# Patient Record
Sex: FEMALE | Race: WHITE | Employment: UNEMPLOYED | URBAN - NONMETROPOLITAN AREA
[De-identification: names, ages, dates, MRNs, and addresses within clinical notes are randomized per-mention and may not be internally consistent; named-entity substitution may affect disease eponyms.]

---

## 2017-10-02 ENCOUNTER — HOSPITAL ENCOUNTER (EMERGENCY)
Age: 32
Discharge: LEFT BEFORE DISCHARGE | DRG: 951 | End: 2017-10-02
Payer: MEDICAID

## 2017-10-02 ENCOUNTER — HOSPITAL ENCOUNTER (OUTPATIENT)
Age: 32
Discharge: STILL ADMITTED OR ADMINISTRATIVE | DRG: 951 | End: 2017-10-03
Attending: OBSTETRICS & GYNECOLOGY | Admitting: OBSTETRICS & GYNECOLOGY
Payer: MEDICAID

## 2017-10-02 VITALS
RESPIRATION RATE: 18 BRPM | HEIGHT: 66 IN | BODY MASS INDEX: 25.71 KG/M2 | HEART RATE: 82 BPM | OXYGEN SATURATION: 100 % | SYSTOLIC BLOOD PRESSURE: 120 MMHG | WEIGHT: 160 LBS | TEMPERATURE: 98 F | DIASTOLIC BLOOD PRESSURE: 74 MMHG

## 2017-10-02 PROCEDURE — 87186 SC STD MICRODIL/AGAR DIL: CPT

## 2017-10-02 PROCEDURE — 87184 SC STD DISK METHOD PER PLATE: CPT

## 2017-10-02 PROCEDURE — 87077 CULTURE AEROBIC IDENTIFY: CPT

## 2017-10-02 PROCEDURE — 80307 DRUG TEST PRSMV CHEM ANLYZR: CPT

## 2017-10-02 PROCEDURE — 81001 URINALYSIS AUTO W/SCOPE: CPT

## 2017-10-02 PROCEDURE — 87086 URINE CULTURE/COLONY COUNT: CPT

## 2017-10-02 ASSESSMENT — PAIN DESCRIPTION - FREQUENCY: FREQUENCY: CONTINUOUS

## 2017-10-02 ASSESSMENT — PAIN DESCRIPTION - PAIN TYPE: TYPE: ACUTE PAIN

## 2017-10-02 ASSESSMENT — PAIN SCALES - GENERAL: PAINLEVEL_OUTOF10: 10

## 2017-10-03 ENCOUNTER — APPOINTMENT (OUTPATIENT)
Dept: ULTRASOUND IMAGING | Age: 32
DRG: 951 | End: 2017-10-03
Payer: MEDICAID

## 2017-10-03 ENCOUNTER — HOSPITAL ENCOUNTER (INPATIENT)
Age: 32
LOS: 2 days | Discharge: LEFT BEFORE DISCHARGE | DRG: 951 | End: 2017-10-05
Attending: INTERNAL MEDICINE | Admitting: INTERNAL MEDICINE
Payer: MEDICAID

## 2017-10-03 ENCOUNTER — HOSPITAL ENCOUNTER (EMERGENCY)
Age: 32
Discharge: LEFT BEFORE DISCHARGE | DRG: 951 | End: 2017-10-03
Payer: MEDICAID

## 2017-10-03 VITALS
RESPIRATION RATE: 16 BRPM | TEMPERATURE: 98.8 F | HEART RATE: 95 BPM | SYSTOLIC BLOOD PRESSURE: 115 MMHG | DIASTOLIC BLOOD PRESSURE: 73 MMHG | OXYGEN SATURATION: 99 %

## 2017-10-03 VITALS
RESPIRATION RATE: 22 BRPM | DIASTOLIC BLOOD PRESSURE: 90 MMHG | OXYGEN SATURATION: 95 % | HEART RATE: 119 BPM | SYSTOLIC BLOOD PRESSURE: 116 MMHG | TEMPERATURE: 98.1 F

## 2017-10-03 DIAGNOSIS — L02.11 CUTANEOUS ABSCESS OF NECK: Primary | ICD-10-CM

## 2017-10-03 DIAGNOSIS — F11.93 NARCOTIC WITHDRAWAL (HCC): ICD-10-CM

## 2017-10-03 DIAGNOSIS — Z34.92 PREGNANT AND NOT YET DELIVERED IN SECOND TRIMESTER: Chronic | ICD-10-CM

## 2017-10-03 PROBLEM — M54.2 NECK PAIN ON LEFT SIDE: Status: ACTIVE | Noted: 2017-10-03

## 2017-10-03 LAB
ALBUMIN SERPL-MCNC: 3.6 G/DL (ref 3.5–5.1)
ALLEN TEST: POSITIVE
ALP BLD-CCNC: 107 U/L (ref 38–126)
ALT SERPL-CCNC: 13 U/L (ref 11–66)
AMPHETAMINE+METHAMPHETAMINE URINE SCREEN: NEGATIVE
ANION GAP SERPL CALCULATED.3IONS-SCNC: 9 MEQ/L (ref 8–16)
ANISOCYTOSIS: ABNORMAL
AST SERPL-CCNC: 15 U/L (ref 5–40)
BACTERIA: ABNORMAL /HPF
BARBITURATE QUANTITATIVE URINE: NEGATIVE
BASE EXCESS (CALCULATED): 1 MMOL/L (ref -2.5–2.5)
BASOPHILS # BLD: 0.3 %
BASOPHILS # BLD: 0.3 %
BASOPHILS ABSOLUTE: 0 THOU/MM3 (ref 0–0.1)
BASOPHILS ABSOLUTE: 0 THOU/MM3 (ref 0–0.1)
BENZODIAZEPINE QUANTITATIVE URINE: NEGATIVE
BILIRUB SERPL-MCNC: 0.5 MG/DL (ref 0.3–1.2)
BILIRUBIN DIRECT: < 0.2 MG/DL (ref 0–0.3)
BILIRUBIN URINE: ABNORMAL
BLOOD, URINE: NEGATIVE
BUN BLDV-MCNC: 6 MG/DL (ref 7–22)
CALCIUM SERPL-MCNC: 8.8 MG/DL (ref 8.5–10.5)
CANNABINOID QUANTITATIVE URINE: POSITIVE
CASTS UA: ABNORMAL /LPF
CHARACTER, URINE: ABNORMAL
CHLORIDE BLD-SCNC: 102 MEQ/L (ref 98–111)
CO2: 26 MEQ/L (ref 23–33)
COCAINE METABOLITE QUANTITATIVE URINE: POSITIVE
COLLECTED BY:: ABNORMAL
COLOR: ABNORMAL
CREAT SERPL-MCNC: 0.3 MG/DL (ref 0.4–1.2)
CRYSTALS, UA: ABNORMAL
DEVICE: ABNORMAL
EOSINOPHIL # BLD: 2.7 %
EOSINOPHIL # BLD: 3.4 %
EOSINOPHILS ABSOLUTE: 0.3 THOU/MM3 (ref 0–0.4)
EOSINOPHILS ABSOLUTE: 0.4 THOU/MM3 (ref 0–0.4)
EPITHELIAL CELLS, UA: ABNORMAL /HPF
ETHYL ALCOHOL, SERUM: < 0.01 %
GFR SERPL CREATININE-BSD FRML MDRD: > 90 ML/MIN/1.73M2
GLUCOSE BLD-MCNC: 97 MG/DL (ref 70–108)
GLUCOSE URINE: NEGATIVE MG/DL
HCO3: 24 MMOL/L (ref 23–28)
HCT VFR BLD CALC: 32.5 % (ref 37–47)
HCT VFR BLD CALC: 33.7 % (ref 37–47)
HEMOGLOBIN: 11.4 GM/DL (ref 12–16)
HEMOGLOBIN: 12.1 GM/DL (ref 12–16)
ICTOTEST: NEGATIVE
KETONES, URINE: NEGATIVE
LACTIC ACID: 1.2 MMOL/L (ref 0.5–2.2)
LEUKOCYTE ESTERASE, URINE: ABNORMAL
LYMPHOCYTES # BLD: 15.7 %
LYMPHOCYTES # BLD: 19.1 %
LYMPHOCYTES ABSOLUTE: 1.8 THOU/MM3 (ref 1–4.8)
LYMPHOCYTES ABSOLUTE: 2.4 THOU/MM3 (ref 1–4.8)
MCH RBC QN AUTO: 30.8 PG (ref 27–31)
MCH RBC QN AUTO: 31.5 PG (ref 27–31)
MCHC RBC AUTO-ENTMCNC: 34.9 GM/DL (ref 33–37)
MCHC RBC AUTO-ENTMCNC: 36 GM/DL (ref 33–37)
MCV RBC AUTO: 87.4 FL (ref 81–99)
MCV RBC AUTO: 88.2 FL (ref 81–99)
MISCELLANEOUS LAB TEST RESULT: ABNORMAL
MONOCYTES # BLD: 3.7 %
MONOCYTES # BLD: 4 %
MONOCYTES ABSOLUTE: 0.4 THOU/MM3 (ref 0.4–1.3)
MONOCYTES ABSOLUTE: 0.5 THOU/MM3 (ref 0.4–1.3)
NITRITE, URINE: NEGATIVE
NUCLEATED RED BLOOD CELLS: 0 /100 WBC
NUCLEATED RED BLOOD CELLS: 0 /100 WBC
O2 SATURATION: 97 %
OPIATES, URINE: POSITIVE
OSMOLALITY CALCULATION: 271.4 MOSMOL/KG (ref 275–300)
OXYCODONE: NEGATIVE
PCO2: 31 MMHG (ref 35–45)
PDW BLD-RTO: 14.3 % (ref 11.5–14.5)
PDW BLD-RTO: 14.6 % (ref 11.5–14.5)
PH BLOOD GAS: 7.49 (ref 7.35–7.45)
PH UA: 6
PHENCYCLIDINE QUANTITATIVE URINE: NEGATIVE
PLATELET # BLD: 181 THOU/MM3 (ref 130–400)
PLATELET # BLD: 194 THOU/MM3 (ref 130–400)
PMV BLD AUTO: 8 MCM (ref 7.4–10.4)
PMV BLD AUTO: 8.2 MCM (ref 7.4–10.4)
PO2: 85 MMHG (ref 71–104)
POTASSIUM SERPL-SCNC: 3.6 MEQ/L (ref 3.5–5.2)
PROTEIN UA: ABNORMAL
RBC # BLD: 3.68 MILL/MM3 (ref 4.2–5.4)
RBC # BLD: 3.86 MILL/MM3 (ref 4.2–5.4)
RBC # BLD: NORMAL 10*6/UL
RBC # BLD: NORMAL 10*6/UL
RBC URINE: ABNORMAL /HPF
RENAL EPITHELIAL, UA: ABNORMAL
SEG NEUTROPHILS: 73.2 %
SEG NEUTROPHILS: 77.6 %
SEGMENTED NEUTROPHILS ABSOLUTE COUNT: 8.7 THOU/MM3 (ref 1.8–7.7)
SEGMENTED NEUTROPHILS ABSOLUTE COUNT: 9.1 THOU/MM3 (ref 1.8–7.7)
SODIUM BLD-SCNC: 137 MEQ/L (ref 135–145)
SOURCE, BLOOD GAS: ABNORMAL
SPECIFIC GRAVITY, URINE: 1.03 (ref 1–1.03)
TOTAL PROTEIN: 6.2 G/DL (ref 6.1–8)
UROBILINOGEN, URINE: 2 EU/DL
WBC # BLD: 11.2 THOU/MM3 (ref 4.8–10.8)
WBC # BLD: 12.4 THOU/MM3 (ref 4.8–10.8)
WBC UA: > 100 /HPF
YEAST: ABNORMAL

## 2017-10-03 PROCEDURE — 87075 CULTR BACTERIA EXCEPT BLOOD: CPT

## 2017-10-03 PROCEDURE — 80053 COMPREHEN METABOLIC PANEL: CPT

## 2017-10-03 PROCEDURE — 85025 COMPLETE CBC W/AUTO DIFF WBC: CPT

## 2017-10-03 PROCEDURE — 99999 PR OFFICE/OUTPT VISIT,PROCEDURE ONLY: CPT | Performed by: INTERNAL MEDICINE

## 2017-10-03 PROCEDURE — 82803 BLOOD GASES ANY COMBINATION: CPT

## 2017-10-03 PROCEDURE — 76942 ECHO GUIDE FOR BIOPSY: CPT

## 2017-10-03 PROCEDURE — 87205 SMEAR GRAM STAIN: CPT

## 2017-10-03 PROCEDURE — 0H94XZZ DRAINAGE OF NECK SKIN, EXTERNAL APPROACH: ICD-10-PCS | Performed by: RADIOLOGY

## 2017-10-03 PROCEDURE — 2500000003 HC RX 250 WO HCPCS: Performed by: INTERNAL MEDICINE

## 2017-10-03 PROCEDURE — G0480 DRUG TEST DEF 1-7 CLASSES: HCPCS

## 2017-10-03 PROCEDURE — 87040 BLOOD CULTURE FOR BACTERIA: CPT

## 2017-10-03 PROCEDURE — 96375 TX/PRO/DX INJ NEW DRUG ADDON: CPT

## 2017-10-03 PROCEDURE — 83605 ASSAY OF LACTIC ACID: CPT

## 2017-10-03 PROCEDURE — 76815 OB US LIMITED FETUS(S): CPT

## 2017-10-03 PROCEDURE — 76536 US EXAM OF HEAD AND NECK: CPT

## 2017-10-03 PROCEDURE — 87184 SC STD DISK METHOD PER PLATE: CPT

## 2017-10-03 PROCEDURE — 2580000003 HC RX 258: Performed by: INTERNAL MEDICINE

## 2017-10-03 PROCEDURE — 2580000003 HC RX 258: Performed by: NURSE PRACTITIONER

## 2017-10-03 PROCEDURE — 87186 SC STD MICRODIL/AGAR DIL: CPT

## 2017-10-03 PROCEDURE — 2500000003 HC RX 250 WO HCPCS: Performed by: NURSE PRACTITIONER

## 2017-10-03 PROCEDURE — 36600 WITHDRAWAL OF ARTERIAL BLOOD: CPT

## 2017-10-03 PROCEDURE — 87070 CULTURE OTHR SPECIMN AEROBIC: CPT

## 2017-10-03 PROCEDURE — 6360000002 HC RX W HCPCS: Performed by: NURSE PRACTITIONER

## 2017-10-03 PROCEDURE — 36415 COLL VENOUS BLD VENIPUNCTURE: CPT

## 2017-10-03 PROCEDURE — 96365 THER/PROPH/DIAG IV INF INIT: CPT

## 2017-10-03 PROCEDURE — 6360000002 HC RX W HCPCS: Performed by: INTERNAL MEDICINE

## 2017-10-03 PROCEDURE — 99285 EMERGENCY DEPT VISIT HI MDM: CPT

## 2017-10-03 PROCEDURE — 87077 CULTURE AEROBIC IDENTIFY: CPT

## 2017-10-03 PROCEDURE — 1200000003 HC TELEMETRY R&B

## 2017-10-03 PROCEDURE — 96361 HYDRATE IV INFUSION ADD-ON: CPT

## 2017-10-03 PROCEDURE — 82248 BILIRUBIN DIRECT: CPT

## 2017-10-03 RX ORDER — ACETAMINOPHEN 500 MG
1000 TABLET ORAL ONCE
Status: DISCONTINUED | OUTPATIENT
Start: 2017-10-03 | End: 2017-10-03 | Stop reason: HOSPADM

## 2017-10-03 RX ORDER — ACETAMINOPHEN 325 MG/1
650 TABLET ORAL EVERY 4 HOURS PRN
Status: DISCONTINUED | OUTPATIENT
Start: 2017-10-03 | End: 2017-10-03 | Stop reason: SDUPTHER

## 2017-10-03 RX ORDER — CLINDAMYCIN PHOSPHATE 600 MG/50ML
600 INJECTION INTRAVENOUS EVERY 8 HOURS
Status: DISCONTINUED | OUTPATIENT
Start: 2017-10-03 | End: 2017-10-05 | Stop reason: HOSPADM

## 2017-10-03 RX ORDER — CLINDAMYCIN PHOSPHATE 600 MG/50ML
600 INJECTION INTRAVENOUS ONCE
Status: COMPLETED | OUTPATIENT
Start: 2017-10-03 | End: 2017-10-03

## 2017-10-03 RX ORDER — ACETAMINOPHEN 325 MG/1
650 TABLET ORAL EVERY 4 HOURS PRN
Status: DISCONTINUED | OUTPATIENT
Start: 2017-10-03 | End: 2017-10-05 | Stop reason: HOSPADM

## 2017-10-03 RX ORDER — METOCLOPRAMIDE HYDROCHLORIDE 5 MG/ML
10 INJECTION INTRAMUSCULAR; INTRAVENOUS ONCE
Status: COMPLETED | OUTPATIENT
Start: 2017-10-03 | End: 2017-10-03

## 2017-10-03 RX ORDER — 0.9 % SODIUM CHLORIDE 0.9 %
1000 INTRAVENOUS SOLUTION INTRAVENOUS ONCE
Status: COMPLETED | OUTPATIENT
Start: 2017-10-03 | End: 2017-10-03

## 2017-10-03 RX ORDER — SODIUM CHLORIDE 9 MG/ML
INJECTION, SOLUTION INTRAVENOUS CONTINUOUS
Status: DISCONTINUED | OUTPATIENT
Start: 2017-10-03 | End: 2017-10-05 | Stop reason: HOSPADM

## 2017-10-03 RX ORDER — SODIUM CHLORIDE 0.9 % (FLUSH) 0.9 %
10 SYRINGE (ML) INJECTION PRN
Status: DISCONTINUED | OUTPATIENT
Start: 2017-10-03 | End: 2017-10-05 | Stop reason: HOSPADM

## 2017-10-03 RX ORDER — MAGNESIUM HYDROXIDE/ALUMINUM HYDROXICE/SIMETHICONE 120; 1200; 1200 MG/30ML; MG/30ML; MG/30ML
30 SUSPENSION ORAL 2 TIMES DAILY PRN
Status: DISCONTINUED | OUTPATIENT
Start: 2017-10-03 | End: 2017-10-05 | Stop reason: HOSPADM

## 2017-10-03 RX ORDER — M-VIT,TX,IRON,MINS/CALC/FOLIC 27MG-0.4MG
1 TABLET ORAL DAILY
Status: DISCONTINUED | OUTPATIENT
Start: 2017-10-03 | End: 2017-10-05 | Stop reason: HOSPADM

## 2017-10-03 RX ORDER — ONDANSETRON 2 MG/ML
4 INJECTION INTRAMUSCULAR; INTRAVENOUS EVERY 6 HOURS PRN
Status: DISCONTINUED | OUTPATIENT
Start: 2017-10-03 | End: 2017-10-05 | Stop reason: HOSPADM

## 2017-10-03 RX ORDER — SODIUM CHLORIDE 0.9 % (FLUSH) 0.9 %
10 SYRINGE (ML) INJECTION EVERY 12 HOURS SCHEDULED
Status: DISCONTINUED | OUTPATIENT
Start: 2017-10-03 | End: 2017-10-05 | Stop reason: HOSPADM

## 2017-10-03 RX ADMIN — CLINDAMYCIN PHOSPHATE 600 MG: 12 INJECTION, SOLUTION INTRAMUSCULAR; INTRAVENOUS at 21:44

## 2017-10-03 RX ADMIN — SODIUM CHLORIDE 1000 ML: 9 INJECTION, SOLUTION INTRAVENOUS at 10:23

## 2017-10-03 RX ADMIN — CEFTRIAXONE 2 G: 2 INJECTION, POWDER, FOR SOLUTION INTRAMUSCULAR; INTRAVENOUS at 21:46

## 2017-10-03 RX ADMIN — Medication 10 ML: at 16:22

## 2017-10-03 RX ADMIN — METOCLOPRAMIDE 10 MG: 5 INJECTION, SOLUTION INTRAMUSCULAR; INTRAVENOUS at 10:48

## 2017-10-03 RX ADMIN — SODIUM CHLORIDE: 9 INJECTION, SOLUTION INTRAVENOUS at 16:22

## 2017-10-03 RX ADMIN — CLINDAMYCIN PHOSPHATE 600 MG: 12 INJECTION, SOLUTION INTRAMUSCULAR; INTRAVENOUS at 12:24

## 2017-10-03 ASSESSMENT — PAIN DESCRIPTION - PROGRESSION: CLINICAL_PROGRESSION: GRADUALLY WORSENING

## 2017-10-03 ASSESSMENT — PAIN DESCRIPTION - DESCRIPTORS
DESCRIPTORS: SORE
DESCRIPTORS: BURNING;TIGHTNESS;CONSTANT

## 2017-10-03 ASSESSMENT — ENCOUNTER SYMPTOMS
ABDOMINAL PAIN: 0
ABDOMINAL DISTENTION: 0
SORE THROAT: 0
VOMITING: 0
CHEST TIGHTNESS: 0
SHORTNESS OF BREATH: 0
COUGH: 0
WHEEZING: 0
NAUSEA: 1
EYE REDNESS: 0
PHOTOPHOBIA: 0
SINUS PRESSURE: 0
DIARRHEA: 0
VOICE CHANGE: 0
BACK PAIN: 0
CONSTIPATION: 0
RHINORRHEA: 0
BLOOD IN STOOL: 0
COLOR CHANGE: 0

## 2017-10-03 ASSESSMENT — PAIN DESCRIPTION - ORIENTATION
ORIENTATION: LEFT
ORIENTATION: LEFT;ANTERIOR

## 2017-10-03 ASSESSMENT — PAIN DESCRIPTION - PAIN TYPE
TYPE: ACUTE PAIN
TYPE: ACUTE PAIN

## 2017-10-03 ASSESSMENT — PAIN DESCRIPTION - LOCATION
LOCATION: NECK
LOCATION: NECK

## 2017-10-03 ASSESSMENT — PAIN DESCRIPTION - FREQUENCY: FREQUENCY: CONTINUOUS

## 2017-10-03 ASSESSMENT — PAIN DESCRIPTION - ONSET: ONSET: ON-GOING

## 2017-10-03 ASSESSMENT — PAIN SCALES - GENERAL
PAINLEVEL_OUTOF10: 10
PAINLEVEL_OUTOF10: 3

## 2017-10-03 NOTE — FLOWSHEET NOTE
Due to pt's increasing pain and difficulty breathing and swallowing, pt was quickly rushed to ED rm 23 via Dr Meyer's orders. Unable to complete pt's admission/triage here in L&D and discharge order placed. MD would still like a complete OB ultrasound completed down there and results called to her STAT so we can proceed with her pregnancy care and course of treatment from here. Pt ok with the plan and states she just wants her neck taken care of now.

## 2017-10-03 NOTE — FLOWSHEET NOTE
Dr Fartun Golden updated on pt's status since arrival and pt's story reviewed. Doppler fht's noted in the 90's. Pt continues to cry and move around in bed because of her neck pain. Pt holding cold washcloth at site. Abdomen soft. Vitals stable, afebrile. Pt has no prenatal care. States she had scant care with her pregnancies but last saw Dr Yash Flores and was planning on continuing with her this pregnancy. Orders received.

## 2017-10-03 NOTE — ED TRIAGE NOTES
Arrived to  ER for the evaluation of abscess to left side neck from \"shooting up\", Fever, wants detox, and pregnant. Was seen yesterday at [facility] and did not appreciate the way was treated by her nurse so eloped. Then went to [facility] and was still treated like \"a junkie\". So left [facility] (where she was told that surgery needed) and now has returned to [facility] ER. Ultrasound of neck was done at [facility]. Knows that this hospital offers detox. Last used IV fentanyl on 10/2 at 2200. VSS. Patient is unsure how far along she is in pregnancy. Can feel baby move and had fetal heart tones yesterday. Patient has track marks to breasts. Afebrile at this time and denies taking any fever reducer PTA. Mom at bedside. Cristel Schwartz, NP at bedside. New orders pending.

## 2017-10-03 NOTE — ED TRIAGE NOTES
Pt to ED room 23 from L&D. Katherine Arreola reported pt was seen on L&D and was cleared. Per Dr. Ursula Carbajal, OB would like pt to have lab work, UA and U/S completed. Pt was complaining of left neck pain from an abscess. Pt stated uses heroin, cocaine, and whatever else she can get to and the neck is where she injects. Vital signs were completed. 5C called and instructed to discharge pt from unit in order for pt's chart to be accessed. Sunshine Rousseau RN verbalized understanding. Pt updated on POC.

## 2017-10-03 NOTE — FLOWSHEET NOTE
Pt arrives via wheelchair from ED with c/o abdominal pain, fever, chills, nausea from withdrawling, and neck pain from her jaw to her shoulder. Large abscess noted on the left side of her neck from heroin use. Pt states she had a miscarriage and D&C in December of last year and is guessing her last LMP was June or July. Pt states she is at the end of her rope, begging for help, and if we don't help her the next step is death. Pt's mother at bedside, supportive. Pt easily admits she's been a user for 10 years now and uses anything she can get a hold of including crack cocaine. Pt states her drug screens have been negative for heroin and she's been using straight fentanyl for the last year. Pt also concerned about having trich. Pt states her sister currently has trich and they are prostitutes with the same men. Denies any vag bleeding or leaking of fluid. Reports +fm. Doppler fht's noted in the 90's with pulse ox in place to differentiate (at 2331).

## 2017-10-03 NOTE — ED PROVIDER NOTES
[facility] Emergency Department    CHIEF COMPLAINT       Chief Complaint   Patient presents with    Abscess     Left Neck     Fever    Addiction Problem       Nurses Notes reviewed and I agree except as noted in the HPI. HISTORY OF PRESENT ILLNESS    Prieto Richardson is a 27 y.o. female who presents to the ED for evaluation of an abscess on her neck. Patient states that she has been an IV heroin user for 10 years, and that she has been trying to gain access through her neck recently. She states that she noticed the abscess forming roughly 1 week ago, but that the abscess has since grown larger, more painful, and gotten more erythematous. Patient states that she came to the ED for treatment on 10/2/17, but that she was not receiving the care that she wanted, so she eloped. She states that she then went to [facility] ED for treatment this morning where they did an ultrasound of the abscess and informed her that she would need surgery. Patient states that she was treated like \"a junkie\" at [facility], so she left and then proceeded to come here. She states that she last used IV drugs yesterday, and that she did not use in her neck. She rates her pain at a 10/10 in severity, and states that the pain feels burning and tight in quality. She states that the pain is continuous in duration, and she denies any modifying factors. Patient reports that she is currently going through withdraw and is experiencing a fever, nausea, tremors, dizziness, and visual disturbances. She also states that she is pregnant, but that she does not know how far along she is. She states that she has irregular menstrual cycles, and that she believes that her last one was in June, but states that she can feel her baby moving. She denies ever receiving an ultrasound for her pregnancy. Patient denies further complaints at initial encounter.       Pain description:  Onset: 1 week ago  Location: Left-sided neck  Duration: Continuous  Character: Burning and tight  Aggravating factors: None  Radiation: None  Severity: 10/10    Experienced previously: No    HPI was provided by the patient. REVIEW OF SYSTEMS     Review of Systems   Constitutional: Positive for fever. Negative for appetite change, chills, diaphoresis, fatigue and unexpected weight change. HENT: Negative for congestion, hearing loss, postnasal drip, rhinorrhea, sinus pressure, sore throat and voice change. Eyes: Positive for visual disturbance. Negative for photophobia and redness. Respiratory: Negative for cough, chest tightness, shortness of breath and wheezing. Cardiovascular: Negative for chest pain and palpitations. Gastrointestinal: Positive for nausea. Negative for abdominal distention, abdominal pain, blood in stool, constipation, diarrhea and vomiting. Endocrine: Negative for cold intolerance, heat intolerance, polydipsia, polyphagia and polyuria. Genitourinary: Negative for difficulty urinating, dysuria, flank pain, frequency and vaginal pain. Musculoskeletal: Positive for neck pain (from abscess). Negative for arthralgias, back pain, gait problem, joint swelling and neck stiffness. Skin: Positive for wound (left neck). Negative for color change and rash. Allergic/Immunologic: Negative for immunocompromised state. Neurological: Positive for dizziness and tremors. Negative for weakness, light-headedness, numbness and headaches. Hematological: Does not bruise/bleed easily. Psychiatric/Behavioral: Negative for behavioral problems, confusion, decreased concentration, hallucinations, self-injury and suicidal ideas. The patient is not nervous/anxious. PAST MEDICAL HISTORY    has a past medical history of Anemia; Anxiety and depression; Bipolar 1 disorder ([address] Constipation;  Disc herniation; Gestational diabetes; Hepatitis C; Opioid abuse; Pap smear, as part of routine gynecological examination; Postpartum depression; Sciatic pain; Scoliosis; TMJ (temporomandibular joint disorder); and Trauma. SURGICAL HISTORY      has a past surgical history that includes Mouth surgery; Dilation and curettage of uterus (12/2016); and fracture surgery. CURRENT MEDICATIONS       There are no discharge medications for this patient. ALLERGIES     is allergic to ketorolac tromethamine; naproxen; nsaids; nubain [nalbuphine hcl]; and pcn [penicillins]. FAMILY HISTORY     indicated that her mother is alive. She indicated that her father is alive. She indicated that the status of her maternal grandmother is unknown.  family history includes Anxiety Disorder in her mother; Cancer in her maternal grandmother; Depression in her mother; Diabetes in her maternal grandmother and mother; High Blood Pressure in her mother; High Cholesterol in her mother; Other in her father; Substance Abuse in her father; Thyroid Disease in her mother. SOCIAL HISTORY      reports that she has been smoking Cigarettes. She has a 60.00 pack-year smoking history. She has never used smokeless tobacco. She reports that she uses illicit drugs, including Marijuana, IV, Cocaine, and Opiates . She reports that she does not drink alcohol. PHYSICAL EXAM     INITIAL VITALS:  height is 5' 7\" (1.702 m) and weight is 160 lb (72.6 kg). Her oral temperature is 99.3 °F (37.4 °C). Her blood pressure is 117/80 and her pulse is 86. Her respiration is 18 and oxygen saturation is 100%. Physical Exam   Constitutional: She is oriented to person, place, and time. Vital signs are normal. She appears well-developed and well-nourished. She is active and cooperative. HENT:   Head: Normocephalic. Right Ear: External ear normal.   Left Ear: External ear normal.   Mouth/Throat: Uvula is midline and oropharynx is clear and moist.   Eyes: Conjunctivae and EOM are normal. Pupils are equal, round, and reactive to light. Neck: Normal range of motion. Neck supple. Normal range of motion present. Cardiovascular: Normal rate, S1 normal, S2 normal, normal heart sounds, intact distal pulses and normal pulses. Pulmonary/Chest: Effort normal and breath sounds normal. No respiratory distress. She has no decreased breath sounds. She has no wheezes. She has no rhonchi. She has no rales. She exhibits no tenderness. Abdominal: Soft. Normal appearance and bowel sounds are normal. She exhibits no distension. There is no tenderness. Musculoskeletal: Normal range of motion. Left shoulder: She exhibits tenderness (Supraclavicular). Lymphadenopathy:     She has no cervical adenopathy. Neurological: She is alert and oriented to person, place, and time. No sensory deficit. GCS eye subscore is 4. GCS verbal subscore is 5. GCS motor subscore is 6. Skin: Skin is warm, dry and intact. There is erythema. Left-sided supraclavicular 6cm area of induration and fluctuance with surrounding erythema. Multiple stick marks noted over the patient's body. Psychiatric: She has a normal mood and affect. Her speech is normal and behavior is normal. Thought content normal.   Nursing note and vitals reviewed. DIFFERENTIAL DIAGNOSIS:   Sepsis, abscess, endocarditis    DIAGNOSTIC RESULTS     EKG: All EKG's are interpreted by the Emergency Department Physician who either signs or Co-signs this chart in the absence of a cardiologist.    None    RADIOLOGY: non-plain film images(s) such as CT, Ultrasound and MRI are read by the radiologist.  Plain radiographic images are visualized and preliminarily interpreted by the emergency physician unless otherwise stated below. US DRAIN SOFT TISSUE ABSCESS   Final Result   1. Uncomplicated ultrasound guided aspiration of a 2.1 x 1.7 x 1.6 cm abscess within the subcutaneous tissues of the left neck. 2. A total of 3 mL of purulent aspirate was obtained with complete collapse of the abscess cavity. There is however residual nondrainable phlegmon.                **This report has been created using voice recognition software. It may contain minor errors which are inherent in voice recognition technology. **      Final report electronically signed by Dr. Jacinta Boss on 10/3/2017 3:04 PM      US OB 1 OR MORE FETUS LIMITED   Final Result   Single live intrauterine fetus with average ultrasound age based on measurements of 15 weeks 2 days and estimated date of delivery of 3/25/2018. **This report has been created using voice recognition software. It may contain minor errors which are inherent in voice recognition technology. **      Final report electronically signed by Dr. Yvan Ruiz on 10/3/2017 12:37 PM      US HEAD NECK SOFT TISSUE THYROID   Final Result   Probable abscess in the soft tissues of the left neck as described. **This report has been created using voice recognition software. It may contain minor errors which are inherent in voice recognition technology. **      Final report electronically signed by Dr. Yvan Ruiz on 10/3/2017 12:21 PM            LABS:   Labs Reviewed   CBC WITH AUTO DIFFERENTIAL - Abnormal; Notable for the following:        Result Value    WBC 11.2 (*)     RBC 3.68 (*)     Hemoglobin 11.4 (*)     Hematocrit 32.5 (*)     RDW 14.6 (*)     Segs Absolute 8.7 (*)     All other components within normal limits   BASIC METABOLIC PANEL - Abnormal; Notable for the following:     BUN 6 (*)     CREATININE 0.3 (*)     All other components within normal limits   OSMOLALITY - Abnormal; Notable for the following:     Osmolality Calc 271.4 (*)     All other components within normal limits   CULTURE BLOOD #1   CULTURE BLOOD #2   ANAEROBIC AND AEROBIC CULTURE   HEPATIC FUNCTION PANEL   LACTIC ACID, PLASMA   ETHANOL   ANION GAP   GLOMERULAR FILTRATION RATE, ESTIMATED   BLOOD GAS, ARTERIAL       EMERGENCY DEPARTMENT COURSE:   Vitals:    Vitals:    10/03/17 1216 10/03/17 1235 10/03/17 1332 10/03/17 1431   BP: (!) 100/55  108/85 117/80   Pulse: 78  93 ondansetron (ZOFRAN) injection 4 mg (not administered)   clindamycin (CLEOCIN) 600 mg in dextrose 5 % 50 mL IVPB (not administered)   0.9 % sodium chloride bolus (0 mLs Intravenous Stopped 10/3/17 1326)   metoclopramide (REGLAN) injection 10 mg (10 mg Intravenous Given 10/3/17 1048)   clindamycin (CLEOCIN) 600 mg in dextrose 5 % 50 mL IVPB (0 mg Intravenous Stopped 10/3/17 1254)       Patient was seen independently by myself. The patient's final impression and disposition and plan was determined by myself. CRITICAL CARE:   None    CONSULTS:  Dr. Isamar Cherry (Surgery)    PROCEDURES:  None    FINAL IMPRESSION      1. Cutaneous abscess of neck    2. Pregnant and not yet delivered in second trimester    3. Narcotic withdrawal [facility])          DISPOSITION/PLAN   Patient admitted to the hospitalist service    PATIENT REFERRED TO:  [address] [place] [address]. [address] [place] [id number] [phone]    As needed      DISCHARGE MEDICATIONS:  There are no discharge medications for this patient. (Please note that portions of this note were completed with a voice recognition program.  Efforts were made to edit the dictations but occasionally words are mis-transcribed.)    Scribe:  Patrice Jacobs 10/3/17 9:32 AM Scribing for and in the presence of Ceasar Combs CNP. Signed by: Chris Dominguez, 10/03/17 3:30 PM    Provider:  I personally performed the services described in the documentation, reviewed and edited the documentation which was dictated to the scribe in my presence, and it accurately records my words and actions.     Ceasar Combs CNP 10/03/17 3:30 PM    LAUREN Baker NP  10/03/17 2996

## 2017-10-03 NOTE — FLOWSHEET NOTE
Dr Pily Ye informed that pt left the ED very upset without getting any treatment down there including her ultrasound.

## 2017-10-03 NOTE — IP AVS SNAPSHOT
Patient Information     Patient Name POOL Gill 1985      Care Provided at:     Name Address Phone       [address] [phone]            Your Visit    Here you will find information about your visit, including the reason for your visit. Please take this sheet with you when you visit your doctor or other health care provider in the future. It will help determine the best possible medical care for you at that time. If you have any questions once you leave the hospital, please call the department phone number listed below. Why you were here     Your primary diagnosis was:  Neck Pain On Left Side    Your diagnoses also included:  Delirium, Drug-Induced (Hcc), Exposure To Hiv Virus, Narcotic Withdrawal (Hcc), Borderline Personality Disorder, Hepatitis C, Alcoholism (Hcc), Pregnant And Not Yet Delivered In Second Trimester, Cutaneous Abscess Of Neck      Visit Information     Date & Time Provider Department Dept. Phone    10/3/2017 [address]en [address] [facility], [facility] ICU STEPDOWN TELEMETRY 4K [phone]       Follow-up Appointments    Below is a list of your follow-up and future appointments. This may not be a complete list as you may have made appointments directly with providers that we are not aware of or your providers may have made some for you. Please call your providers to confirm appointments. It is important to keep your appointments. Please bring your current insurance card, photo ID, co-pay, and all medication bottles to your appointment. If self-pay, payment is expected at the time of service. Follow-up Information     Follow up with [address] [place] [address]. Why:  As needed    Contact information:    [address] [facility] [address] [phone]        Follow up with Galdino Maldonado NP. Go on 10/16/2017.     Specialty:  Nurse Practitioner ? Review your current list of  medications, immunization, and allergies. ? Review your future test results online . ? Review your discharge instructions provided by your caregivers at discharge    Certain functionality such as prescription refills, scheduling appointments or sending messages to your provider are not activated if your provider does not use [facility] in his/her office    For questions regarding your MyChart account call [phone]. If you have a clinical question, please call your doctor's office. The information on all pages of the After Visit Summary has been reviewed with me, the patient and/or responsible adult, by my health care provider(s). I had the opportunity to ask questions regarding this information. I understand I should dispose of my armband safely at home to protect my health information. A complete copy of the After Visit Summary has been given to me, the patient and/or responsible adult. Patient Signature/Responsible Adult:____________________    Clinician Signature:_____________________    Date:_____________________    Time:_____________________        More Information           Alcohol and Drug Problems: Care Instructions  Your Care Instructions  You can improve your life and health by stopping your use of alcohol or drugs. Ending dependency on alcohol or drugs may help you feel and sleep better. You may get along better with your family, friends, and coworkers. There are medicines and programs that can help. Follow-up care is a key part of your treatment and safety. Be sure to make and go to all appointments, and call your doctor if you are having problems. It's also a good idea to know your test results and keep a list of the medicines you take. How can you care for yourself at home? · If you have been given medicine to help keep you sober or reduce your cravings, be sure to take it as prescribed. Call your doctor now or seek immediate medical care if:  · You have serious withdrawal symptoms such as confusion, hallucinations, or severe trembling. Watch closely for changes in your health, and be sure to contact your doctor if:  · You have a relapse. · You need more help or support to stop. Where can you learn more? Go to https://chpepiceweb.Kybernesis. org and sign in to your Helixbind account. Enter [id number] in the L[employer]ize box to learn more about \"Alcohol and Drug Problems: Care Instructions. \"     If you do not have an account, please click on the \"Sign Up Now\" link. Current as of: November 3, 2016  Content Version: 11.3  © 9290-9868 [address], [employer]. Care instructions adapted under license by [facility] ([facility]). If you have questions about a medical condition or this instruction, always ask your healthcare professional. [place] [address] any warranty or liability for your use of this information. Learning About Taking Opioids During Pregnancy  What are opioids? Opioids are strong pain medicines. Examples include hydrocodone, oxycodone, fentanyl, and morphine. Heroin is an example of an illegal opioid. Your body gets used to this type of drug if you take it all the time. This is called being dependent on the drug. When you stop taking it, you may go through withdrawal.  Withdrawal symptoms may include:  · Nausea. · Sweating and chills. · Diarrhea. · Feeling anxious and shaky. How does using an opioid during pregnancy affect the baby? When you take an opioid, the drug passes through the placenta and enters the baby's bloodstream. It affects the baby in much the same way as it affects you. The baby's body gets used to the drug. After birth, when the drug starts to leave the body, the baby goes through withdrawal. This may happen within hours after birth or later, depending on the drug. This problem is called  abstinence syndrome, or AZALEA. Babies with AZALEA may be cranky and jittery. They may cry a lot and have problems feeding and sleeping. This can be stressful for you and your baby. But most babies recover after the body has rid itself of the drug. The length of time it takes for the body to get rid of the drug depends on the drug and how much is in the body. Opioid misuse during pregnancy may also cause:  · Premature birth. · Stillbirth. · Birth defects. · Low birth weight. How is use of opioids during pregnancy treated? Don't stop taking an opioid on your own. If you stop suddenly, it could cause premature birth or stillbirth. Talk to your doctor first about treatment. Treatment is called medication-assisted treatment, or MAT. During MAT, you take a substitute drugusually methadone or buprenorphinein place of the opioid you were using. You can start MAT while you are pregnant. Besides helping to manage withdrawal symptoms in you and your baby, this treatment also helps to reduce cravings. Treatment may also include group therapy, one or more types of counseling, and drug education. Follow-up care is a key part of your treatment and safety. Be sure to make and go to all appointments, and call your doctor if you are having problems. It's also a good idea to know your test results and keep a list of the medicines you take. Where can you learn more? Go to https://Kids Movie.JFrog. org and sign in to your Depositphotos account. Enter D862 in the [facility] box to learn more about \"Learning About Taking Opioids During Pregnancy. \"     If you do not have an account, please click on the \"Sign Up Now\" link. Current as of: March 16, 2017  Content Version: 11.3  © 7168-2685 [employer]all, [employer]. Care instructions adapted under license by [address]  If you have questions about a medical condition or this instruction, always ask your healthcare professional. [employer] disclaims any warranty or liability for your use of this information. Substance Use and Pregnancy: Care Instructions  Your Care Instructions  When you are pregnant, each thing that you eat, drink, or take into your body may affect your unborn baby. So try to eat healthy foods and drink lots of water while you are pregnant. Do not drink alcohol or use drugs, such as marijuana, meth, cocaine, or heroin. Even a little alcohol or drug use can hurt your baby. Smoking can also slow your baby's growth. Alcohol and drug use during pregnancy can cause problems in your child that can last for his or her whole life. If you are thinking about getting pregnant, stop drinking alcohol and do not use drugs or smoke. You may not know when you get pregnant. Follow-up care is a key part of your treatment and safety. Be sure to make and go to all appointments, and call your doctor if you are having problems. It's also a good idea to know your test results and keep a list of the medicines you take. How can you care for yourself at home? · Stop drinking alcohol. Tell your doctor if you need help to quit. Counseling, support groups, and sometimes medicines can help you stay sober. If you have a history of problems with quitting, tell your doctor. A person who drinks most of the day, starting in the morning, for example, may need medical help to safely quit drinking. · Do not smoke or allow others to smoke around you. If you need help quitting, talk to your doctor about stop-smoking programs and medicines. These can increase your chances of quitting for good. · Eat a variety of foods to get all the nutrients you need. · Drink lots of water every day. This can help reduce premature contractions. · Take a daily vitamin that has folic acid. A vitamin meant for pregnant women can help prevent birth defects. · Increase the calcium in your diet.  Get 4 or more servings of milk and milk products each day. Good choices include nonfat or low-fat milk, yogurt, and cheese. If you cannot eat milk products, you can get calcium from calcium-fortified products such as orange juice, soy milk, and tofu. Other sources of calcium include leafy green vegetables such as broccoli, kale, mustard greens, turnip greens, bok alysha, and brussels sprouts. · Limit the amount of caffeine you get. Coffee, tea, cola, and chocolate all have caffeine. · Get regular exercise during pregnancy. Try to get 30 minutes on most days of the week. Walking and swimming are good exercises during pregnancy. · Use acetaminophen (Tylenol) to relieve minor problems, such as a mild headache or backache or a mild fever with cold symptoms. Do not use nonsteroidal anti-inflammatory drugs (NSAIDs), such as any type of aspirin product, ibuprofen (Advil, Motrin), or naproxen (Aleve), unless your doctor says it is okay. Do not take any other medicine unless your doctor says it is okay. · Go to all scheduled doctor visits while you are pregnant. When should you call for help? Watch closely for changes in your health, and be sure to contact your doctor if:  · You need help with drug or alcohol problems. Where can you learn more? Go to https://Nativis.healthgauzz. org and sign in to your blogTV account. Enter V415 in the [facility] box to learn more about \"Substance Use and Pregnancy: Care Instructions. \"     If you do not have an account, please click on the \"Sign Up Now\" link. Current as of: March 16, 2017  Content Version: 11.3  © 9117-2372 [employer]. Care instructions adapted under license by [facility] ([facility]). If you have questions about a medical condition or this instruction, always ask your healthcare professional. [address] any warranty or liability for your use of this information.

## 2017-10-04 LAB
ANION GAP SERPL CALCULATED.3IONS-SCNC: 15 MEQ/L (ref 8–16)
ANISOCYTOSIS: ABNORMAL
BASOPHILS # BLD: 0.2 %
BASOPHILS ABSOLUTE: 0 THOU/MM3 (ref 0–0.1)
BUN BLDV-MCNC: 6 MG/DL (ref 7–22)
CALCIUM SERPL-MCNC: 8.7 MG/DL (ref 8.5–10.5)
CHLORIDE BLD-SCNC: 104 MEQ/L (ref 98–111)
CO2: 18 MEQ/L (ref 23–33)
CREAT SERPL-MCNC: 0.3 MG/DL (ref 0.4–1.2)
EOSINOPHIL # BLD: 2.2 %
EOSINOPHILS ABSOLUTE: 0.3 THOU/MM3 (ref 0–0.4)
GFR SERPL CREATININE-BSD FRML MDRD: > 90 ML/MIN/1.73M2
GLUCOSE BLD-MCNC: 95 MG/DL (ref 70–108)
HCG,BETA SUBUNIT,QUAL,SERUM: ABNORMAL MIU/ML (ref 0–5)
HCT VFR BLD CALC: 35 % (ref 37–47)
HEMOGLOBIN: 12.2 GM/DL (ref 12–16)
HEPATITIS B SURFACE ANTIGEN: NEGATIVE
LYMPHOCYTES # BLD: 14.2 %
LYMPHOCYTES ABSOLUTE: 1.6 THOU/MM3 (ref 1–4.8)
MCH RBC QN AUTO: 31.1 PG (ref 27–31)
MCHC RBC AUTO-ENTMCNC: 34.9 GM/DL (ref 33–37)
MCV RBC AUTO: 88.9 FL (ref 81–99)
MONOCYTES # BLD: 4.2 %
MONOCYTES ABSOLUTE: 0.5 THOU/MM3 (ref 0.4–1.3)
NUCLEATED RED BLOOD CELLS: 0 /100 WBC
PDW BLD-RTO: 14.7 % (ref 11.5–14.5)
PLATELET # BLD: 175 THOU/MM3 (ref 130–400)
PMV BLD AUTO: 8.5 MCM (ref 7.4–10.4)
POTASSIUM SERPL-SCNC: 3.8 MEQ/L (ref 3.5–5.2)
RBC # BLD: 3.93 MILL/MM3 (ref 4.2–5.4)
RBC # BLD: NORMAL 10*6/UL
SEG NEUTROPHILS: 79.2 %
SEGMENTED NEUTROPHILS ABSOLUTE COUNT: 9.1 THOU/MM3 (ref 1.8–7.7)
SODIUM BLD-SCNC: 137 MEQ/L (ref 135–145)
TRICHOMONAS PREP: NORMAL
WBC # BLD: 11.5 THOU/MM3 (ref 4.8–10.8)

## 2017-10-04 PROCEDURE — 6360000002 HC RX W HCPCS

## 2017-10-04 PROCEDURE — 6360000002 HC RX W HCPCS: Performed by: PSYCHIATRY & NEUROLOGY

## 2017-10-04 PROCEDURE — 90791 PSYCH DIAGNOSTIC EVALUATION: CPT | Performed by: NURSE PRACTITIONER

## 2017-10-04 PROCEDURE — 36415 COLL VENOUS BLD VENIPUNCTURE: CPT

## 2017-10-04 PROCEDURE — 6370000000 HC RX 637 (ALT 250 FOR IP): Performed by: OBSTETRICS & GYNECOLOGY

## 2017-10-04 PROCEDURE — 84702 CHORIONIC GONADOTROPIN TEST: CPT

## 2017-10-04 PROCEDURE — 85025 COMPLETE CBC W/AUTO DIFF WBC: CPT

## 2017-10-04 PROCEDURE — 6360000002 HC RX W HCPCS: Performed by: HOSPITALIST

## 2017-10-04 PROCEDURE — 80048 BASIC METABOLIC PNL TOTAL CA: CPT

## 2017-10-04 PROCEDURE — 6370000000 HC RX 637 (ALT 250 FOR IP): Performed by: HOSPITALIST

## 2017-10-04 PROCEDURE — 2580000003 HC RX 258: Performed by: INTERNAL MEDICINE

## 2017-10-04 PROCEDURE — 6370000000 HC RX 637 (ALT 250 FOR IP): Performed by: FAMILY MEDICINE

## 2017-10-04 PROCEDURE — 1200000003 HC TELEMETRY R&B

## 2017-10-04 PROCEDURE — 6360000002 HC RX W HCPCS: Performed by: INTERNAL MEDICINE

## 2017-10-04 PROCEDURE — 87340 HEPATITIS B SURFACE AG IA: CPT

## 2017-10-04 PROCEDURE — 2500000003 HC RX 250 WO HCPCS: Performed by: INTERNAL MEDICINE

## 2017-10-04 PROCEDURE — 86592 SYPHILIS TEST NON-TREP QUAL: CPT

## 2017-10-04 PROCEDURE — 87210 SMEAR WET MOUNT SALINE/INK: CPT

## 2017-10-04 PROCEDURE — 87522 HEPATITIS C REVRS TRNSCRPJ: CPT

## 2017-10-04 PROCEDURE — 99254 IP/OBS CNSLTJ NEW/EST MOD 60: CPT | Performed by: SURGERY

## 2017-10-04 RX ORDER — NICOTINE 21 MG/24HR
1 PATCH, TRANSDERMAL 24 HOURS TRANSDERMAL DAILY
Status: DISCONTINUED | OUTPATIENT
Start: 2017-10-04 | End: 2017-10-05 | Stop reason: HOSPADM

## 2017-10-04 RX ORDER — BUSPIRONE HYDROCHLORIDE 10 MG/1
10 TABLET ORAL 3 TIMES DAILY
Status: DISCONTINUED | OUTPATIENT
Start: 2017-10-04 | End: 2017-10-05 | Stop reason: HOSPADM

## 2017-10-04 RX ORDER — METRONIDAZOLE 500 MG/1
2000 TABLET ORAL ONCE
Status: COMPLETED | OUTPATIENT
Start: 2017-10-04 | End: 2017-10-04

## 2017-10-04 RX ORDER — GABAPENTIN 600 MG/1
300 TABLET ORAL 3 TIMES DAILY
Status: DISCONTINUED | OUTPATIENT
Start: 2017-10-04 | End: 2017-10-05 | Stop reason: HOSPADM

## 2017-10-04 RX ORDER — METOCLOPRAMIDE HYDROCHLORIDE 5 MG/ML
10 INJECTION INTRAMUSCULAR; INTRAVENOUS EVERY 6 HOURS
Status: DISCONTINUED | OUTPATIENT
Start: 2017-10-04 | End: 2017-10-05 | Stop reason: HOSPADM

## 2017-10-04 RX ORDER — HYDROMORPHONE HYDROCHLORIDE 2 MG/1
5 TABLET ORAL EVERY 4 HOURS PRN
Status: DISCONTINUED | OUTPATIENT
Start: 2017-10-04 | End: 2017-10-05 | Stop reason: HOSPADM

## 2017-10-04 RX ADMIN — METOCLOPRAMIDE 10 MG: 5 INJECTION, SOLUTION INTRAMUSCULAR; INTRAVENOUS at 10:51

## 2017-10-04 RX ADMIN — CLINDAMYCIN PHOSPHATE 600 MG: 12 INJECTION, SOLUTION INTRAMUSCULAR; INTRAVENOUS at 05:07

## 2017-10-04 RX ADMIN — BUSPIRONE HYDROCHLORIDE 10 MG: 10 TABLET ORAL at 21:21

## 2017-10-04 RX ADMIN — HYDROMORPHONE HYDROCHLORIDE 5 MG: 2 TABLET ORAL at 17:49

## 2017-10-04 RX ADMIN — GABAPENTIN 300 MG: 600 TABLET ORAL at 21:20

## 2017-10-04 RX ADMIN — HYDROMORPHONE HYDROCHLORIDE 1 MG: 1 INJECTION, SOLUTION INTRAMUSCULAR; INTRAVENOUS; SUBCUTANEOUS at 09:43

## 2017-10-04 RX ADMIN — HYDROMORPHONE HYDROCHLORIDE 5 MG: 2 TABLET ORAL at 22:08

## 2017-10-04 RX ADMIN — METOCLOPRAMIDE 10 MG: 5 INJECTION, SOLUTION INTRAMUSCULAR; INTRAVENOUS at 21:20

## 2017-10-04 RX ADMIN — METRONIDAZOLE 2000 MG: 500 TABLET, FILM COATED ORAL at 17:49

## 2017-10-04 RX ADMIN — METOCLOPRAMIDE 10 MG: 5 INJECTION, SOLUTION INTRAMUSCULAR; INTRAVENOUS at 17:01

## 2017-10-04 RX ADMIN — CEFTRIAXONE 2 G: 2 INJECTION, POWDER, FOR SOLUTION INTRAMUSCULAR; INTRAVENOUS at 21:21

## 2017-10-04 RX ADMIN — HYDROMORPHONE HYDROCHLORIDE 1 MG: 1 INJECTION, SOLUTION INTRAMUSCULAR; INTRAVENOUS; SUBCUTANEOUS at 13:55

## 2017-10-04 RX ADMIN — CLINDAMYCIN PHOSPHATE 600 MG: 12 INJECTION, SOLUTION INTRAMUSCULAR; INTRAVENOUS at 21:21

## 2017-10-04 RX ADMIN — CLINDAMYCIN PHOSPHATE 600 MG: 12 INJECTION, SOLUTION INTRAMUSCULAR; INTRAVENOUS at 11:34

## 2017-10-04 RX ADMIN — GABAPENTIN 300 MG: 600 TABLET ORAL at 13:00

## 2017-10-04 ASSESSMENT — PAIN DESCRIPTION - DESCRIPTORS
DESCRIPTORS: ACHING;DISCOMFORT
DESCRIPTORS: ACHING
DESCRIPTORS: ACHING;DISCOMFORT

## 2017-10-04 ASSESSMENT — ENCOUNTER SYMPTOMS
SHORTNESS OF BREATH: 1
VOMITING: 0
COUGH: 0
DIARRHEA: 0
NAUSEA: 1

## 2017-10-04 ASSESSMENT — PAIN DESCRIPTION - LOCATION
LOCATION: ABDOMEN;NECK
LOCATION: ABDOMEN;NECK
LOCATION: ABDOMEN

## 2017-10-04 ASSESSMENT — PAIN DESCRIPTION - ORIENTATION
ORIENTATION: LEFT
ORIENTATION: MID
ORIENTATION: MID;LEFT

## 2017-10-04 ASSESSMENT — PAIN SCALES - GENERAL
PAINLEVEL_OUTOF10: 8
PAINLEVEL_OUTOF10: 5
PAINLEVEL_OUTOF10: 5
PAINLEVEL_OUTOF10: 7

## 2017-10-04 ASSESSMENT — PAIN DESCRIPTION - PAIN TYPE
TYPE: ACUTE PAIN

## 2017-10-04 NOTE — PROGRESS NOTES
Elida Faustin is a 27 y.o. female patient.     Current Facility-Administered Medications   Medication Dose Route Frequency Provider Last Rate Last Dose    HYDROmorphone (DILAUDID) injection 1 mg  1 mg Intravenous Q4H PRN Shell Meadows, DO   1 mg at 10/04/17 3261    influenza quadrivalent split vaccine (FLUZONE;FLUARIX) injection 0.5 mL  0.5 mL Intramuscular Once Mu Meadows DO        metoclopramide (REGLAN) injection 10 mg  10 mg Intravenous Q6H Mu Meadows DO   10 mg at 10/04/17 1051    0.9 % sodium chloride infusion   Intravenous Continuous Ramsey Schmidt MD 30 mL/hr at 10/03/17 1622      sodium chloride flush 0.9 % injection 10 mL  10 mL Intravenous 2 times per day Ramsey Schmidt MD        sodium chloride flush 0.9 % injection 10 mL  10 mL Intravenous PRN Ramsey Schmidt MD   10 mL at 10/03/17 1622    magnesium hydroxide (MILK OF MAGNESIA) 400 MG/5ML suspension 30 mL  30 mL Oral Daily PRN Ramsey Schmidt MD        ondansetron [facility]) injection 4 mg  4 mg Intravenous Q6H PRN Ramsey Schmidt MD        clindamycin (CLEOCIN) 600 mg in dextrose 5 % 50 mL IVPB  600 mg Intravenous Q8H Ramsey Schmidt MD   Stopped at 10/04/17 1206    acetaminophen (TYLENOL) tablet 650 mg  650 mg Oral Q4H PRN Luis Daniel Perez MD        aluminum & magnesium hydroxide-simethicone (MAALOX) 200-200-20 MG/5ML suspension 30 mL  30 mL Oral BID PRN Luis Daniel Perez MD        enoxaparin (LOVENOX) injection 40 mg  40 mg Subcutaneous Q24H Luis Daniel Perez MD        therapeutic multivitamin-minerals 1 tablet  1 tablet Oral Daily Luis Daniel Perez MD        cefTRIAXone (ROCEPHIN) 2 g in sterile water 20 mL IV syringe  2 g Intravenous Q24H Jesica Rivera MD   Stopped at 10/03/17 2150     Allergies   Allergen Reactions    Ketorolac Tromethamine Swelling    Naproxen Swelling    Nsaids Swelling    Nubain [Nalbuphine Hcl]     Pcn [Penicillins] Swelling     Principal Problem:    Neck pain on left side  Active Pulses: Distal pulses are intact. Assessment:    Condition: In unstable condition. Worsening. 1. 2.6 x 2.4 x 1.7 cm Left Neck Abscess in the setting of IVDA with Heroin and Fentanyl. 2. Acute Opiate Withdrawal.    3. Pregnant at ~15 weeks of gestation. 4. Hepatitis C.    5. Borderline Personality disorder. Plan:    1. Continue IV Clindamycin with general surgery consult pending for potential I&D with consult appreciated in advance. 2. Started on Dilaudid 1 mg IV q. 4 hours prn to stop opiate withdrawal with prn IV Reglan at patient request to help prevent maternal distress and potential fetal loss. 3. OB/GYN Dr. Rachelle Kendrick was consulted from ER with input appreciated. 4. LFT's normal.    5. Consider inpatient psychiatric care once patient in stable from a medical standpoint.          58 Elina Arzate, DO  10/4/2017
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
## 2017-10-04 NOTE — PLAN OF CARE
Problem: Pain:  Goal: Pain level will decrease  Pain level will decrease   Outcome: Ongoing  Patient reports feeling awful from drug withdrawal. Physician notified. Comfort measures provided. Patient resting in bed eyes closed. Goal: Control of acute pain  Control of acute pain   Outcome: Ongoing  Patient reports feeling awful from drug withdrawal. Physician notified. Comfort measures provided. Patient resting in bed eyes closed. Goal: Control of chronic pain  Control of chronic pain   Outcome: Ongoing  Patient reports feeling awful from drug withdrawal. Physician notified. Comfort measures provided. Patient resting in bed eyes closed. Problem: Discharge Planning:  Goal: Discharged to appropriate level of care  Discharged to appropriate level of care  Outcome: Ongoing  Patient to be discharged to home or rehab facility when appropriate. Problem: Infection, Septic Shock:  Goal: Will show no infection signs and symptoms  Will show no infection signs and symptoms  Outcome: Ongoing  Patient has inflamed abscess on left neck that she claims is from injecting IV drugs. ID on the care team and managing antibiotics. Problem: Injury - Risk of, Adverse Drug Event  Goal: Absence of drug withdrawal signs and symptoms  Outcome: Ongoing  Patient beginning withdrawal symptoms this shift. Problem: Cardiovascular  Goal: Hemodynamic stability  Outcome: Ongoing  Patients vitals stable so far this shift. Will continue to assess. Vitals:     10/03/17 2332   BP: 109/75   Pulse: 86   Resp: 20   Temp: 99 °F (37.2 °C)   SpO2: 99%       Problem: Emotional/Psychosocial  Goal: Understanding of community resources  Outcome: Ongoing  Support and resources provided. Patient has significant drug history and admits to selling herself for sex to buy drugs. Emotional support provided. Comments:   Care plan reviewed with patient. Patient verbalizes understanding of the plan of care and contribute to goal setting.

## 2017-10-04 NOTE — CONSULTS
Pt seen and examined. See dict Consult. 26 yo  at 15 weeks with 12 year history of drug abuse, currently using $500 of fentanyl / day. +FHTs today  Pt planning to give this baby up for adoption. Plan to get prenatal labs. Start neurontin and nicotine patch for withdrawal.  Spoke with Cj Madera from [address] [facility] and she is working on options for detox. Will follow with you.   Joy Madera 10/4/2017 12:51 PM

## 2017-10-04 NOTE — CONSULTS
Psychiatry Consult    Reason for Consult: Detox and drug abuse  Concern abscess in the neck from drug use. The primary source(s) of information include(s):  patient:  and electronic record. HISTORY OF PRESENT ILLNESS:    Myah Car is a 27 y.o. female who presents to the ED for evaluation of an abscess on her neck. Patient states that she has been an IV heroin user for 10 years, and that she has been trying to gain access through her neck recently. She states that she noticed the abscess forming roughly 1 week ago, but that the abscess has since grown larger, more painful, and gotten more erythematous. Patient states that she came to the ED for treatment on 10/2/17, but that she was not receiving the care that she wanted, so she eloped. She states that she then went to [facility] ED for treatment this morning where they did an ultrasound of the abscess and informed her that she would need surgery. Patient states that she was treated like \"a junkie\" at [facility], so she left and then proceeded to come here. She states that she last used IV drugs yesterday, and that she did not use in her neck. She rates her pain at a 10/10 in severity, and states that the pain feels burning and tight in quality. She states that the pain is continuous in duration, and she denies any modifying factors. Patient reports that she is currently going through withdraw and is experiencing a fever, nausea, tremors, dizziness, and visual disturbances. She also states that she is pregnant, but that she does not know how far along she is. She states that she has irregular menstrual cycles, and that she believes that her last one was in June, but states that she can feel her baby moving. She denies ever receiving an ultrasound for her pregnancy. Patient denies further complaints at initial encounter. PSYCH CONSULT NOTE: Psych consulted for detox and drug use.     Patient seen and interviewed and reports hx of depression for evasive and guarded  Oriented to:person, place and time  Patient Displayed: decreased productivity and poor attention to detail   Cooperation: fair   Affect is inappropriate  Mood is constricted, depressed and irritable   Thought process is circumstantial and flight of ideas  Thought content: paranoid     Suicidal ideation:No  Plan: No Intent: No  Homicidal ideation:No  Plan: No Intent: No  Insight and judgement limited     Assessment:    DSM-5 DIAGNOSES  Schizoaffective disorder; Bipolar type  Substance disorders:  Opioid (heroin) dependence and abuse and Fentanyl abuse and dependence  PTSD    Recommendations    No medications from psychiatry  for now--states she does not need any medications/pregnant.    Patient can be discharged when medically stable  Patient states will follow with Agnes upon discharge--Will call Agnes by herself  Addiction consult   Nursing to contact  for    Call psych for further questions  Discussed the above plan with the psychiatrist, who approves    Time Spent: 40 minutes    Thanks for the consultation  Electronically signed by Jermaine Thompson CNP on 10/4/2017 at 11:41 AM

## 2017-10-04 NOTE — PROGRESS NOTES
[facility]  OCCUPATIONAL THERAPY MISSED TREATMENT NOTE  ACUTE CARE    Date: 10/4/2017  Patient Name: Prieto Richardson        CSN: [id number]   : 1985  (27 y.o.)  Gender: female                REASON FOR MISSED TREATMENT:  Patient refused treatment. Rn CDW [address]. Upon OT arrival pt in bed and reports not needing any therapy and has been getting up on own and completing ADLs independently. Pt reports no needs for OT at this time. Will discontinue orders. Please reorder if new needs arise.

## 2017-10-05 ENCOUNTER — ANESTHESIA (OUTPATIENT)
Dept: OPERATING ROOM | Age: 32
DRG: 951 | End: 2017-10-05
Payer: MEDICAID

## 2017-10-05 ENCOUNTER — ANESTHESIA EVENT (OUTPATIENT)
Dept: OPERATING ROOM | Age: 32
DRG: 951 | End: 2017-10-05
Payer: MEDICAID

## 2017-10-05 VITALS
TEMPERATURE: 98 F | SYSTOLIC BLOOD PRESSURE: 115 MMHG | HEIGHT: 67 IN | DIASTOLIC BLOOD PRESSURE: 76 MMHG | BODY MASS INDEX: 29.46 KG/M2 | RESPIRATION RATE: 23 BRPM | WEIGHT: 187.7 LBS | OXYGEN SATURATION: 98 % | HEART RATE: 71 BPM

## 2017-10-05 VITALS
RESPIRATION RATE: 22 BRPM | SYSTOLIC BLOOD PRESSURE: 104 MMHG | DIASTOLIC BLOOD PRESSURE: 78 MMHG | OXYGEN SATURATION: 99 %

## 2017-10-05 LAB
ALBUMIN SERPL-MCNC: 2.9 G/DL (ref 3.5–5.1)
ALP BLD-CCNC: 91 U/L (ref 38–126)
ALT SERPL-CCNC: 11 U/L (ref 11–66)
ANION GAP SERPL CALCULATED.3IONS-SCNC: 14 MEQ/L (ref 8–16)
AST SERPL-CCNC: 14 U/L (ref 5–40)
BILIRUB SERPL-MCNC: 0.4 MG/DL (ref 0.3–1.2)
BUN BLDV-MCNC: 8 MG/DL (ref 7–22)
CALCIUM SERPL-MCNC: 8.6 MG/DL (ref 8.5–10.5)
CHLORIDE BLD-SCNC: 107 MEQ/L (ref 98–111)
CO2: 17 MEQ/L (ref 23–33)
CREAT SERPL-MCNC: 0.3 MG/DL (ref 0.4–1.2)
GFR SERPL CREATININE-BSD FRML MDRD: > 90 ML/MIN/1.73M2
GLUCOSE BLD-MCNC: 103 MG/DL (ref 70–108)
HCT VFR BLD CALC: 30.2 % (ref 37–47)
HEMOGLOBIN: 10.7 GM/DL (ref 12–16)
MCH RBC QN AUTO: 30.9 PG (ref 27–31)
MCHC RBC AUTO-ENTMCNC: 35.5 GM/DL (ref 33–37)
MCV RBC AUTO: 87.1 FL (ref 81–99)
ORGANISM: ABNORMAL
PDW BLD-RTO: 14.9 % (ref 11.5–14.5)
PLATELET # BLD: 185 THOU/MM3 (ref 130–400)
PMV BLD AUTO: 8.2 MCM (ref 7.4–10.4)
POTASSIUM SERPL-SCNC: 3.5 MEQ/L (ref 3.5–5.2)
RBC # BLD: 3.47 MILL/MM3 (ref 4.2–5.4)
RPR: NONREACTIVE
SODIUM BLD-SCNC: 138 MEQ/L (ref 135–145)
TOTAL PROTEIN: 5.7 G/DL (ref 6.1–8)
URINE CULTURE REFLEX: ABNORMAL
WBC # BLD: 7.5 THOU/MM3 (ref 4.8–10.8)

## 2017-10-05 PROCEDURE — 0WJ60ZZ INSPECTION OF NECK, OPEN APPROACH: ICD-10-PCS | Performed by: SURGERY

## 2017-10-05 PROCEDURE — 2500000003 HC RX 250 WO HCPCS: Performed by: INTERNAL MEDICINE

## 2017-10-05 PROCEDURE — 6370000000 HC RX 637 (ALT 250 FOR IP): Performed by: HOSPITALIST

## 2017-10-05 PROCEDURE — 3600000012 HC SURGERY LEVEL 2 ADDTL 15MIN: Performed by: SURGERY

## 2017-10-05 PROCEDURE — 6360000002 HC RX W HCPCS: Performed by: HOSPITALIST

## 2017-10-05 PROCEDURE — 85027 COMPLETE CBC AUTOMATED: CPT

## 2017-10-05 PROCEDURE — 6360000002 HC RX W HCPCS: Performed by: NURSE ANESTHETIST, CERTIFIED REGISTERED

## 2017-10-05 PROCEDURE — 7100000000 HC PACU RECOVERY - FIRST 15 MIN: Performed by: SURGERY

## 2017-10-05 PROCEDURE — 7100000001 HC PACU RECOVERY - ADDTL 15 MIN: Performed by: SURGERY

## 2017-10-05 PROCEDURE — 2580000003 HC RX 258: Performed by: INTERNAL MEDICINE

## 2017-10-05 PROCEDURE — 6360000002 HC RX W HCPCS

## 2017-10-05 PROCEDURE — 2500000003 HC RX 250 WO HCPCS: Performed by: NURSE ANESTHETIST, CERTIFIED REGISTERED

## 2017-10-05 PROCEDURE — 3700000000 HC ANESTHESIA ATTENDED CARE: Performed by: SURGERY

## 2017-10-05 PROCEDURE — 99238 HOSP IP/OBS DSCHRG MGMT 30/<: CPT | Performed by: HOSPITALIST

## 2017-10-05 PROCEDURE — 3600000002 HC SURGERY LEVEL 2 BASE: Performed by: SURGERY

## 2017-10-05 PROCEDURE — 6370000000 HC RX 637 (ALT 250 FOR IP): Performed by: OBSTETRICS & GYNECOLOGY

## 2017-10-05 PROCEDURE — 6360000002 HC RX W HCPCS: Performed by: ANESTHESIOLOGY

## 2017-10-05 PROCEDURE — 36415 COLL VENOUS BLD VENIPUNCTURE: CPT

## 2017-10-05 PROCEDURE — 80053 COMPREHEN METABOLIC PANEL: CPT

## 2017-10-05 PROCEDURE — 2500000003 HC RX 250 WO HCPCS: Performed by: SURGERY

## 2017-10-05 PROCEDURE — 3700000001 HC ADD 15 MINUTES (ANESTHESIA): Performed by: SURGERY

## 2017-10-05 PROCEDURE — 10060 I&D ABSCESS SIMPLE/SINGLE: CPT | Performed by: SURGERY

## 2017-10-05 RX ORDER — LABETALOL HYDROCHLORIDE 5 MG/ML
10 INJECTION, SOLUTION INTRAVENOUS EVERY 10 MIN PRN
Status: DISCONTINUED | OUTPATIENT
Start: 2017-10-05 | End: 2017-10-05 | Stop reason: HOSPADM

## 2017-10-05 RX ORDER — KETAMINE HYDROCHLORIDE 50 MG/ML
INJECTION, SOLUTION, CONCENTRATE INTRAMUSCULAR; INTRAVENOUS PRN
Status: DISCONTINUED | OUTPATIENT
Start: 2017-10-05 | End: 2017-10-05 | Stop reason: SDUPTHER

## 2017-10-05 RX ORDER — FENTANYL CITRATE 50 UG/ML
INJECTION, SOLUTION INTRAMUSCULAR; INTRAVENOUS PRN
Status: DISCONTINUED | OUTPATIENT
Start: 2017-10-05 | End: 2017-10-05 | Stop reason: SDUPTHER

## 2017-10-05 RX ORDER — BUPIVACAINE HYDROCHLORIDE AND EPINEPHRINE 5; 5 MG/ML; UG/ML
INJECTION, SOLUTION EPIDURAL; INTRACAUDAL; PERINEURAL PRN
Status: DISCONTINUED | OUTPATIENT
Start: 2017-10-05 | End: 2017-10-05 | Stop reason: HOSPADM

## 2017-10-05 RX ORDER — PROMETHAZINE HYDROCHLORIDE 25 MG/ML
25 INJECTION, SOLUTION INTRAMUSCULAR; INTRAVENOUS ONCE
Status: COMPLETED | OUTPATIENT
Start: 2017-10-05 | End: 2017-10-05

## 2017-10-05 RX ORDER — LIDOCAINE HYDROCHLORIDE 20 MG/ML
INJECTION, SOLUTION INFILTRATION; PERINEURAL PRN
Status: DISCONTINUED | OUTPATIENT
Start: 2017-10-05 | End: 2017-10-05 | Stop reason: SDUPTHER

## 2017-10-05 RX ORDER — PROMETHAZINE HYDROCHLORIDE 25 MG/ML
INJECTION, SOLUTION INTRAMUSCULAR; INTRAVENOUS
Status: COMPLETED
Start: 2017-10-05 | End: 2017-10-05

## 2017-10-05 RX ADMIN — FENTANYL CITRATE 50 MCG: 50 INJECTION INTRAMUSCULAR; INTRAVENOUS at 07:25

## 2017-10-05 RX ADMIN — HYDROMORPHONE HYDROCHLORIDE 5 MG: 2 TABLET ORAL at 03:54

## 2017-10-05 RX ADMIN — SODIUM CHLORIDE: 9 INJECTION, SOLUTION INTRAVENOUS at 04:03

## 2017-10-05 RX ADMIN — PROMETHAZINE HYDROCHLORIDE 25 MG: 25 INJECTION INTRAMUSCULAR; INTRAVENOUS at 08:10

## 2017-10-05 RX ADMIN — KETAMINE HYDROCHLORIDE 10 MG: 50 INJECTION, SOLUTION INTRAMUSCULAR; INTRAVENOUS at 07:25

## 2017-10-05 RX ADMIN — HYDROMORPHONE HYDROCHLORIDE 0.5 MG: 1 INJECTION, SOLUTION INTRAMUSCULAR; INTRAVENOUS; SUBCUTANEOUS at 08:05

## 2017-10-05 RX ADMIN — KETAMINE HYDROCHLORIDE 10 MG: 50 INJECTION, SOLUTION INTRAMUSCULAR; INTRAVENOUS at 07:30

## 2017-10-05 RX ADMIN — PROPOFOL 50 MG: 10 INJECTION, EMULSION INTRAVENOUS at 07:25

## 2017-10-05 RX ADMIN — FENTANYL CITRATE 50 MCG: 50 INJECTION INTRAMUSCULAR; INTRAVENOUS at 07:20

## 2017-10-05 RX ADMIN — PROMETHAZINE HYDROCHLORIDE 25 MG: 25 INJECTION, SOLUTION INTRAMUSCULAR; INTRAVENOUS at 08:10

## 2017-10-05 RX ADMIN — METOCLOPRAMIDE 10 MG: 5 INJECTION, SOLUTION INTRAMUSCULAR; INTRAVENOUS at 03:54

## 2017-10-05 RX ADMIN — LIDOCAINE HYDROCHLORIDE 25 MG: 20 INJECTION, SOLUTION INFILTRATION; PERINEURAL at 07:20

## 2017-10-05 RX ADMIN — PROPOFOL 50 MG: 10 INJECTION, EMULSION INTRAVENOUS at 07:20

## 2017-10-05 RX ADMIN — CLINDAMYCIN PHOSPHATE 600 MG: 12 INJECTION, SOLUTION INTRAMUSCULAR; INTRAVENOUS at 03:54

## 2017-10-05 RX ADMIN — GABAPENTIN 300 MG: 600 TABLET ORAL at 08:59

## 2017-10-05 RX ADMIN — HYDROMORPHONE HYDROCHLORIDE 0.5 MG: 1 INJECTION, SOLUTION INTRAMUSCULAR; INTRAVENOUS; SUBCUTANEOUS at 08:00

## 2017-10-05 ASSESSMENT — PAIN DESCRIPTION - ORIENTATION: ORIENTATION: LEFT

## 2017-10-05 ASSESSMENT — PULMONARY FUNCTION TESTS
PIF_VALUE: 0

## 2017-10-05 ASSESSMENT — PAIN SCALES - GENERAL
PAINLEVEL_OUTOF10: 5
PAINLEVEL_OUTOF10: 10
PAINLEVEL_OUTOF10: 10
PAINLEVEL_OUTOF10: 8

## 2017-10-05 ASSESSMENT — PAIN DESCRIPTION - PAIN TYPE
TYPE: SURGICAL PAIN
TYPE: SURGICAL PAIN

## 2017-10-05 ASSESSMENT — PAIN DESCRIPTION - LOCATION
LOCATION: NECK
LOCATION: NECK

## 2017-10-05 NOTE — ANESTHESIA PRE PROCEDURE
Department of Anesthesiology  Preprocedure Note       Name:  Jarett Holland   Age:  27 y.o.  :  1985                                          MRN:  [id number]         Date:  10/5/2017      Surgeon: Juan David Ferrer):  Ness Herron MD    Procedure: Procedure(s):  I & D LEFT NECK ABSCESS    Medications prior to admission:   Prior to Admission medications    Not on File       Current medications:    Current Facility-Administered Medications   Medication Dose Route Frequency Provider Last Rate Last Dose    influenza quadrivalent split vaccine (FLUZONE;FLUARIX) injection 0.5 mL  0.5 mL Intramuscular Once Zelpha Bath Dori, DO        metoclopramide (REGLAN) injection 10 mg  10 mg Intravenous Q6H Mu Meadows, DO   10 mg at 10/05/17 0354    gabapentin (NEURONTIN) tablet 300 mg  300 mg Oral TID Lo Callahan MD   300 mg at 10/04/17 2120    nicotine (NICODERM CQ) 21 MG/24HR 1 patch  1 patch Transdermal Daily Lo Callahan MD   1 patch at 10/04/17 1355    HYDROmorphone (DILAUDID) tablet 5 mg  5 mg Oral Q4H PRN Zelpha Bath Dori, DO   5 mg at 10/05/17 0354    busPIRone (BUSPAR) tablet 10 mg  10 mg Oral TID Jesi Udeagbala, DO   10 mg at 10/04/17 2121    0.9 % sodium chloride infusion   Intravenous Continuous Johanny Mosher MD 30 mL/hr at 10/05/17 0403      sodium chloride flush 0.9 % injection 10 mL  10 mL Intravenous 2 times per day Johanny Mosher MD        sodium chloride flush 0.9 % injection 10 mL  10 mL Intravenous PRN Johanny Mosher MD   10 mL at 10/03/17 1622    magnesium hydroxide (MILK OF MAGNESIA) 400 MG/5ML suspension 30 mL  30 mL Oral Daily PRN Johanny Mosher MD        ondansetron [facility]) injection 4 mg  4 mg Intravenous Q6H PRN Johanny Mosher MD        clindamycin (CLEOCIN) 600 mg in dextrose 5 % 50 mL IVPB  600 mg Intravenous Esteban Mauro MD   Stopped at 10/05/17 0424    acetaminophen (TYLENOL) tablet 650 mg  650 mg Oral Q4H PRN Gerson Jackson MD       [facility]

## 2017-10-05 NOTE — CONSULTS
[address] [place] [address]                                 CONSULTATION    PATIENT NAME: Addison Santoro                     :             1985  MED REC NO:   [id number]                            ROOM:            0022  ACCOUNT NO:   [de-identified]                            ADMISSION DATE:  10/03/2017  PROVIDER:    Talha Jimenez. Ganesh Hernandez M.D.    Neyda Prajapati:  10/04/2017    REASON FOR CONSULTATION:  The patient is 15 weeks' pregnant. CHIEF COMPLAINT:  \"Dope sick. \"    HISTORY OF PRESENT ILLNESS:  The patient is a 73-year-old G10, P8 at  13 weeks' gestation who is a 12-year drug abuser and addicted to  opiates, cocaine, marijuana, and tobacco.  She would like to get  cleaned, but knows she cannot do this without inpatient detox and help  from the medical system. She is currently uses 500 dollar worth of  fentanyl a day and indicates to me that would probably cause death in  most heroine users actually, but that her tolerance is that great. From her pregnancy standpoint, she thought she was about 5 months'  pregnant, however, ultrasound revealed that she is 15 weeks' pregnant  with a viable fetus. She does not have vaginal bleeding, but does  have some vaginal discharge and wishes to maintain this pregnancy. She reports to me that she would like help with the detox, but is  clearly ambivalent even during our interview. She threatened to leave  against medical advice. She has had all vaginal deliveries, but she  does not have custody for the children. She has not received adequate  prenatal care from many of her pregnancies. She has been incarcerated  in the past and has been able to be cleaned during that time only. Postpartum this pregnancy she would like to have a tubal ligation. The patient presented to the hospital with a neck abscess from  injecting internal jugular vein. She is here being treated for that.     PAST MEDICAL HISTORY:  Hepatitis C, bipolar disorder, borderline  personality disorder. PAST SURGICAL HISTORY:  D and C, x1 and wisdom teeth. SOCIAL HISTORY:  The patient is a long-term drug abuser, using any  drugs that she can obtain. This has escalated over the course of the  time. She currently reports heroin not sufficient to give her any  relief and that only fentanyl allows her to get a high off the drugs. As previously stated her current usage is about $500 worth of fentanyl  a day. Employment, she prostitutes herself for the drugs. Cigarettes, she is a 60-pack-a-year smoker and she does not have  custody for her children. ALLERGIES:  PENICILLIN, KETOROLAC, NAPROSYN, NUBAIN. CURRENT MEDICATIONS:  Inside the hospital include ceftriaxone,  clindamycin, Lovenox, Dilaudid, milk of magnesia, Reglan, and Zofran. PHYSICAL EXAMINATION:  GENERAL:  Well developed, well nourished white female, quite jittery  and anxious, and talkative. NECK:  Her left neck has a Band-Aid over the drained abscess. Still  with about a  2 cm area of induration and 4 cm area of erythema, which  she reports as markedly improved from the day prior. LUNGS:  Clear. ABDOMEN:  Soft and nontender. PELVIC:  Deferred as the patient is quite agitated. IMAGING:  Ultrasound reveals 15 weeks' fetus. ASSESSMENT:  A 73-year-old with IV drug use and 15 weeks' pregnant  with a neck abscess. PLAN:  I certainly agree with ID's recommendation for clindamycin for  her abscess until sensitivities are back. I have ordered  metronidazole for vaginal trichomonas and I have ordered hepatitis C  viral load as well as typical prenatal labs. I have ordered Neurontin and nicotine patch in order to help her with  her withdrawal.    I spoke with Alfred Le from social work and she is working diligently in  helping the patient to find a way to get into a detox program.    I will follow with you.   Thank you for allowing me to participate

## 2017-10-05 NOTE — DISCHARGE SUMMARY
Physician Discharge Summary     Patient ID:  Prieto Richardson  [id number]  22 y.o.  1985    Admit date: 10/3/2017    Discharge date and time: 10/5/2017  9:28 AM     Admitting Physician: James Rudolph MD     Discharge Physician: Kareem Darden DO    Admission Diagnoses: Abscess [L02.91]  Abscess [L02.91]    Discharge Diagnoses:     1. 2.6 x 2.4 x 1.7 cm Left Neck Abscess in the setting of IVDA with Heroin and Fentanyl. 2. Acute Opiate Withdrawal.    3. Pregnant at ~15 weeks of gestation. 4. Hepatitis C.    5. Borderline Personality disorder. 6. Patient left AMA in spite of numerous staff members trying to help her to stay and get the help she needs. Admission Condition: serious    Discharged Condition: serious    Indication for Admission: Left neck abscess. Hospital Course: The patient is a 58-year-old female, drug  abuser, who has been abusing IV drugs for more than 10 years, has  essentially had been diagnosed with being pregnant with 15 weeks of  pregnancy and also essentially appears to have been shooting her drugs  IV and ended up shooting the drug in the left side of the neck and  presented to the hospital with what appears to be problems with fever,  and the patient is noted to have abscess there. She had been at [facility] and then ended up coming to [address] [place] [address]. The patient  did have a needle aspiration done on the neck abscess and clindamycin  given after the ER called ID physician. The patient is positive for cocaine and  cannabinoids in the urine toxicology. She was started on IV Clindamycin and was   Taken for I&D and treated for acute opiate withdrawal to prevent fetal loss.   She then left AMA  In spite of numerous staff members encouraging her to stay.     PAST MEDICAL HISTORY:  Significant for bipolar disorder, hep C, opiate  use, abnormal Pap smears, scoliosis, and temporomandibular joint  disorder.     PAST SURGICAL HISTORY:  Significant for mole surgery, D and C. Consults: ID and general surgery    Significant Diagnostic Studies:   US HEAD NECK SOFT TISSUE THYROID    Status: Final result         Order Providers      Authorizing Billing     Ceasar Combs, LAUREN Mendoza. Dolores De La Rosa MD     Replaced:  US DRAIN SOFT TISSUE ABSCESS       Signed by      Signed Date/Time    Phone Pager     Wyevangelina AKASH Antoine 10/03/2017 12:21 [phone]        Reading Radiologists      Read Date Phone Pager     AKASH WILSON Oct 3, 2017 [phone]        Routing History      Priority Sent On From To Message Type      10/5/2017 11:13 AM Kenn, Shorty Harley Incoming Lab Results From [employer], NP Results      10/4/2017  1:49 PM Kenn, Shorty Harley Incoming Lab Results From Soft Tomasa Horner MD CC'd Results      10/4/2017  4:45 AM Kenn, Shorty Epp Incoming Lab Results From Soft Tobin Stovall MD CC'd Results      10/4/2017  4:45 AM Kenn, Shorty Epp Incoming Lab Results From Soft Keely Hernadez MD CC'd Results      10/4/2017  4:45 AM Kenn, Shorty Harley Incoming Lab Results From [address] MD [facility] CC'd Results       Narrative   PROCEDURE: US HEAD NECK SOFT TISSUE THYROID       CLINICAL INFORMATION: Raised bump on lateral left neck; Evaluate left side of neck for abscess; patient using area for IV drugs       COMPARISON: No prior study.       TECHNIQUE: Multiple grayscale and color Doppler images of the left neck in longitudinal and transverse planes.       FINDINGS: In the area of interest along the left neck there is a heterogeneous collection measuring 2.6 x 2.4 x 1.7 cm, approximately 7 mm deep to the skin surface with associated hyperemia, suspicious for abscess.  No other sonographic normality is    demonstrated in the visualized left neck.           Impression   Probable abscess in the soft tissues of the left neck as described.                   **This report has been created using voice recognition software.  It may contain minor errors which are inherent in voice

## 2017-10-05 NOTE — PLAN OF CARE
injury   Outcome: Ongoing  Patient has telesitter in room for patient safety. Patient still impulsive and restless. Patient ambulates in room frequently. Patient educated on fall precautions and risk to her safety. Comments:   Care plan reviewed with patient. Patient  verbalize understanding of the plan of care and contribute to goal setting.

## 2017-10-05 NOTE — PROGRESS NOTES
Discharge teaching and instructions for diagnosis/procedure of abscess completed with patient using teachback method. AVS reviewed. No new prescriptions ordered. Patient voiced understanding regarding prescriptions, follow up appointments, and care of self at home.  Discharged ambulatory to  home with support per family

## 2017-10-05 NOTE — CARE COORDINATION
10/4/17, 3:07 PM    DISCHARGE BARRIERS      ANITRA is unable to speak to patient at this time due to medications to assist with her withdraw. ANITRA called [facility] this morning and again this afternoon. ANITRA just received a call back from the nurse, Yulia Lozoya, stating that the patient has not been seen at their agency for one year. She will require an updated diagnostic done by the staff there, Timothy Bullard. Await return call for this (ANITRA did give the nurse's cell phone to Yulia Lozoya if SW gone for the day). ANITRA called and made report to [address] [place] [address], spoke to Daria, but since patient has not delivered and does not have any children in the home, they will not be involved until she delivers. Full assessment not completed at this time due to medical course and out come of that.   Await return call from Agnes to assist once she is released from [facility]
10/5/17, 9:34 AM    DISCHARGE BARRIERS    SW attempted to meet with Patient and Patient left AMA.
Assistance Purchasing Medications:  Yes  Does patient want to participate in local refill/ meds to beds program?  No  Type of Home Care Services:  None  Patient expects to be discharged to:  to [facility] for drug rehab program w/ Dr Karan Coats  Expected Discharge date:  10/06/17  Follow Up Appointment: Best Day/ Time: Monday AM    Discharge Plan: met with client; denied needs; lives with mother Ray Situ, monitor for PICC/AB needs with supervision @ [place]; will ask physician, PT/OT following, current with [facility] Evaluation: yes    Update: client left AMA    10/5/17, 10:56 AM    Discharge plan discussed by  and . Discharge plan reviewed with patient/ family. Patient/ family verbalize understanding of discharge plan and are in agreement with plan. Understanding was demonstrated using the teach back method.

## 2017-10-05 NOTE — ANESTHESIA POSTPROCEDURE EVALUATION
Department of Anesthesiology  Postprocedure Note    Patient: Jarett Holland  MRN: [id number]  YOB: 1985  Date of evaluation: 10/5/2017  Time:  1:59 PM     Procedure Summary     Date Anesthesia Start Anesthesia Stop Room / Location    10/05/17 0724 0746 [facility] OR 03 / [facility] OR       Procedure Diagnosis Surgeon Responsible Provider    I & D LEFT NECK ABSCESS (Left ) (I & D LEFT NECK ABSCESS) MD Erwin Iglesias MD          Anesthesia Type: MAC    Margaret Phase I: Margaret Score: 10    Margaret Phase II:      Last vitals:  BP      Temp      Pulse     Resp      SpO2        Anesthesia Post Evaluation    Patient location during evaluation: PACU  Patient participation: complete - patient participated  Level of consciousness: awake  Pain score: 5  Airway patency: patent  Nausea & Vomiting: no vomiting and no nausea  Complications: no  Cardiovascular status: hemodynamically stable  Respiratory status: acceptable  Hydration status: stable

## 2017-10-06 LAB
AEROBIC CULTURE: ABNORMAL
ANAEROBIC CULTURE: ABNORMAL
GRAM STAIN RESULT: ABNORMAL
ORGANISM: ABNORMAL
ORGANISM: ABNORMAL

## 2017-10-06 NOTE — PROGRESS NOTES
GYN PROGRESS NOTE    HD #2    SUBJECTIVE  Pt s/p drainage. Reports she will stay if she can go smoke. I need to do pelvic exam and we will arrange for her to come tomorrow to the office. OBJECTIVE  /76  Pulse 71  Temp 98 °F (36.7 °C) (Temporal)   Resp 23  Ht 5' 7\" (1.702 m)  Wt 187 lb 11.2 oz (85.1 kg)  LMP 07/09/2017  SpO2 98%  BMI 29.4 kg/m2    ABD: soft nt  INCISION of neck covered      Lab Results   Component Value Date    WBC 7.5 10/05/2017    HGB 10.7 (L) 10/05/2017    HCT 30.2 (L) 10/05/2017    MCV 87.1 10/05/2017     10/05/2017     Lab Results   Component Value Date     10/05/2017    K 3.5 10/05/2017     10/05/2017    CO2 17 (L) 10/05/2017    BUN 8 10/05/2017    CREATININE 0.3 (L) 10/05/2017    GLUCOSE 103 10/05/2017    CALCIUM 8.6 10/05/2017    PROT 5.7 (L) 10/05/2017    LABALBU 2.9 (L) 10/05/2017    BILITOT 0.4 10/05/2017    ALKPHOS 91 10/05/2017    AST 14 10/05/2017    ALT 11 10/05/2017    LABGLOM >90 10/05/2017             Intake/Output Summary (Last 24 hours) at 10/05/17 2059  Last data filed at 10/05/17 0835   Gross per 24 hour   Intake          1120.02 ml   Output             1205 ml   Net           -84.98 ml       ASSESSMENT/PLAN    Improved from ID perspective but unlikely to stay from addiction standpoint.    Jill Madera 10/5/2017 9:02 PM

## 2017-10-07 LAB
EER HCV RNA QNT PCR: ABNORMAL
HCV RNA QNT REAL-TIME PCR INTERP: DETECTED
HCV RNA QUANT BDNA IU: 6.7 LOG IU
HCV RNA QUANT PCR IU/ML: ABNORMAL IU/ML

## 2017-10-08 LAB
BLOOD CULTURE, ROUTINE: NORMAL
BLOOD CULTURE, ROUTINE: NORMAL

## 2017-10-10 ENCOUNTER — HOSPITAL ENCOUNTER (EMERGENCY)
Age: 32
Discharge: HOME | End: 2017-10-10
Attending: EMERGENCY MEDICINE
Payer: MEDICAID

## 2017-10-10 VITALS
BODY MASS INDEX: 26.2 KG/M2 | RESPIRATION RATE: 20 BRPM | HEART RATE: 76 BPM | OXYGEN SATURATION: 96 % | HEIGHT: 66 IN | DIASTOLIC BLOOD PRESSURE: 60 MMHG | TEMPERATURE: 98.3 F | SYSTOLIC BLOOD PRESSURE: 90 MMHG | WEIGHT: 163 LBS

## 2017-10-10 DIAGNOSIS — O26.92 COMPLICATION OF PREGNANCY, ANTEPARTUM, SECOND TRIMESTER: ICD-10-CM

## 2017-10-10 DIAGNOSIS — F14.90 CRACK COCAINE USE: ICD-10-CM

## 2017-10-10 DIAGNOSIS — F11.10 HEROIN ABUSE (HCC): Primary | ICD-10-CM

## 2017-10-10 LAB
ALBUMIN SERPL-MCNC: 3.4 G/DL (ref 3.5–5.1)
ALP BLD-CCNC: 85 U/L (ref 38–126)
ALT SERPL-CCNC: 28 U/L (ref 11–66)
ANION GAP SERPL CALCULATED.3IONS-SCNC: 12 MEQ/L (ref 8–16)
ANISOCYTOSIS: ABNORMAL
AST SERPL-CCNC: 25 U/L (ref 5–40)
BASOPHILS # BLD: 0.5 %
BASOPHILS ABSOLUTE: 0 THOU/MM3 (ref 0–0.1)
BILIRUB SERPL-MCNC: 0.3 MG/DL (ref 0.3–1.2)
BUN BLDV-MCNC: 6 MG/DL (ref 7–22)
CALCIUM SERPL-MCNC: 8.4 MG/DL (ref 8.5–10.5)
CHLORIDE BLD-SCNC: 105 MEQ/L (ref 98–111)
CO2: 22 MEQ/L (ref 23–33)
CREAT SERPL-MCNC: 0.3 MG/DL (ref 0.4–1.2)
EOSINOPHIL # BLD: 2.3 %
EOSINOPHILS ABSOLUTE: 0.2 THOU/MM3 (ref 0–0.4)
GFR SERPL CREATININE-BSD FRML MDRD: > 90 ML/MIN/1.73M2
GLUCOSE BLD-MCNC: 106 MG/DL (ref 70–108)
GLUCOSE BLD-MCNC: 113 MG/DL (ref 70–108)
HCT VFR BLD CALC: 34.4 % (ref 37–47)
HEMOGLOBIN: 11.9 GM/DL (ref 12–16)
LYMPHOCYTES # BLD: 20.1 %
LYMPHOCYTES ABSOLUTE: 1.5 THOU/MM3 (ref 1–4.8)
MCH RBC QN AUTO: 30.9 PG (ref 27–31)
MCHC RBC AUTO-ENTMCNC: 34.6 GM/DL (ref 33–37)
MCV RBC AUTO: 89.1 FL (ref 81–99)
MONOCYTES # BLD: 3.1 %
MONOCYTES ABSOLUTE: 0.2 THOU/MM3 (ref 0.4–1.3)
NUCLEATED RED BLOOD CELLS: 0 /100 WBC
OSMOLALITY CALCULATION: 276 MOSMOL/KG (ref 275–300)
PDW BLD-RTO: 16.3 % (ref 11.5–14.5)
PLATELET # BLD: 205 THOU/MM3 (ref 130–400)
PMV BLD AUTO: 8 MCM (ref 7.4–10.4)
POTASSIUM SERPL-SCNC: 3.8 MEQ/L (ref 3.5–5.2)
RBC # BLD: 3.86 MILL/MM3 (ref 4.2–5.4)
RBC # BLD: NORMAL 10*6/UL
SEG NEUTROPHILS: 74 %
SEGMENTED NEUTROPHILS ABSOLUTE COUNT: 5.6 THOU/MM3 (ref 1.8–7.7)
SODIUM BLD-SCNC: 139 MEQ/L (ref 135–145)
TOTAL PROTEIN: 5.9 G/DL (ref 6.1–8)
WBC # BLD: 7.5 THOU/MM3 (ref 4.8–10.8)

## 2017-10-10 PROCEDURE — 99284 EMERGENCY DEPT VISIT MOD MDM: CPT

## 2017-10-10 PROCEDURE — 80053 COMPREHEN METABOLIC PANEL: CPT

## 2017-10-10 PROCEDURE — 36415 COLL VENOUS BLD VENIPUNCTURE: CPT

## 2017-10-10 PROCEDURE — 82948 REAGENT STRIP/BLOOD GLUCOSE: CPT

## 2017-10-10 PROCEDURE — 85025 COMPLETE CBC W/AUTO DIFF WBC: CPT

## 2017-10-10 NOTE — ED PROVIDER NOTES
High Cholesterol Mother     Other Father     Substance Abuse Father     Cancer Maternal Grandmother     Diabetes Maternal Grandmother        SOCIAL HISTORY    Social History     Social History    Marital status: Single     Spouse name: N/A    Number of children: 0    Years of education: N/A     Social History Main Topics    Smoking status: Current Every Day Smoker     Packs/day: 3.00     Years: 20.00     Types: Cigarettes    Smokeless tobacco: Never Used    Alcohol use No    Drug use:      Types: Marijuana, IV, Cocaine, Opiates       Comment: heroin 9-10 used daily,fentanyl (pure for the last year)     Sexual activity: Yes     Partners: Male      Comment: Exposure to Hepatitis C     Other Topics Concern    None     Social History Narrative    None       REVIEW OF SYSTEMS    Positive for tiredness and drug use. Negative for headache neck pain chest pain abdominal pain or other problems  All systems negative except as marked. PHYSICAL EXAM    VITAL SIGNS: BP 90/60   Pulse 76   Temp 98.3 °F (36.8 °C) (Oral)   Resp 20   Ht 5' 6\" (1.676 m)   Wt 163 lb (73.9 kg)   LMP 07/09/2017   SpO2 96%   BMI 26.31 kg/m²    Constitutional:  Somnolent but easily arouses  HENT:  Normocephalic, Atraumatic, Bilateral external ears normal, Oropharynx moist, No oral exudates, Nose normal.  Cervical Spine: Normal range of motion,  No stridor. No tenderness, Supple, old small abscess to the left anterior neck without discharge drainage or redness. Eyes:  No discharge or  Swelling,Conjunctiva normal, PERRL, EOMI,  Respiratory: No respiratory distress, Normal breath sounds,  No wheezing, No chest tenderness. Cardiovascular:  Normal heart rate, Normal rhythm, No murmurs, No rubs, No gallops. GI:  No reproducible pain, Bowel sounds normal, Soft, No masses, No pulsatile masses. No tenderness  Musculoskeletal:  Intact distal pulses, No edema, No tenderness, No cyanosis, No clubbing.  Good range of motion in all major joints. No tenderness to palpation or major deformities noted. Back:No tenderness. Integument:  Warm, Dry, No erythema, multiple injection sites and bruising arms breasts( exposed with nursing in the room, Isabel and milli)  Lymphatic:  No lymphadenopathy noted. Neurologic:    somnolent but easily arouses moves all fours. RADIOLOGY    No orders to display       PROCEDURES    none      CONSULTS:  Dr. Zeinab Patiño Using perfect serve    CRITICAL CARE:  None      ED COURSE & MEDICAL DECISION MAKING    Pertinent Labs & Imaging studies reviewed. (See chart for details)  Patient admits to heroin use this morning and last night. She also admits to crack cocaine. She felt groggy and tired and was concerned she had carfentanyl. She has not required airway interventions at this time. She easily responds to painful stimuli and gives a coherent history. She's been eating and drinking here without complication. She is currently 16 weeks pregnant. Her fetal heart tones are borderline low 140s although not severe. She is scheduled to see rehabilitation services tomorrow. She is also working with adoptive services. REASSESSMENT  Patient rechecked and updated on lab/xray status, progress and results. .  Patient was reassessed and condition was improved after tx. No further needs at this time. Patient would like to leave against medical advice at this time. I do believe we've monitored her for an adequate time. She's not had any further airway compromise or other issues. Obviously she is at risk with her pregnancy at 16 weeks using crack cocaine and heroin. Recommend further evaluation as an outpatient and she is scheduled to see drug rehabilitation tomorrow. FINAL IMPRESSION    1. Heroin abuse    2. Complication of pregnancy, antepartum, second trimester    3.  Crack cocaine use         PATIENT REFERRED TO:  rehab as scheduled    Call   further testing    Johnathon Briceño MD  560

## 2017-10-10 NOTE — ED TRIAGE NOTES
Patient was driven up to the ambulance bay doors by her stepfather for a reported \"overdose\" on IV narcotics. Patient is an admitted heroin abuser and uses multiple times daily. Today she was unresponsive. She responded to her family with verbal and painful stimuli.  She is awake but drifts to sleep easily on arrival.

## 2017-10-10 NOTE — ED NOTES
Patient woke up from her nap and is more alert. Patient request another sandwich and drink. Gave patient another sandwich and drink. Patient has had 3 turkey sandwiches, 3 apple sauces, 3 Alisa Mist, and 2 OJ.       Li Bales RN  10/10/17 1323

## 2018-02-15 ENCOUNTER — ANESTHESIA (OUTPATIENT)
Dept: LABOR AND DELIVERY | Age: 33
DRG: 540 | End: 2018-02-15
Payer: MEDICAID

## 2018-02-15 ENCOUNTER — HOSPITAL ENCOUNTER (INPATIENT)
Age: 33
LOS: 1 days | Discharge: LEFT BEFORE DISCHARGE | DRG: 540 | End: 2018-02-15
Attending: OBSTETRICS & GYNECOLOGY | Admitting: OBSTETRICS & GYNECOLOGY
Payer: MEDICAID

## 2018-02-15 ENCOUNTER — APPOINTMENT (OUTPATIENT)
Dept: ULTRASOUND IMAGING | Age: 33
DRG: 540 | End: 2018-02-15
Payer: MEDICAID

## 2018-02-15 ENCOUNTER — ANESTHESIA EVENT (OUTPATIENT)
Dept: LABOR AND DELIVERY | Age: 33
DRG: 540 | End: 2018-02-15
Payer: MEDICAID

## 2018-02-15 VITALS
DIASTOLIC BLOOD PRESSURE: 90 MMHG | HEART RATE: 74 BPM | RESPIRATION RATE: 16 BRPM | HEIGHT: 66 IN | WEIGHT: 190 LBS | TEMPERATURE: 97.3 F | OXYGEN SATURATION: 95 % | SYSTOLIC BLOOD PRESSURE: 118 MMHG | BODY MASS INDEX: 30.53 KG/M2

## 2018-02-15 VITALS
DIASTOLIC BLOOD PRESSURE: 81 MMHG | SYSTOLIC BLOOD PRESSURE: 115 MMHG | RESPIRATION RATE: 20 BRPM | TEMPERATURE: 97.5 F | OXYGEN SATURATION: 100 %

## 2018-02-15 PROBLEM — R58 BLEEDING: Status: ACTIVE | Noted: 2018-02-15

## 2018-02-15 LAB
ABO: NORMAL
ALBUMIN SERPL-MCNC: 3 G/DL (ref 3.5–5.1)
ALP BLD-CCNC: 232 U/L (ref 38–126)
ALT SERPL-CCNC: 11 U/L (ref 11–66)
AMPHETAMINE+METHAMPHETAMINE URINE SCREEN: NEGATIVE
ANION GAP SERPL CALCULATED.3IONS-SCNC: 10 MEQ/L (ref 8–16)
ANTIBODY SCREEN: NORMAL
APTT: 26.4 SECONDS (ref 22–38)
AST SERPL-CCNC: 16 U/L (ref 5–40)
BARBITURATE QUANTITATIVE URINE: NEGATIVE
BENZODIAZEPINE QUANTITATIVE URINE: NEGATIVE
BILIRUB SERPL-MCNC: 0.3 MG/DL (ref 0.3–1.2)
BUN BLDV-MCNC: 6 MG/DL (ref 7–22)
CALCIUM SERPL-MCNC: 8.4 MG/DL (ref 8.5–10.5)
CANNABINOID QUANTITATIVE URINE: NEGATIVE
CHLORIDE BLD-SCNC: 104 MEQ/L (ref 98–111)
CO2: 22 MEQ/L (ref 23–33)
COCAINE METABOLITE QUANTITATIVE URINE: POSITIVE
CREAT SERPL-MCNC: 0.3 MG/DL (ref 0.4–1.2)
FIBRINOGEN: 317 MG/100ML (ref 155–475)
GFR SERPL CREATININE-BSD FRML MDRD: > 90 ML/MIN/1.73M2
GLUCOSE BLD-MCNC: 84 MG/DL (ref 70–108)
HCT VFR BLD CALC: 27.9 % (ref 37–47)
HEMOGLOBIN: 9.6 GM/DL (ref 12–16)
INR BLD: 0.9 (ref 0.85–1.13)
MCH RBC QN AUTO: 29.1 PG (ref 27–31)
MCHC RBC AUTO-ENTMCNC: 34.5 GM/DL (ref 33–37)
MCV RBC AUTO: 84.4 FL (ref 81–99)
OPIATES, URINE: POSITIVE
OXYCODONE: NEGATIVE
PDW BLD-RTO: 16.3 % (ref 11.5–14.5)
PHENCYCLIDINE QUANTITATIVE URINE: NEGATIVE
PLATELET # BLD: 204 THOU/MM3 (ref 130–400)
PMV BLD AUTO: 8.2 FL (ref 7.4–10.4)
POTASSIUM SERPL-SCNC: 3.9 MEQ/L (ref 3.5–5.2)
RBC # BLD: 3.31 MILL/MM3 (ref 4.2–5.4)
RH FACTOR: NORMAL
SODIUM BLD-SCNC: 136 MEQ/L (ref 135–145)
TOTAL PROTEIN: 5.8 G/DL (ref 6.1–8)
WBC # BLD: 7.8 THOU/MM3 (ref 4.8–10.8)

## 2018-02-15 PROCEDURE — 80307 DRUG TEST PRSMV CHEM ANLYZR: CPT

## 2018-02-15 PROCEDURE — 1220000001 HC SEMI PRIVATE L&D R&B

## 2018-02-15 PROCEDURE — 2500000003 HC RX 250 WO HCPCS: Performed by: NURSE ANESTHETIST, CERTIFIED REGISTERED

## 2018-02-15 PROCEDURE — 86592 SYPHILIS TEST NON-TREP QUAL: CPT

## 2018-02-15 PROCEDURE — 2500000003 HC RX 250 WO HCPCS

## 2018-02-15 PROCEDURE — 3700000001 HC ADD 15 MINUTES (ANESTHESIA): Performed by: OBSTETRICS & GYNECOLOGY

## 2018-02-15 PROCEDURE — 85385 FIBRINOGEN ANTIGEN: CPT

## 2018-02-15 PROCEDURE — 6360000002 HC RX W HCPCS: Performed by: NURSE ANESTHETIST, CERTIFIED REGISTERED

## 2018-02-15 PROCEDURE — 6360000002 HC RX W HCPCS

## 2018-02-15 PROCEDURE — 4A1HXFZ MONITORING OF PRODUCTS OF CONCEPTION, CARDIAC RHYTHM, EXTERNAL APPROACH: ICD-10-PCS | Performed by: OBSTETRICS & GYNECOLOGY

## 2018-02-15 PROCEDURE — 85610 PROTHROMBIN TIME: CPT

## 2018-02-15 PROCEDURE — 2580000003 HC RX 258: Performed by: OBSTETRICS & GYNECOLOGY

## 2018-02-15 PROCEDURE — 2580000003 HC RX 258: Performed by: NURSE ANESTHETIST, CERTIFIED REGISTERED

## 2018-02-15 PROCEDURE — 86901 BLOOD TYPING SEROLOGIC RH(D): CPT

## 2018-02-15 PROCEDURE — 88307 TISSUE EXAM BY PATHOLOGIST: CPT

## 2018-02-15 PROCEDURE — 7100000000 HC PACU RECOVERY - FIRST 15 MIN: Performed by: OBSTETRICS & GYNECOLOGY

## 2018-02-15 PROCEDURE — 6360000002 HC RX W HCPCS: Performed by: ANESTHESIOLOGY

## 2018-02-15 PROCEDURE — 2500000003 HC RX 250 WO HCPCS: Performed by: ANESTHESIOLOGY

## 2018-02-15 PROCEDURE — 76815 OB US LIMITED FETUS(S): CPT

## 2018-02-15 PROCEDURE — S0028 INJECTION, FAMOTIDINE, 20 MG: HCPCS

## 2018-02-15 PROCEDURE — 80053 COMPREHEN METABOLIC PANEL: CPT

## 2018-02-15 PROCEDURE — 36415 COLL VENOUS BLD VENIPUNCTURE: CPT

## 2018-02-15 PROCEDURE — 3700000000 HC ANESTHESIA ATTENDED CARE: Performed by: OBSTETRICS & GYNECOLOGY

## 2018-02-15 PROCEDURE — 87081 CULTURE SCREEN ONLY: CPT

## 2018-02-15 PROCEDURE — 7100000001 HC PACU RECOVERY - ADDTL 15 MIN: Performed by: OBSTETRICS & GYNECOLOGY

## 2018-02-15 PROCEDURE — 6370000000 HC RX 637 (ALT 250 FOR IP)

## 2018-02-15 PROCEDURE — 86850 RBC ANTIBODY SCREEN: CPT

## 2018-02-15 PROCEDURE — 85730 THROMBOPLASTIN TIME PARTIAL: CPT

## 2018-02-15 PROCEDURE — 86900 BLOOD TYPING SEROLOGIC ABO: CPT

## 2018-02-15 PROCEDURE — 3609079900 HC CESAREAN SECTION: Performed by: OBSTETRICS & GYNECOLOGY

## 2018-02-15 PROCEDURE — 85027 COMPLETE CBC AUTOMATED: CPT

## 2018-02-15 RX ORDER — BETAMETHASONE SODIUM PHOSPHATE AND BETAMETHASONE ACETATE 3; 3 MG/ML; MG/ML
INJECTION, SUSPENSION INTRA-ARTICULAR; INTRALESIONAL; INTRAMUSCULAR; SOFT TISSUE
Status: COMPLETED
Start: 2018-02-15 | End: 2018-02-15

## 2018-02-15 RX ORDER — SIMETHICONE 80 MG
80 TABLET,CHEWABLE ORAL EVERY 6 HOURS PRN
Status: CANCELLED | OUTPATIENT
Start: 2018-02-15

## 2018-02-15 RX ORDER — HYDROCODONE BITARTRATE AND ACETAMINOPHEN 5; 325 MG/1; MG/1
2 TABLET ORAL EVERY 4 HOURS PRN
Status: DISCONTINUED | OUTPATIENT
Start: 2018-02-15 | End: 2018-02-16 | Stop reason: HOSPADM

## 2018-02-15 RX ORDER — SODIUM CHLORIDE 0.9 % (FLUSH) 0.9 %
10 SYRINGE (ML) INJECTION PRN
Status: CANCELLED | OUTPATIENT
Start: 2018-02-15

## 2018-02-15 RX ORDER — MORPHINE SULFATE 2 MG/ML
2 INJECTION, SOLUTION INTRAMUSCULAR; INTRAVENOUS
Status: CANCELLED | OUTPATIENT
Start: 2018-02-15

## 2018-02-15 RX ORDER — MIDAZOLAM HYDROCHLORIDE 1 MG/ML
INJECTION INTRAMUSCULAR; INTRAVENOUS PRN
Status: DISCONTINUED | OUTPATIENT
Start: 2018-02-15 | End: 2018-02-15 | Stop reason: SDUPTHER

## 2018-02-15 RX ORDER — METHYLERGONOVINE MALEATE 0.2 MG/ML
200 INJECTION INTRAVENOUS PRN
Status: CANCELLED | OUTPATIENT
Start: 2018-02-15

## 2018-02-15 RX ORDER — SODIUM CHLORIDE, SODIUM LACTATE, POTASSIUM CHLORIDE, CALCIUM CHLORIDE 600; 310; 30; 20 MG/100ML; MG/100ML; MG/100ML; MG/100ML
INJECTION, SOLUTION INTRAVENOUS CONTINUOUS
Status: CANCELLED | OUTPATIENT
Start: 2018-02-15

## 2018-02-15 RX ORDER — ONDANSETRON 2 MG/ML
4 INJECTION INTRAMUSCULAR; INTRAVENOUS EVERY 6 HOURS PRN
Status: CANCELLED | OUTPATIENT
Start: 2018-02-15

## 2018-02-15 RX ORDER — METOCLOPRAMIDE HYDROCHLORIDE 5 MG/ML
10 INJECTION INTRAMUSCULAR; INTRAVENOUS ONCE
Status: DISCONTINUED | OUTPATIENT
Start: 2018-02-15 | End: 2018-02-16 | Stop reason: HOSPADM

## 2018-02-15 RX ORDER — PRENATAL WITH FERROUS FUM AND FOLIC ACID 3080; 920; 120; 400; 22; 1.84; 3; 20; 10; 1; 12; 200; 27; 25; 2 [IU]/1; [IU]/1; MG/1; [IU]/1; MG/1; MG/1; MG/1; MG/1; MG/1; MG/1; UG/1; MG/1; MG/1; MG/1; MG/1
1 TABLET ORAL DAILY
Status: CANCELLED | OUTPATIENT
Start: 2018-02-15

## 2018-02-15 RX ORDER — ONDANSETRON 2 MG/ML
INJECTION INTRAMUSCULAR; INTRAVENOUS PRN
Status: DISCONTINUED | OUTPATIENT
Start: 2018-02-15 | End: 2018-02-15 | Stop reason: SDUPTHER

## 2018-02-15 RX ORDER — DIPHENHYDRAMINE HYDROCHLORIDE 50 MG/ML
25 INJECTION INTRAMUSCULAR; INTRAVENOUS EVERY 6 HOURS PRN
Status: DISCONTINUED | OUTPATIENT
Start: 2018-02-15 | End: 2018-02-16 | Stop reason: HOSPADM

## 2018-02-15 RX ORDER — OXYCODONE HYDROCHLORIDE AND ACETAMINOPHEN 5; 325 MG/1; MG/1
2 TABLET ORAL EVERY 4 HOURS PRN
Status: CANCELLED | OUTPATIENT
Start: 2018-02-15

## 2018-02-15 RX ORDER — LANOLIN 100 %
OINTMENT (GRAM) TOPICAL
Status: CANCELLED | OUTPATIENT
Start: 2018-02-15

## 2018-02-15 RX ORDER — MORPHINE SULFATE 4 MG/ML
4 INJECTION, SOLUTION INTRAMUSCULAR; INTRAVENOUS
Status: CANCELLED | OUTPATIENT
Start: 2018-02-15

## 2018-02-15 RX ORDER — ONDANSETRON 4 MG/1
8 TABLET, FILM COATED ORAL EVERY 8 HOURS PRN
Status: CANCELLED | OUTPATIENT
Start: 2018-02-15

## 2018-02-15 RX ORDER — SODIUM CHLORIDE 9 MG/ML
INJECTION, SOLUTION INTRAVENOUS CONTINUOUS PRN
Status: DISCONTINUED | OUTPATIENT
Start: 2018-02-15 | End: 2018-02-15 | Stop reason: SDUPTHER

## 2018-02-15 RX ORDER — DIPHENHYDRAMINE HYDROCHLORIDE 50 MG/ML
25 INJECTION INTRAMUSCULAR; INTRAVENOUS EVERY 6 HOURS PRN
Status: CANCELLED | OUTPATIENT
Start: 2018-02-15

## 2018-02-15 RX ORDER — SODIUM CHLORIDE, SODIUM LACTATE, POTASSIUM CHLORIDE, AND CALCIUM CHLORIDE .6; .31; .03; .02 G/100ML; G/100ML; G/100ML; G/100ML
1000 INJECTION, SOLUTION INTRAVENOUS ONCE
Status: DISCONTINUED | OUTPATIENT
Start: 2018-02-15 | End: 2018-02-16 | Stop reason: HOSPADM

## 2018-02-15 RX ORDER — GABAPENTIN 600 MG/1
600 TABLET ORAL 3 TIMES DAILY
Status: CANCELLED | OUTPATIENT
Start: 2018-02-15

## 2018-02-15 RX ORDER — BETAMETHASONE SODIUM PHOSPHATE AND BETAMETHASONE ACETATE 3; 3 MG/ML; MG/ML
12 INJECTION, SUSPENSION INTRA-ARTICULAR; INTRALESIONAL; INTRAMUSCULAR; SOFT TISSUE ONCE
Status: COMPLETED | OUTPATIENT
Start: 2018-02-15 | End: 2018-02-15

## 2018-02-15 RX ORDER — OXYCODONE HYDROCHLORIDE AND ACETAMINOPHEN 5; 325 MG/1; MG/1
1 TABLET ORAL EVERY 4 HOURS PRN
Status: CANCELLED | OUTPATIENT
Start: 2018-02-15

## 2018-02-15 RX ORDER — CARBOPROST TROMETHAMINE 250 UG/ML
250 INJECTION, SOLUTION INTRAMUSCULAR PRN
Status: CANCELLED | OUTPATIENT
Start: 2018-02-15

## 2018-02-15 RX ORDER — DOCUSATE SODIUM 100 MG/1
100 CAPSULE, LIQUID FILLED ORAL 2 TIMES DAILY
Status: CANCELLED | OUTPATIENT
Start: 2018-02-15

## 2018-02-15 RX ORDER — MISOPROSTOL 200 UG/1
800 TABLET ORAL PRN
Status: CANCELLED | OUTPATIENT
Start: 2018-02-15

## 2018-02-15 RX ORDER — ONDANSETRON 2 MG/ML
4 INJECTION INTRAMUSCULAR; INTRAVENOUS EVERY 6 HOURS PRN
Status: DISCONTINUED | OUTPATIENT
Start: 2018-02-15 | End: 2018-02-16 | Stop reason: HOSPADM

## 2018-02-15 RX ORDER — FERROUS SULFATE 325(65) MG
325 TABLET ORAL
Status: CANCELLED | OUTPATIENT
Start: 2018-02-16

## 2018-02-15 RX ORDER — SODIUM CHLORIDE, SODIUM LACTATE, POTASSIUM CHLORIDE, CALCIUM CHLORIDE 600; 310; 30; 20 MG/100ML; MG/100ML; MG/100ML; MG/100ML
INJECTION, SOLUTION INTRAVENOUS CONTINUOUS
Status: DISCONTINUED | OUTPATIENT
Start: 2018-02-15 | End: 2018-02-16 | Stop reason: HOSPADM

## 2018-02-15 RX ORDER — OXYTOCIN 10 [USP'U]/ML
INJECTION, SOLUTION INTRAMUSCULAR; INTRAVENOUS PRN
Status: DISCONTINUED | OUTPATIENT
Start: 2018-02-15 | End: 2018-02-15 | Stop reason: SDUPTHER

## 2018-02-15 RX ORDER — METOCLOPRAMIDE HYDROCHLORIDE 5 MG/ML
INJECTION INTRAMUSCULAR; INTRAVENOUS
Status: COMPLETED
Start: 2018-02-15 | End: 2018-02-15

## 2018-02-15 RX ORDER — MORPHINE SULFATE 0.5 MG/ML
INJECTION, SOLUTION EPIDURAL; INTRATHECAL; INTRAVENOUS PRN
Status: DISCONTINUED | OUTPATIENT
Start: 2018-02-15 | End: 2018-02-15 | Stop reason: SDUPTHER

## 2018-02-15 RX ORDER — TRISODIUM CITRATE DIHYDRATE AND CITRIC ACID MONOHYDRATE 500; 334 MG/5ML; MG/5ML
15 SOLUTION ORAL ONCE
Status: DISCONTINUED | OUTPATIENT
Start: 2018-02-15 | End: 2018-02-16 | Stop reason: HOSPADM

## 2018-02-15 RX ORDER — BISACODYL 10 MG
10 SUPPOSITORY, RECTAL RECTAL DAILY PRN
Status: CANCELLED | OUTPATIENT
Start: 2018-02-15

## 2018-02-15 RX ORDER — BUPIVACAINE HYDROCHLORIDE 7.5 MG/ML
INJECTION, SOLUTION INTRASPINAL PRN
Status: DISCONTINUED | OUTPATIENT
Start: 2018-02-15 | End: 2018-02-15 | Stop reason: SDUPTHER

## 2018-02-15 RX ORDER — TRISODIUM CITRATE DIHYDRATE AND CITRIC ACID MONOHYDRATE 500; 334 MG/5ML; MG/5ML
SOLUTION ORAL
Status: COMPLETED
Start: 2018-02-15 | End: 2018-02-15

## 2018-02-15 RX ORDER — HYDROCODONE BITARTRATE AND ACETAMINOPHEN 5; 325 MG/1; MG/1
1 TABLET ORAL EVERY 4 HOURS PRN
Status: DISCONTINUED | OUTPATIENT
Start: 2018-02-15 | End: 2018-02-16 | Stop reason: HOSPADM

## 2018-02-15 RX ORDER — NALOXONE HYDROCHLORIDE 0.4 MG/ML
0.4 INJECTION, SOLUTION INTRAMUSCULAR; INTRAVENOUS; SUBCUTANEOUS PRN
Status: DISCONTINUED | OUTPATIENT
Start: 2018-02-15 | End: 2018-02-16 | Stop reason: HOSPADM

## 2018-02-15 RX ORDER — AZITHROMYCIN 500 MG/1
INJECTION, POWDER, LYOPHILIZED, FOR SOLUTION INTRAVENOUS
Status: COMPLETED
Start: 2018-02-15 | End: 2018-02-15

## 2018-02-15 RX ORDER — ACETAMINOPHEN 325 MG/1
325 TABLET ORAL EVERY 4 HOURS PRN
Status: DISCONTINUED | OUTPATIENT
Start: 2018-02-15 | End: 2018-02-16 | Stop reason: HOSPADM

## 2018-02-15 RX ORDER — KETAMINE HYDROCHLORIDE 50 MG/ML
INJECTION, SOLUTION, CONCENTRATE INTRAMUSCULAR; INTRAVENOUS PRN
Status: DISCONTINUED | OUTPATIENT
Start: 2018-02-15 | End: 2018-02-15 | Stop reason: SDUPTHER

## 2018-02-15 RX ORDER — FENTANYL CITRATE 50 UG/ML
INJECTION, SOLUTION INTRAMUSCULAR; INTRAVENOUS PRN
Status: DISCONTINUED | OUTPATIENT
Start: 2018-02-15 | End: 2018-02-15 | Stop reason: SDUPTHER

## 2018-02-15 RX ORDER — DEXTROSE MONOHYDRATE 50 MG/ML
INJECTION, SOLUTION INTRAVENOUS
Status: DISCONTINUED
Start: 2018-02-15 | End: 2018-02-16 | Stop reason: HOSPADM

## 2018-02-15 RX ORDER — SODIUM CHLORIDE 0.9 % (FLUSH) 0.9 %
10 SYRINGE (ML) INJECTION EVERY 12 HOURS SCHEDULED
Status: CANCELLED | OUTPATIENT
Start: 2018-02-15

## 2018-02-15 RX ORDER — NICOTINE 21 MG/24HR
1 PATCH, TRANSDERMAL 24 HOURS TRANSDERMAL DAILY
Status: CANCELLED | OUTPATIENT
Start: 2018-02-15

## 2018-02-15 RX ORDER — ACETAMINOPHEN 325 MG/1
650 TABLET ORAL EVERY 4 HOURS PRN
Status: CANCELLED | OUTPATIENT
Start: 2018-02-15

## 2018-02-15 RX ADMIN — OXYTOCIN 20 UNITS: 10 INJECTION INTRAVENOUS at 18:28

## 2018-02-15 RX ADMIN — KETAMINE HYDROCHLORIDE 25 MG: 50 INJECTION, SOLUTION INTRAMUSCULAR; INTRAVENOUS at 18:25

## 2018-02-15 RX ADMIN — FENTANYL CITRATE 25 MCG: 50 INJECTION INTRAMUSCULAR; INTRAVENOUS at 18:30

## 2018-02-15 RX ADMIN — HYDROMORPHONE HYDROCHLORIDE 0.5 MG: 1 INJECTION, SOLUTION INTRAMUSCULAR; INTRAVENOUS; SUBCUTANEOUS at 20:10

## 2018-02-15 RX ADMIN — CEFAZOLIN SODIUM 2 G: 2 SOLUTION INTRAVENOUS at 17:59

## 2018-02-15 RX ADMIN — FENTANYL CITRATE 25 MCG: 50 INJECTION INTRAMUSCULAR; INTRAVENOUS at 18:41

## 2018-02-15 RX ADMIN — BETAMETHASONE SODIUM PHOSPHATE AND BETAMETHASONE ACETATE 12 MG: 3; 3 INJECTION, SUSPENSION INTRA-ARTICULAR; INTRALESIONAL; INTRAMUSCULAR; SOFT TISSUE at 14:50

## 2018-02-15 RX ADMIN — SODIUM CHLORIDE, POTASSIUM CHLORIDE, SODIUM LACTATE AND CALCIUM CHLORIDE: 600; 310; 30; 20 INJECTION, SOLUTION INTRAVENOUS at 15:43

## 2018-02-15 RX ADMIN — FENTANYL CITRATE 25 MCG: 50 INJECTION INTRAMUSCULAR; INTRAVENOUS at 18:26

## 2018-02-15 RX ADMIN — MORPHINE SULFATE 0.3 MG: 0.5 INJECTION, SOLUTION EPIDURAL; INTRATHECAL; INTRAVENOUS at 18:10

## 2018-02-15 RX ADMIN — SODIUM CHLORIDE, POTASSIUM CHLORIDE, SODIUM LACTATE AND CALCIUM CHLORIDE: 600; 310; 30; 20 INJECTION, SOLUTION INTRAVENOUS at 15:35

## 2018-02-15 RX ADMIN — KETAMINE HYDROCHLORIDE 25 MG: 50 INJECTION, SOLUTION INTRAMUSCULAR; INTRAVENOUS at 18:30

## 2018-02-15 RX ADMIN — FAMOTIDINE 20 MG: 10 INJECTION, SOLUTION INTRAVENOUS at 17:43

## 2018-02-15 RX ADMIN — SODIUM CHLORIDE, POTASSIUM CHLORIDE, SODIUM LACTATE AND CALCIUM CHLORIDE: 600; 310; 30; 20 INJECTION, SOLUTION INTRAVENOUS at 14:00

## 2018-02-15 RX ADMIN — KETAMINE HYDROCHLORIDE 25 MG: 50 INJECTION, SOLUTION INTRAMUSCULAR; INTRAVENOUS at 18:48

## 2018-02-15 RX ADMIN — SODIUM CHLORIDE: 9 INJECTION, SOLUTION INTRAVENOUS at 18:02

## 2018-02-15 RX ADMIN — AZITHROMYCIN MONOHYDRATE 500 MG: 500 INJECTION, POWDER, LYOPHILIZED, FOR SOLUTION INTRAVENOUS at 17:48

## 2018-02-15 RX ADMIN — ONDANSETRON 4 MG: 2 INJECTION INTRAMUSCULAR; INTRAVENOUS at 18:13

## 2018-02-15 RX ADMIN — METOCLOPRAMIDE 10 MG: 5 INJECTION, SOLUTION INTRAMUSCULAR; INTRAVENOUS at 17:41

## 2018-02-15 RX ADMIN — FENTANYL CITRATE 25 MCG: 50 INJECTION INTRAMUSCULAR; INTRAVENOUS at 18:35

## 2018-02-15 RX ADMIN — SODIUM CITRATE AND CITRIC ACID MONOHYDRATE 15 ML: 500; 334 SOLUTION ORAL at 17:44

## 2018-02-15 RX ADMIN — BETAMETHASONE SODIUM PHOSPHATE AND BETAMETHASONE ACETATE 12 MG: 3; 3 INJECTION, SUSPENSION INTRA-ARTICULAR; INTRALESIONAL; INTRAMUSCULAR at 14:50

## 2018-02-15 RX ADMIN — BUPIVACAINE HYDROCHLORIDE 1.6 ML: 7.5 INJECTION, SOLUTION SUBARACHNOID at 18:10

## 2018-02-15 RX ADMIN — MIDAZOLAM HYDROCHLORIDE 2 MG: 1 INJECTION, SOLUTION INTRAMUSCULAR; INTRAVENOUS at 18:29

## 2018-02-15 RX ADMIN — Medication 50 MILLI-UNITS/MIN: at 19:12

## 2018-02-15 RX ADMIN — SODIUM CHLORIDE, POTASSIUM CHLORIDE, SODIUM LACTATE AND CALCIUM CHLORIDE: 600; 310; 30; 20 INJECTION, SOLUTION INTRAVENOUS at 15:38

## 2018-02-15 ASSESSMENT — PULMONARY FUNCTION TESTS
PIF_VALUE: 0
PIF_VALUE: 3
PIF_VALUE: 0
PIF_VALUE: 2
PIF_VALUE: 0

## 2018-02-15 ASSESSMENT — LIFESTYLE VARIABLES: SMOKING_STATUS: 1

## 2018-02-15 ASSESSMENT — PAIN SCALES - GENERAL: PAINLEVEL_OUTOF10: 10

## 2018-02-15 NOTE — FLOWSHEET NOTE
Monitor continues to be hand held, pt restless, states she cant lay like this, repostioned, monitor adjusted

## 2018-02-15 NOTE — PLAN OF CARE
Problem: Anxiety:  Goal: Level of anxiety will decrease  Level of anxiety will decrease  Outcome: Ongoing  restless    Problem: Aspiration - Risk of:  Goal: Absence of aspiration  Absence of aspiration  Outcome: Ongoing  stable    Problem: Tissue Perfusion - Uteroplacental, Altered:  Goal: Absence of abnormal fetal heart rate pattern  Absence of abnormal fetal heart rate pattern  Outcome: Ongoing  Moderate variability    Problem: Venous Thromboembolism - Risk of:  Goal: Will show no signs or symptoms of venous thromboembolism  Will show no signs or symptoms of venous thromboembolism  Outcome: Ongoing  stable    Problem: Falls - Risk of:  Goal: Will remain free from falls  Will remain free from falls  Outcome: Ongoing  No falls    Problem: Discharge Planning:  Goal: Discharged to appropriate level of care  Discharged to appropriate level of care  Outcome: Ongoing  Verbalizes understanding    Comments: Care plan reviewed with patient and her mother. Patient and mother verbalize understanding of the plan of care and contribute to goal setting.
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
## 2018-02-15 NOTE — FLOWSHEET NOTE
On bedpan to void, informed of maternal drug testing policy in place on all laboring patients.  Consent signed and urine sent.,then gbs collected and to lab, then external fetal monitor reapplied

## 2018-02-16 LAB — RPR: NONREACTIVE

## 2018-02-16 NOTE — FLOWSHEET NOTE
Mother and family friend assisted pt to wheelchair that was obtained from the lobby with family. Mother then wheeled pt off the unit to return home.

## 2018-02-16 NOTE — FLOWSHEET NOTE
Dr. Arcadio Babinski called to check in on patient. Informed him patient is planning to leave AMA. Dr. Arcadio Babinski wants notified when patient leaves.

## 2018-02-16 NOTE — PROGRESS NOTES
Took call from [facility]. She reports that pt is unwilling to stay for post partum care. She understands that she could bleed heavily and even die should the bleeding be uncontrolled. She is still unwilling to stay.    Gildardo Madera 2/15/2018 9:48 PM

## 2018-02-16 NOTE — FLOWSHEET NOTE
Pt states she might as well leave because we aren't doing anything for her pain and that the dilaudid isn't touching her pain. Educated patient that this RN just gave the dilaudid and to give it a couple minutes. Also educated patient on the importance of her staying in the hospital to be closely monitored due to just having a major surgical procedure. Discussed with patient that she received a lot of medications during her procedure and that there were complications such as bleeding, infection, and your surgical incision could come undone. Pt acknowledges these risks.

## 2018-02-16 NOTE — FLOWSHEET NOTE
Pt complaining of pain 10/10, pt also coughing and tensing up and squirming in bed. Reviewed with patient steps to take to help decrease the pain, such as supporting of the incision when moving and coughing.

## 2018-02-16 NOTE — FLOWSHEET NOTE
Dr. Magnolia Frankel called and updated on pt's pain level of 10/10 and requesting pain relief. Orders received.

## 2018-02-16 NOTE — CARE COORDINATION
DISCHARGE BARRIERS    18, 7:42 AM    Reason for Referral: adoption; adoption, maternal drug abuse urine positive for opiates and cocaine. Social History: Assessment completed briefly with mom, Vee Elizalde on 2/15/18. Vee Elizalde stated that she has worked with [facility] before in the past. Vee Elizalde stated that she is planning on leaving tomorrow even if she does have a . Vee Elizalde stated that she would call Jeffrey Alford at [facility] and let her know that she is more than likely delivering today. Vee Elizalde reported to RN using heroin at 7am today. Vee Elizalde is a poly substance abuse user and has been in/out of the hospital and emergency room. Vee Elizalde initially stated using heroin in . Community Resources: [facility]: none, planning adoption  Concerns or Barriers to Discharge: This is Mi's 10th pregnancy, 9th baby. Vee Elizalde has no children in her custody. Vee Elizalde has worked with [facility] [facility] for 2 other adoptions making this the third adoption. Vee Elizalde has also lost legal custody of other children in the past.   Teach Back Method used with mother regarding care plan and discharge plan. Mother verbalize understanding of the plan of care and contribute to goal setting. Discharge Plan: Baby to discharge home with adoptive parents. [facility] will be contacted for appropriate paperwork.      Electronically signed by JULIETTE Guardado, CARLW on 18 at 7:55 AM

## 2018-02-17 LAB — GROUP B STREP CULTURE: NORMAL

## 2018-02-20 ENCOUNTER — HOSPITAL ENCOUNTER (EMERGENCY)
Age: 33
Discharge: LEFT BEFORE DISCHARGE | End: 2018-02-20
Payer: MEDICAID

## 2018-02-20 VITALS
BODY MASS INDEX: 28.93 KG/M2 | RESPIRATION RATE: 17 BRPM | HEIGHT: 66 IN | OXYGEN SATURATION: 100 % | TEMPERATURE: 98.8 F | WEIGHT: 180 LBS | DIASTOLIC BLOOD PRESSURE: 99 MMHG | SYSTOLIC BLOOD PRESSURE: 139 MMHG | HEART RATE: 85 BPM

## 2018-02-20 PROCEDURE — 4500000002 HC ER NO CHARGE

## 2018-02-20 ASSESSMENT — PAIN DESCRIPTION - ORIENTATION: ORIENTATION: LOWER;RIGHT

## 2018-02-20 ASSESSMENT — PAIN DESCRIPTION - PAIN TYPE: TYPE: ACUTE PAIN

## 2018-02-20 ASSESSMENT — PAIN SCALES - GENERAL: PAINLEVEL_OUTOF10: 10

## 2018-02-20 ASSESSMENT — PAIN DESCRIPTION - LOCATION: LOCATION: ABDOMEN

## 2018-02-20 ASSESSMENT — PAIN DESCRIPTION - DESCRIPTORS: DESCRIPTORS: BURNING;SQUEEZING

## 2018-02-24 ENCOUNTER — HOSPITAL ENCOUNTER (EMERGENCY)
Age: 33
Discharge: LEFT BEFORE DISCHARGE | End: 2018-02-24
Payer: MEDICAID

## 2018-02-24 ENCOUNTER — APPOINTMENT (OUTPATIENT)
Dept: CT IMAGING | Age: 33
End: 2018-02-24
Payer: MEDICAID

## 2018-02-24 VITALS
HEART RATE: 64 BPM | SYSTOLIC BLOOD PRESSURE: 123 MMHG | WEIGHT: 170 LBS | BODY MASS INDEX: 27.32 KG/M2 | OXYGEN SATURATION: 100 % | DIASTOLIC BLOOD PRESSURE: 92 MMHG | RESPIRATION RATE: 16 BRPM | HEIGHT: 66 IN | TEMPERATURE: 97.4 F

## 2018-02-24 DIAGNOSIS — R10.9 ABDOMINAL PAIN, UNSPECIFIED ABDOMINAL LOCATION: Primary | ICD-10-CM

## 2018-02-24 LAB
AMPHETAMINE+METHAMPHETAMINE URINE SCREEN: NEGATIVE
ANION GAP SERPL CALCULATED.3IONS-SCNC: 12 MEQ/L (ref 8–16)
ANISOCYTOSIS: ABNORMAL
BACTERIA: ABNORMAL /HPF
BARBITURATE QUANTITATIVE URINE: NEGATIVE
BASOPHILS # BLD: 0.3 %
BASOPHILS ABSOLUTE: 0 THOU/MM3 (ref 0–0.1)
BENZODIAZEPINE QUANTITATIVE URINE: NEGATIVE
BILIRUBIN URINE: NEGATIVE
BLOOD, URINE: ABNORMAL
BUN BLDV-MCNC: 20 MG/DL (ref 7–22)
CALCIUM SERPL-MCNC: 9.6 MG/DL (ref 8.5–10.5)
CANNABINOID QUANTITATIVE URINE: NEGATIVE
CASTS 2: ABNORMAL /LPF
CASTS UA: ABNORMAL /LPF
CHARACTER, URINE: CLEAR
CHLORIDE BLD-SCNC: 101 MEQ/L (ref 98–111)
CO2: 30 MEQ/L (ref 23–33)
COCAINE METABOLITE QUANTITATIVE URINE: POSITIVE
COLOR: YELLOW
CREAT SERPL-MCNC: 0.5 MG/DL (ref 0.4–1.2)
CRYSTALS, UA: ABNORMAL
EOSINOPHIL # BLD: 4.5 %
EOSINOPHILS ABSOLUTE: 0.3 THOU/MM3 (ref 0–0.4)
EPITHELIAL CELLS, UA: ABNORMAL /HPF
GFR SERPL CREATININE-BSD FRML MDRD: > 90 ML/MIN/1.73M2
GLUCOSE BLD-MCNC: 102 MG/DL (ref 70–108)
GLUCOSE URINE: NEGATIVE MG/DL
HCT VFR BLD CALC: 36.7 % (ref 37–47)
HEMOGLOBIN: 12.1 GM/DL (ref 12–16)
KETONES, URINE: NEGATIVE
LACTIC ACID: 1 MMOL/L (ref 0.5–2.2)
LEUKOCYTE ESTERASE, URINE: NEGATIVE
LYMPHOCYTES # BLD: 31.3 %
LYMPHOCYTES ABSOLUTE: 2.2 THOU/MM3 (ref 1–4.8)
MCH RBC QN AUTO: 28.6 PG (ref 27–31)
MCHC RBC AUTO-ENTMCNC: 32.8 GM/DL (ref 33–37)
MCV RBC AUTO: 87.1 FL (ref 81–99)
MISCELLANEOUS 2: ABNORMAL
MONOCYTES # BLD: 5.2 %
MONOCYTES ABSOLUTE: 0.4 THOU/MM3 (ref 0.4–1.3)
NITRITE, URINE: NEGATIVE
NUCLEATED RED BLOOD CELLS: 0 /100 WBC
OPIATES, URINE: NEGATIVE
OSMOLALITY CALCULATION: 287.8 MOSMOL/KG (ref 275–300)
OXYCODONE: NEGATIVE
PDW BLD-RTO: 16.9 % (ref 11.5–14.5)
PH UA: 7
PHENCYCLIDINE QUANTITATIVE URINE: NEGATIVE
PLATELET # BLD: 362 THOU/MM3 (ref 130–400)
PMV BLD AUTO: 7.7 FL (ref 7.4–10.4)
POTASSIUM SERPL-SCNC: 4.2 MEQ/L (ref 3.5–5.2)
PROTEIN UA: NEGATIVE
RBC # BLD: 4.21 MILL/MM3 (ref 4.2–5.4)
RBC URINE: > 100 /HPF
RENAL EPITHELIAL, UA: ABNORMAL
SEG NEUTROPHILS: 58.7 %
SEGMENTED NEUTROPHILS ABSOLUTE COUNT: 4.1 THOU/MM3 (ref 1.8–7.7)
SODIUM BLD-SCNC: 143 MEQ/L (ref 135–145)
SPECIFIC GRAVITY, URINE: > 1.03 (ref 1–1.03)
UROBILINOGEN, URINE: 0.2 EU/DL
WBC # BLD: 6.9 THOU/MM3 (ref 4.8–10.8)
WBC UA: ABNORMAL /HPF
YEAST: ABNORMAL

## 2018-02-24 PROCEDURE — 6360000004 HC RX CONTRAST MEDICATION: Performed by: NURSE PRACTITIONER

## 2018-02-24 PROCEDURE — 81001 URINALYSIS AUTO W/SCOPE: CPT

## 2018-02-24 PROCEDURE — 74177 CT ABD & PELVIS W/CONTRAST: CPT

## 2018-02-24 PROCEDURE — 80307 DRUG TEST PRSMV CHEM ANLYZR: CPT

## 2018-02-24 PROCEDURE — 83605 ASSAY OF LACTIC ACID: CPT

## 2018-02-24 PROCEDURE — 80048 BASIC METABOLIC PNL TOTAL CA: CPT

## 2018-02-24 PROCEDURE — 85025 COMPLETE CBC W/AUTO DIFF WBC: CPT

## 2018-02-24 PROCEDURE — 99284 EMERGENCY DEPT VISIT MOD MDM: CPT

## 2018-02-24 PROCEDURE — 36415 COLL VENOUS BLD VENIPUNCTURE: CPT

## 2018-02-24 RX ADMIN — IOPAMIDOL 80 ML: 755 INJECTION, SOLUTION INTRAVENOUS at 01:54

## 2018-02-24 ASSESSMENT — PAIN DESCRIPTION - LOCATION: LOCATION: ABDOMEN

## 2018-02-24 ASSESSMENT — PAIN DESCRIPTION - PAIN TYPE: TYPE: ACUTE PAIN

## 2018-02-24 ASSESSMENT — ENCOUNTER SYMPTOMS
SORE THROAT: 0
ABDOMINAL PAIN: 1
BACK PAIN: 0
DIARRHEA: 0
COUGH: 0
EYE PAIN: 0
WHEEZING: 0
SHORTNESS OF BREATH: 0
NAUSEA: 0
RHINORRHEA: 0
VOMITING: 0
EYE DISCHARGE: 0

## 2018-02-24 ASSESSMENT — PAIN DESCRIPTION - DESCRIPTORS: DESCRIPTORS: CRAMPING

## 2018-02-24 ASSESSMENT — PAIN SCALES - GENERAL
PAINLEVEL_OUTOF10: 8
PAINLEVEL_OUTOF10: 8

## 2018-02-24 ASSESSMENT — PAIN DESCRIPTION - ORIENTATION: ORIENTATION: LOWER;RIGHT;MID

## 2018-02-24 NOTE — ED PROVIDER NOTES
[facility]  eMERGENCY dEPARTMENT eNCOUnter          CHIEF COMPLAINT       Chief Complaint   Patient presents with    Abdominal Pain     RIght lower abd pain thinks hernia may be ruptured    Suture / Staple Removal     from  site       Nurses Notes reviewed and I agree except as noted in the HPI. HISTORY OF PRESENT ILLNESS    Shayy Zayas is a 27 y.o. female who presents to the Emergency Department for the evaluation of right lower abdominal pain worsening post  8 days ago. The patient also complains of pain and itchiness to her  site. The patient denies chest pain, shortness of breath, or headache. The patient reports she delivered a healthy baby 8 days ago at 27 weeks gestation in an emergent . This was her tenth pregnancy and tenth live birth. She reports leaving from the recovery room hours post-op. She reports her baby is up for adoption. The patient also reports she was dismissed from her primary care provider's office and needs to have her staples removed from the  site. She reports her last opioid relapse was 2 days ago. She also noted a hernia to her RLQ. She has no other significant past medical history. The patient denies any other complaints or concerns at this time. All questions were addressed. Location/Symptom: Right lower quadrant  Timing/Onset: 8 days ago  Context/Setting: The pain began just post-op from a   Duration: Constant   Severity: Moderate     The HPI was provided by the patient. REVIEW OF SYSTEMS     Review of Systems   Constitutional: Negative for appetite change, chills, fatigue and fever. HENT: Negative for congestion, ear pain, rhinorrhea and sore throat. Eyes: Negative for pain, discharge and visual disturbance. Respiratory: Negative for cough, shortness of breath and wheezing. Cardiovascular: Negative for chest pain, palpitations and leg swelling.    Gastrointestinal: Positive for Pressure in her mother; High Cholesterol in her mother; Other in her father; Substance Abuse in her father; Thyroid Disease in her mother. SOCIAL HISTORY      reports that she has been smoking Cigarettes. She has a 40.00 pack-year smoking history. She has never used smokeless tobacco. She reports that she uses drugs, including Marijuana, IV, Cocaine, and Opiates . She reports that she does not drink alcohol. PHYSICAL EXAM     INITIAL VITALS:  height is 5' 6\" (1.676 m) and weight is 170 lb (77.1 kg). Her oral temperature is 97.4 °F (36.3 °C). Her blood pressure is 123/92 (abnormal) and her pulse is 64. Her respiration is 16 and oxygen saturation is 100%. Physical Exam   Constitutional: She is oriented to person, place, and time. She appears well-developed and well-nourished. HENT:   Head: Normocephalic and atraumatic. Right Ear: External ear normal.   Left Ear: External ear normal.   Eyes: Conjunctivae are normal. Right eye exhibits no discharge. Left eye exhibits no discharge. No scleral icterus. Neck: Normal range of motion. Neck supple. No JVD present. Cardiovascular: Normal rate, regular rhythm and normal heart sounds. Exam reveals no gallop and no friction rub. No murmur heard. Pulmonary/Chest: Effort normal and breath sounds normal. No respiratory distress. She has no decreased breath sounds. She has no wheezes. She has no rhonchi. She has no rales. Abdominal: Soft. She exhibits no distension. There is tenderness in the right lower quadrant. There is no rigidity, no rebound and no guarding. Musculoskeletal: Normal range of motion. She exhibits no edema. Neurological: She is alert and oriented to person, place, and time. She exhibits normal muscle tone. She displays no seizure activity. GCS eye subscore is 4. GCS verbal subscore is 5. GCS motor subscore is 6. Skin: Skin is warm and dry. No rash noted. She is not diaphoretic.    Healing wound noted with staples to the lower ANION GAP   GLOMERULAR FILTRATION RATE, ESTIMATED   OSMOLALITY       EMERGENCY DEPARTMENT COURSE:   Vitals:    Vitals:    18 0049 18 0051 18 0219   BP:  (!) 118/95 (!) 123/92   Pulse:  75 64   Resp:  16 16   Temp:  97.4 °F (36.3 °C)    TempSrc:  Oral    SpO2:  99% 100%   Weight: 170 lb (77.1 kg)     Height: 5' 6\" (1.676 m)         1:05 AM: The patient was seen and evaluated. Appropriate laboratory and radiology tests were ordered. At bedside, the patient was treated with iopamidol. 2:49 AM The patient decided to leave AMA. MDM:  The patient was seen and evaluated for RLQ abdominal pain post . Appropriate laboratory and radiology tests were ordered. At bedside, the patient was treated with iopamidol. Despite discussion with patient about reasons for and importance of admission and/or further testing, the patient refuses admission and/or any further testing and wants to leave against medical advice. The patient is alert and oriented times three, not altered or intoxicated in any fashion, and appears capable of making informed medical decisions. I explained plainly to the patient and any available family, the possible risks of leaving against medical advice, including worsening of medical condition that could result in their death, disability, or chronic vegetative state. They verbalize understanding to these risks and accept sole responsibility for leaving today. I did explain to them, that although they are leaving against medical advice today, they should not hesitate to return to the emergency room at any time should they change their mind, need re-evaluation or desire further care. CRITICAL CARE:   None     CONSULTS:  None    PROCEDURES:  None    FINAL IMPRESSION      1. Abdominal pain, unspecified abdominal location          DISPOSITION/PLAN   Discharged AMA    PATIENT REFERRED TO:  No follow-up provider specified.     DISCHARGE MEDICATIONS:  Discharge Medication List as

## 2018-02-25 ENCOUNTER — HOSPITAL ENCOUNTER (EMERGENCY)
Age: 33
Discharge: LEFT BEFORE DISCHARGE | End: 2018-02-26
Payer: MEDICAID

## 2018-02-25 VITALS
SYSTOLIC BLOOD PRESSURE: 131 MMHG | WEIGHT: 171 LBS | OXYGEN SATURATION: 95 % | DIASTOLIC BLOOD PRESSURE: 89 MMHG | HEART RATE: 73 BPM | TEMPERATURE: 98.5 F | BODY MASS INDEX: 27.48 KG/M2 | RESPIRATION RATE: 17 BRPM | HEIGHT: 66 IN

## 2018-02-25 PROCEDURE — 99283 EMERGENCY DEPT VISIT LOW MDM: CPT

## 2018-02-25 ASSESSMENT — PAIN DESCRIPTION - ORIENTATION: ORIENTATION: LOWER

## 2018-02-25 ASSESSMENT — PAIN DESCRIPTION - FREQUENCY: FREQUENCY: CONTINUOUS

## 2018-02-25 ASSESSMENT — PAIN SCALES - GENERAL: PAINLEVEL_OUTOF10: 8

## 2018-02-25 ASSESSMENT — PAIN DESCRIPTION - PAIN TYPE: TYPE: ACUTE PAIN

## 2018-02-25 ASSESSMENT — PAIN DESCRIPTION - LOCATION: LOCATION: ABDOMEN

## 2018-02-26 LAB
AMPHETAMINE+METHAMPHETAMINE URINE SCREEN: NEGATIVE
BACTERIA: ABNORMAL /HPF
BARBITURATE QUANTITATIVE URINE: NEGATIVE
BENZODIAZEPINE QUANTITATIVE URINE: NEGATIVE
BILIRUBIN URINE: NEGATIVE
BLOOD, URINE: ABNORMAL
CANNABINOID QUANTITATIVE URINE: NEGATIVE
CASTS 2: ABNORMAL /LPF
CASTS UA: ABNORMAL /LPF
CHARACTER, URINE: ABNORMAL
COCAINE METABOLITE QUANTITATIVE URINE: POSITIVE
COLOR: ABNORMAL
CRYSTALS, UA: ABNORMAL
EPITHELIAL CELLS, UA: ABNORMAL /HPF
GLUCOSE URINE: NEGATIVE MG/DL
KETONES, URINE: NEGATIVE
LEUKOCYTE ESTERASE, URINE: ABNORMAL
MISCELLANEOUS 2: ABNORMAL
NITRITE, URINE: NEGATIVE
OPIATES, URINE: NEGATIVE
OXYCODONE: NEGATIVE
PH UA: 6.5
PHENCYCLIDINE QUANTITATIVE URINE: NEGATIVE
PROTEIN UA: 30
RBC URINE: > 100 /HPF
RENAL EPITHELIAL, UA: ABNORMAL
SPECIFIC GRAVITY, URINE: 1.02 (ref 1–1.03)
UROBILINOGEN, URINE: 0.2 EU/DL
WBC UA: ABNORMAL /HPF
YEAST: ABNORMAL

## 2018-02-26 PROCEDURE — 81001 URINALYSIS AUTO W/SCOPE: CPT

## 2018-02-26 PROCEDURE — 80307 DRUG TEST PRSMV CHEM ANLYZR: CPT

## 2018-02-26 PROCEDURE — 87086 URINE CULTURE/COLONY COUNT: CPT

## 2018-02-27 LAB
ORGANISM: ABNORMAL
URINE CULTURE REFLEX: ABNORMAL

## 2019-06-22 ENCOUNTER — HOSPITAL ENCOUNTER (INPATIENT)
Age: 34
LOS: 2 days | Discharge: HOME | DRG: 773 | End: 2019-06-26
Attending: EMERGENCY MEDICINE | Admitting: PSYCHIATRY & NEUROLOGY
Payer: MEDICAID

## 2019-06-22 ENCOUNTER — APPOINTMENT (OUTPATIENT)
Dept: GENERAL RADIOLOGY | Age: 34
DRG: 773 | End: 2019-06-22
Payer: MEDICAID

## 2019-06-22 DIAGNOSIS — S29.012A STRAIN OF THORACIC BACK REGION: ICD-10-CM

## 2019-06-22 DIAGNOSIS — F11.93 OPIATE WITHDRAWAL (HCC): Primary | ICD-10-CM

## 2019-06-22 DIAGNOSIS — R07.81 RIB PAIN: ICD-10-CM

## 2019-06-22 LAB
ALBUMIN SERPL-MCNC: 4.2 G/DL (ref 3.5–5.1)
ALP BLD-CCNC: 141 U/L (ref 38–126)
ALT SERPL-CCNC: 33 U/L (ref 11–66)
ANION GAP SERPL CALCULATED.3IONS-SCNC: 16 MEQ/L (ref 8–16)
AST SERPL-CCNC: 26 U/L (ref 5–40)
BASOPHILS # BLD: 0.1 %
BASOPHILS ABSOLUTE: 0 THOU/MM3 (ref 0–0.1)
BILIRUB SERPL-MCNC: 0.9 MG/DL (ref 0.3–1.2)
BUN BLDV-MCNC: 7 MG/DL (ref 7–22)
CALCIUM SERPL-MCNC: 9.9 MG/DL (ref 8.5–10.5)
CHLORIDE BLD-SCNC: 99 MEQ/L (ref 98–111)
CO2: 22 MEQ/L (ref 23–33)
CREAT SERPL-MCNC: 0.5 MG/DL (ref 0.4–1.2)
EOSINOPHIL # BLD: 0.1 %
EOSINOPHILS ABSOLUTE: 0 THOU/MM3 (ref 0–0.4)
ERYTHROCYTE [DISTWIDTH] IN BLOOD BY AUTOMATED COUNT: 15.8 % (ref 11.5–14.5)
ERYTHROCYTE [DISTWIDTH] IN BLOOD BY AUTOMATED COUNT: 48.1 FL (ref 35–45)
GFR SERPL CREATININE-BSD FRML MDRD: > 90 ML/MIN/1.73M2
GLUCOSE BLD-MCNC: 185 MG/DL (ref 70–108)
HCG,BETA SUBUNIT,QUAL,SERUM: < 0.1 MIU/ML (ref 0–5)
HCT VFR BLD CALC: 42.3 % (ref 37–47)
HEMOGLOBIN: 14 GM/DL (ref 12–16)
HEPATITIS C ANTIBODY: POSITIVE
IMMATURE GRANS (ABS): 0.08 THOU/MM3 (ref 0–0.07)
IMMATURE GRANULOCYTES: 0.5 %
LYMPHOCYTES # BLD: 5.9 %
LYMPHOCYTES ABSOLUTE: 1 THOU/MM3 (ref 1–4.8)
MCH RBC QN AUTO: 28.3 PG (ref 26–33)
MCHC RBC AUTO-ENTMCNC: 33.1 GM/DL (ref 32.2–35.5)
MCV RBC AUTO: 85.6 FL (ref 81–99)
MONOCYTES # BLD: 3 %
MONOCYTES ABSOLUTE: 0.5 THOU/MM3 (ref 0.4–1.3)
NUCLEATED RED BLOOD CELLS: 0 /100 WBC
PLATELET # BLD: 204 THOU/MM3 (ref 130–400)
PMV BLD AUTO: 9.9 FL (ref 9.4–12.4)
POTASSIUM SERPL-SCNC: 3.7 MEQ/L (ref 3.5–5.2)
RBC # BLD: 4.94 MILL/MM3 (ref 4.2–5.4)
SEG NEUTROPHILS: 90.4 %
SEGMENTED NEUTROPHILS ABSOLUTE COUNT: 15.5 THOU/MM3 (ref 1.8–7.7)
SODIUM BLD-SCNC: 137 MEQ/L (ref 135–145)
T4 FREE: 1.27 NG/DL (ref 0.93–1.76)
TOTAL PROTEIN: 8.2 G/DL (ref 6.1–8)
TSH SERPL DL<=0.05 MIU/L-ACNC: 0.23 UIU/ML (ref 0.4–4.2)
WBC # BLD: 17.1 THOU/MM3 (ref 4.8–10.8)

## 2019-06-22 PROCEDURE — 84439 ASSAY OF FREE THYROXINE: CPT

## 2019-06-22 PROCEDURE — 80307 DRUG TEST PRSMV CHEM ANLYZR: CPT

## 2019-06-22 PROCEDURE — 6370000000 HC RX 637 (ALT 250 FOR IP): Performed by: EMERGENCY MEDICINE

## 2019-06-22 PROCEDURE — 6360000002 HC RX W HCPCS: Performed by: PSYCHIATRY & NEUROLOGY

## 2019-06-22 PROCEDURE — 99285 EMERGENCY DEPT VISIT HI MDM: CPT

## 2019-06-22 PROCEDURE — 71111 X-RAY EXAM RIBS/CHEST4/> VWS: CPT

## 2019-06-22 PROCEDURE — 6370000000 HC RX 637 (ALT 250 FOR IP): Performed by: PSYCHIATRY & NEUROLOGY

## 2019-06-22 PROCEDURE — 36415 COLL VENOUS BLD VENIPUNCTURE: CPT

## 2019-06-22 PROCEDURE — 72072 X-RAY EXAM THORAC SPINE 3VWS: CPT

## 2019-06-22 PROCEDURE — 80053 COMPREHEN METABOLIC PANEL: CPT

## 2019-06-22 PROCEDURE — 84702 CHORIONIC GONADOTROPIN TEST: CPT

## 2019-06-22 PROCEDURE — 86803 HEPATITIS C AB TEST: CPT

## 2019-06-22 PROCEDURE — 84443 ASSAY THYROID STIM HORMONE: CPT

## 2019-06-22 PROCEDURE — 85025 COMPLETE CBC W/AUTO DIFF WBC: CPT

## 2019-06-22 PROCEDURE — 2709999900 HC NON-CHARGEABLE SUPPLY

## 2019-06-22 PROCEDURE — G0378 HOSPITAL OBSERVATION PER HR: HCPCS

## 2019-06-22 PROCEDURE — 96372 THER/PROPH/DIAG INJ SC/IM: CPT

## 2019-06-22 PROCEDURE — 6360000002 HC RX W HCPCS

## 2019-06-22 RX ORDER — PROMETHAZINE HYDROCHLORIDE 25 MG/1
25 TABLET ORAL EVERY 6 HOURS PRN
Status: DISCONTINUED | OUTPATIENT
Start: 2019-06-22 | End: 2019-06-26 | Stop reason: HOSPADM

## 2019-06-22 RX ORDER — LIDOCAINE 4 G/G
1 PATCH TOPICAL ONCE
Status: COMPLETED | OUTPATIENT
Start: 2019-06-22 | End: 2019-06-22

## 2019-06-22 RX ORDER — ACETAMINOPHEN 500 MG
1000 TABLET ORAL ONCE
Status: COMPLETED | OUTPATIENT
Start: 2019-06-22 | End: 2019-06-22

## 2019-06-22 RX ORDER — DICYCLOMINE HCL 20 MG
20 TABLET ORAL EVERY 6 HOURS PRN
Status: DISCONTINUED | OUTPATIENT
Start: 2019-06-22 | End: 2019-06-26 | Stop reason: HOSPADM

## 2019-06-22 RX ORDER — HYDROXYZINE PAMOATE 50 MG/1
50 CAPSULE ORAL EVERY 8 HOURS PRN
Status: DISCONTINUED | OUTPATIENT
Start: 2019-06-22 | End: 2019-06-26 | Stop reason: HOSPADM

## 2019-06-22 RX ORDER — NICOTINE 21 MG/24HR
1 PATCH, TRANSDERMAL 24 HOURS TRANSDERMAL DAILY
Status: DISCONTINUED | OUTPATIENT
Start: 2019-06-22 | End: 2019-06-26 | Stop reason: HOSPADM

## 2019-06-22 RX ORDER — TRAZODONE HYDROCHLORIDE 50 MG/1
50 TABLET ORAL NIGHTLY PRN
Status: DISCONTINUED | OUTPATIENT
Start: 2019-06-22 | End: 2019-06-26 | Stop reason: HOSPADM

## 2019-06-22 RX ORDER — GABAPENTIN 300 MG/1
300 CAPSULE ORAL EVERY 8 HOURS PRN
Status: DISCONTINUED | OUTPATIENT
Start: 2019-06-22 | End: 2019-06-26 | Stop reason: HOSPADM

## 2019-06-22 RX ORDER — BUPRENORPHINE 2 MG/1
2 TABLET SUBLINGUAL PRN
Status: DISPENSED | OUTPATIENT
Start: 2019-06-22 | End: 2019-06-25

## 2019-06-22 RX ORDER — ORPHENADRINE CITRATE 30 MG/ML
60 INJECTION INTRAMUSCULAR; INTRAVENOUS ONCE
Status: DISCONTINUED | OUTPATIENT
Start: 2019-06-22 | End: 2019-06-22

## 2019-06-22 RX ORDER — GABAPENTIN 300 MG/1
600 CAPSULE ORAL ONCE
Status: COMPLETED | OUTPATIENT
Start: 2019-06-22 | End: 2019-06-22

## 2019-06-22 RX ORDER — ORPHENADRINE CITRATE 30 MG/ML
60 INJECTION INTRAMUSCULAR; INTRAVENOUS ONCE
Status: COMPLETED | OUTPATIENT
Start: 2019-06-22 | End: 2019-06-22

## 2019-06-22 RX ORDER — CLONIDINE HYDROCHLORIDE 0.1 MG/1
0.1 TABLET ORAL PRN
Status: DISCONTINUED | OUTPATIENT
Start: 2019-06-22 | End: 2019-06-26 | Stop reason: HOSPADM

## 2019-06-22 RX ORDER — ORPHENADRINE CITRATE 30 MG/ML
INJECTION INTRAMUSCULAR; INTRAVENOUS
Status: COMPLETED
Start: 2019-06-22 | End: 2019-06-22

## 2019-06-22 RX ADMIN — DICYCLOMINE HYDROCHLORIDE 20 MG: 20 TABLET ORAL at 20:19

## 2019-06-22 RX ADMIN — Medication 2 MG: at 20:36

## 2019-06-22 RX ADMIN — ACETAMINOPHEN 1000 MG: 500 TABLET ORAL at 04:19

## 2019-06-22 RX ADMIN — BUPRENORPHINE HCL 2 MG: 2 TABLET SUBLINGUAL at 14:00

## 2019-06-22 RX ADMIN — HYDROXYZINE PAMOATE 50 MG: 50 CAPSULE ORAL at 11:52

## 2019-06-22 RX ADMIN — ORPHENADRINE CITRATE 60 MG: 30 INJECTION INTRAMUSCULAR; INTRAVENOUS at 04:28

## 2019-06-22 RX ADMIN — PROMETHAZINE HYDROCHLORIDE 25 MG: 25 TABLET ORAL at 20:19

## 2019-06-22 RX ADMIN — TRAZODONE HYDROCHLORIDE 50 MG: 50 TABLET ORAL at 20:19

## 2019-06-22 RX ADMIN — GABAPENTIN 600 MG: 300 CAPSULE ORAL at 11:53

## 2019-06-22 RX ADMIN — DICYCLOMINE HYDROCHLORIDE 20 MG: 20 TABLET ORAL at 11:52

## 2019-06-22 RX ADMIN — GABAPENTIN 300 MG: 300 CAPSULE ORAL at 20:19

## 2019-06-22 RX ADMIN — BUPRENORPHINE HCL 2 MG: 2 TABLET SUBLINGUAL at 15:57

## 2019-06-22 RX ADMIN — BUPRENORPHINE HCL 2 MG: 2 TABLET SUBLINGUAL at 20:19

## 2019-06-22 RX ADMIN — CLONIDINE HYDROCHLORIDE 0.1 MG: 0.1 TABLET ORAL at 15:57

## 2019-06-22 RX ADMIN — PROMETHAZINE HYDROCHLORIDE 25 MG: 25 TABLET ORAL at 11:53

## 2019-06-22 RX ADMIN — HYDROXYZINE PAMOATE 50 MG: 50 CAPSULE ORAL at 20:19

## 2019-06-22 RX ADMIN — CLONIDINE HYDROCHLORIDE 0.1 MG: 0.1 TABLET ORAL at 12:45

## 2019-06-22 RX ADMIN — BUPRENORPHINE HCL 2 MG: 2 TABLET SUBLINGUAL at 11:52

## 2019-06-22 ASSESSMENT — ENCOUNTER SYMPTOMS
DIARRHEA: 1
COUGH: 1
CHEST TIGHTNESS: 1
BACK PAIN: 1
SHORTNESS OF BREATH: 1

## 2019-06-22 ASSESSMENT — PAIN DESCRIPTION - PROGRESSION
CLINICAL_PROGRESSION: NOT CHANGED
CLINICAL_PROGRESSION: GRADUALLY WORSENING

## 2019-06-22 ASSESSMENT — PAIN SCALES - GENERAL
PAINLEVEL_OUTOF10: 10
PAINLEVEL_OUTOF10: 8
PAINLEVEL_OUTOF10: 10
PAINLEVEL_OUTOF10: 8
PAINLEVEL_OUTOF10: 8

## 2019-06-22 ASSESSMENT — PAIN DESCRIPTION - PAIN TYPE
TYPE: ACUTE PAIN

## 2019-06-22 ASSESSMENT — PAIN DESCRIPTION - DESCRIPTORS
DESCRIPTORS: ACHING;SHARP
DESCRIPTORS: ACHING;SHARP

## 2019-06-22 ASSESSMENT — PAIN DESCRIPTION - FREQUENCY
FREQUENCY: CONTINUOUS
FREQUENCY: CONTINUOUS

## 2019-06-22 ASSESSMENT — PAIN DESCRIPTION - ORIENTATION
ORIENTATION: RIGHT;LEFT
ORIENTATION: LEFT

## 2019-06-22 ASSESSMENT — PAIN DESCRIPTION - LOCATION
LOCATION: GENERALIZED;RIB CAGE
LOCATION: RIB CAGE
LOCATION: RIB CAGE

## 2019-06-22 ASSESSMENT — PAIN DESCRIPTION - ONSET
ONSET: ON-GOING
ONSET: ON-GOING

## 2019-06-22 ASSESSMENT — PAIN DESCRIPTION - DIRECTION: RADIATING_TOWARDS: TO BACK

## 2019-06-22 ASSESSMENT — PAIN - FUNCTIONAL ASSESSMENT: PAIN_FUNCTIONAL_ASSESSMENT: ACTIVITIES ARE NOT PREVENTED

## 2019-06-22 NOTE — ED NOTES
Care assumed. Pt resting in cart with easy respirations. Updated on POC. Pt verbalizes understanding.       Guevara Hauser RN  06/22/19 4245

## 2019-06-22 NOTE — ED NOTES
Pt got out of bed and used a urinal on her own. When asked why she didn't walk to the bathroom, she states \"I hurt too fucking bad\". When I asked how she got the bedpan from the cupboard, she states \"It was hard! \"  Pt laying on cart with easy respirations.        Eusebio Way RN  06/22/19 7719

## 2019-06-22 NOTE — PROGRESS NOTES
Pt states she is having rib pain 10/10, heat pad and warms blankets applied. PRN medication given, pt states subutex wont be enough and doesn't know if she can do this because she does 3 gm of fentanyl daily.
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
## 2019-06-22 NOTE — ED PROVIDER NOTES
[facility] [[email or web address]]     eMERGENCY dEPARTMENT eNCOUnter         Pt Name: Enrike Limon  MRN: [id number]  [place] 1985  Date of evaluation: 6/22/2019  Provider: Sue Monge MD    CHIEF COMPLAINT       Chief Complaint   Patient presents with    Rib Pain       Nurses Notes reviewed and I agree except as noted in the HPI. HISTORY OF PRESENT ILLNESS    Enrike Limon is a 26 y.o. female who presents for evaluation of rib pain in thoracic spine pain. The patient reports that a day or 2 ago she had her back crack by friend. She having increasing pain now 10 on a scale of 0-10 throughout her rib cage and back. Says makes it hard to breathe and short of breath. She says she used heroin last 2 days ago and is going through withdrawal.  She states to me that she wants help with withdrawal.  Asked why she chose now since he has such a long history IV heroin use and she says she is tired of it. The patient has NSAID allergies and is having multiple symptoms related to the withdrawal.  No report of numbness or tingling. No loss of bowel or bladder problems or motor control. This HPI was provided by the patient. REVIEW OF SYSTEMS     Review of Systems   Unable to perform ROS: Acuity of condition   Respiratory: Positive for cough, chest tightness and shortness of breath. Gastrointestinal: Positive for diarrhea. Musculoskeletal: Positive for arthralgias, back pain and myalgias. PAST MEDICAL HISTORY    has a past medical history of Anemia, Anxiety and depression, Bipolar 1 disorder ([address] Constipation, Disc herniation, Gestational diabetes, Hepatitis C, Opioid abuse, Pap smear, as part of routine gynecological examination, Postpartum depression, Sciatic pain, Scoliosis, TMJ (temporomandibular joint disorder), and Trauma.     SURGICAL HISTORY      has a past surgical history that includes Mouth surgery; Dilation and curettage of uterus (12/2016); fracture surgery; EXCISION / BIOPSY SKIN LESION OF HEAD / NECK (Left, 10/5/2017); and  section (N/A, 2/15/2018). CURRENT MEDICATIONS       Previous Medications    GABAPENTIN PO    Take 800 mg by mouth 2 times daily       ALLERGIES     is allergic to ketorolac tromethamine; naproxen; nsaids; nubain [nalbuphine hcl]; and pcn [penicillins]. FAMILY HISTORY     indicated that her mother is alive. She indicated that her father is alive. She indicated that the status of her maternal grandmother is unknown.   family history includes Anxiety Disorder in her mother; Cancer in her maternal grandmother; Depression in her mother; Diabetes in her maternal grandmother and mother; High Blood Pressure in her mother; High Cholesterol in her mother; Other in her father; Substance Abuse in her father; Thyroid Disease in her mother. SOCIAL HISTORY      reports that she has been smoking cigarettes. She has a 40.00 pack-year smoking history. She has never used smokeless tobacco. She reports that she has current or past drug history. Drugs: Marijuana, IV, Cocaine, and Opiates . She reports that she does not drink alcohol. PHYSICAL EXAM       ED Triage Vitals [19 0354]   BP Temp Temp Source Pulse Resp SpO2 Height Weight   (!) 123/91 99.5 °F (37.5 °C) Oral 91 20 97 % -- 156 lb (70.8 kg)      Physical Exam   Constitutional: She is oriented to person, place, and time. She appears well-developed and well-nourished. She is uncooperative. Non-toxic appearance. She does not appear ill. She appears distressed. Nasal cannula in place. HENT:   Head: Normocephalic. Right Ear: External ear normal. No drainage. Left Ear: External ear normal. No drainage. Nose: Nose normal. No epistaxis. Mouth/Throat: Mucous membranes are not dry and not cyanotic. Eyes: Conjunctivae and EOM are normal. Right eye exhibits no discharge. Left eye exhibits no discharge. No scleral icterus. Neck: Trachea normal, normal range of motion and phonation normal. Neck supple. No tracheal deviation present. Cardiovascular: Normal rate, regular rhythm, normal heart sounds and intact distal pulses. Exam reveals no gallop and no friction rub. No murmur heard. Pulses:       Radial pulses are 2+ on the right side. Pulmonary/Chest: Effort normal and breath sounds normal. No stridor. No respiratory distress. She has no wheezes. She has no rales. She exhibits no tenderness. Abdominal: Soft. Bowel sounds are normal. She exhibits no distension and no pulsatile midline mass. There is no tenderness. There is no rebound and no guarding. Musculoskeletal: Normal range of motion. She exhibits no edema or tenderness (No calf pain or tenderness. No evidence of DVT). Neurological: She is alert and oriented to person, place, and time. She has normal strength. She displays no tremor. She displays no seizure activity. Coordination normal. GCS eye subscore is 4. GCS verbal subscore is 5. GCS motor subscore is 6. Skin: Skin is warm and dry. Bruising and ecchymosis noted. No rash (on exposed surfaced) noted. She is not diaphoretic. No cyanosis or erythema. No pallor. Numerous track marks visible on extremities. Psychiatric: She has a normal mood and affect. Her speech is normal and behavior is normal.   Nursing note and vitals reviewed. DIFFERENTIAL DIAGNOSIS:   Occult rib or thoracic spine fracture or injury. Rule out pulmonary injury or concern. Long-standing heroin abuser and allergies will make pain management difficult. DIAGNOSTIC RESULTS     RADIOLOGY: non-plain film images(s) such as CT, Ultrasound and MRI are read by the radiologist.    XR THORACIC SPINE (3 VIEWS)   Final Result    No acute findings. **This report has been created using voice recognition software. It may contain minor errors which are inherent in voice recognition technology. **      Final report electronically signed by Dr. Rama Galo on 6/22/2019 5:46 AM      XR RIBS BILATERAL (MIN 4 VIEWS) Final Result   1. Negative exam for obvious rib fracture. 2. Mild bibasilar atelectasis changes. Underlying infiltrate is not excluded. **This report has been created using voice recognition software. It may contain minor errors which are inherent in voice recognition technology. **      Final report electronically signed by Dr. Shamar Garay on 6/22/2019 5:45 AM          [x] Visualized and interpreted by me   [x] Radiologist's Wet Read Report Reviewed   [] Discussed with Radiologist.    EMERGENCY DEPARTMENT COURSE:   Vitals:    Vitals:    06/22/19 0354   BP: (!) 123/91   Pulse: 91   Resp: 20   Temp: 99.5 °F (37.5 °C)   TempSrc: Oral   SpO2: 97%   Weight: 156 lb (70.8 kg)     Controlled Substance Monitoring:    Acute and Chronic Pain Monitoring:   RX Monitoring 6/22/2019   Periodic Controlled Substance Monitoring No signs of potential drug abuse or diversion identified. Orders Placed This Encounter   Medications    acetaminophen (TYLENOL) tablet 1,000 mg    gabapentin (NEURONTIN) capsule 600 mg    lidocaine 4 % external patch 1 patch    DISCONTD: orphenadrine (NORFLEX) injection 60 mg    orphenadrine (NORFLEX) injection 60 mg    orphenadrine (NORFLEX) 30 MG/ML injection     Edward Howell: cabinet override       Patient was seen and evaluated in emergency department. History physical were completed. Diagnostic imaging studies are reviewed. Workup demonstrates a no evidence of fracture or dislocation or occult injury. Patient was treated with Tylenol gabapentin lidocaine external patch and Norflex parenterally. She was evaluated as well by behavioral access counselor who is consulted with inpatient psychiatry regarding admission for opiate use and withdrawal.  They have accepted her for admission to  and will have an available bed and staffing within the hour. FINAL IMPRESSION      1. Rib pain    2. Strain of thoracic back region    3.  Opiate withdrawal ([address] DISPOSITION/PLAN   DISPOSITION Decision To Admit 06/22/2019 06:01:42 AM    PATIENT REFERRED TO:  CARLOS Forbes - CNP  [facility], [address]  [address] [place] [address]        Dr. Arturo Alcocer M.D 6/22/19 6:25 AM            Arturo Alcocer MD  06/22/19 0167       Arturo Alcocer MD  06/22/19 6402

## 2019-06-23 LAB
AMPHETAMINE+METHAMPHETAMINE URINE SCREEN: POSITIVE
BARBITURATE QUANTITATIVE URINE: NEGATIVE
BENZODIAZEPINE QUANTITATIVE URINE: NEGATIVE
CANNABINOID QUANTITATIVE URINE: NEGATIVE
COCAINE METABOLITE QUANTITATIVE URINE: NEGATIVE
OPIATES, URINE: POSITIVE
OXYCODONE: NEGATIVE
PHENCYCLIDINE QUANTITATIVE URINE: NEGATIVE

## 2019-06-23 PROCEDURE — G0378 HOSPITAL OBSERVATION PER HR: HCPCS

## 2019-06-23 PROCEDURE — 6360000002 HC RX W HCPCS: Performed by: PSYCHIATRY & NEUROLOGY

## 2019-06-23 PROCEDURE — 2709999900 HC NON-CHARGEABLE SUPPLY

## 2019-06-23 PROCEDURE — 6370000000 HC RX 637 (ALT 250 FOR IP): Performed by: PSYCHIATRY & NEUROLOGY

## 2019-06-23 PROCEDURE — 90792 PSYCH DIAG EVAL W/MED SRVCS: CPT | Performed by: NURSE PRACTITIONER

## 2019-06-23 RX ADMIN — CLONIDINE HYDROCHLORIDE 0.1 MG: 0.1 TABLET ORAL at 17:03

## 2019-06-23 RX ADMIN — BUPRENORPHINE HCL 2 MG: 2 TABLET SUBLINGUAL at 05:38

## 2019-06-23 RX ADMIN — BUPRENORPHINE HCL 2 MG: 2 TABLET SUBLINGUAL at 15:18

## 2019-06-23 RX ADMIN — HYDROXYZINE PAMOATE 50 MG: 50 CAPSULE ORAL at 15:18

## 2019-06-23 RX ADMIN — GABAPENTIN 300 MG: 300 CAPSULE ORAL at 15:18

## 2019-06-23 RX ADMIN — DICYCLOMINE HYDROCHLORIDE 20 MG: 20 TABLET ORAL at 05:38

## 2019-06-23 RX ADMIN — PROMETHAZINE HYDROCHLORIDE 25 MG: 25 TABLET ORAL at 22:30

## 2019-06-23 RX ADMIN — PROMETHAZINE HYDROCHLORIDE 25 MG: 25 TABLET ORAL at 15:18

## 2019-06-23 RX ADMIN — TRAZODONE HYDROCHLORIDE 50 MG: 50 TABLET ORAL at 17:03

## 2019-06-23 RX ADMIN — CLONIDINE HYDROCHLORIDE 0.1 MG: 0.1 TABLET ORAL at 09:42

## 2019-06-23 RX ADMIN — GABAPENTIN 300 MG: 300 CAPSULE ORAL at 05:38

## 2019-06-23 RX ADMIN — HYDROXYZINE PAMOATE 50 MG: 50 CAPSULE ORAL at 05:38

## 2019-06-23 RX ADMIN — BUPRENORPHINE HCL 2 MG: 2 TABLET SUBLINGUAL at 09:42

## 2019-06-23 RX ADMIN — DICYCLOMINE HYDROCHLORIDE 20 MG: 20 TABLET ORAL at 22:30

## 2019-06-23 RX ADMIN — Medication 2 MG: at 22:30

## 2019-06-23 RX ADMIN — CLONIDINE HYDROCHLORIDE 0.1 MG: 0.1 TABLET ORAL at 05:38

## 2019-06-23 RX ADMIN — BUPRENORPHINE HCL 2 MG: 2 TABLET SUBLINGUAL at 17:03

## 2019-06-23 RX ADMIN — DICYCLOMINE HYDROCHLORIDE 20 MG: 20 TABLET ORAL at 15:18

## 2019-06-23 RX ADMIN — BUPRENORPHINE HCL 2 MG: 2 TABLET SUBLINGUAL at 22:28

## 2019-06-23 RX ADMIN — PROMETHAZINE HYDROCHLORIDE 25 MG: 25 TABLET ORAL at 05:38

## 2019-06-23 ASSESSMENT — PAIN DESCRIPTION - LOCATION
LOCATION: GENERALIZED;RIB CAGE
LOCATION: RIB CAGE

## 2019-06-23 ASSESSMENT — PAIN DESCRIPTION - PROGRESSION
CLINICAL_PROGRESSION: NOT CHANGED

## 2019-06-23 ASSESSMENT — PAIN DESCRIPTION - DESCRIPTORS
DESCRIPTORS: ACHING;SHARP

## 2019-06-23 ASSESSMENT — PAIN DESCRIPTION - ONSET
ONSET: ON-GOING

## 2019-06-23 ASSESSMENT — PAIN SCALES - GENERAL
PAINLEVEL_OUTOF10: 8
PAINLEVEL_OUTOF10: 8
PAINLEVEL_OUTOF10: 10
PAINLEVEL_OUTOF10: 10
PAINLEVEL_OUTOF10: 0
PAINLEVEL_OUTOF10: 8
PAINLEVEL_OUTOF10: 10
PAINLEVEL_OUTOF10: 8
PAINLEVEL_OUTOF10: 0

## 2019-06-23 ASSESSMENT — PAIN DESCRIPTION - ORIENTATION
ORIENTATION: LEFT

## 2019-06-23 ASSESSMENT — PAIN DESCRIPTION - PAIN TYPE
TYPE: ACUTE PAIN

## 2019-06-23 ASSESSMENT — PAIN - FUNCTIONAL ASSESSMENT
PAIN_FUNCTIONAL_ASSESSMENT: ACTIVITIES ARE NOT PREVENTED

## 2019-06-23 ASSESSMENT — PAIN DESCRIPTION - DIRECTION
RADIATING_TOWARDS: TO BACK
RADIATING_TOWARDS: TO BACK

## 2019-06-23 ASSESSMENT — PAIN DESCRIPTION - FREQUENCY
FREQUENCY: CONTINUOUS

## 2019-06-23 NOTE — H&P
Department of Psychiatry  Opioid Withdrawal and Linkage Assessment      CHIEF COMPLAINT:  Severe opioid withdrawal, unable to manage safely at home.      History obtained from:  patient, electronic medical record and family members.     The treatment situations and needs are appropriate for inpatient level as indicated by: Voluntary treatment at lower level is not easible, Lower level of care is not available in outpatient area and medically inappropriate for patient's condition.      HISTORY OF PRESENT ILLNESS:    Serg  is a 26year old female with severe opioid use disorder presented to the emergency department for severe opioid withdrawals unable to manage safely at home. Patient has a long history of severe opioid use disorder, causing significant stress in patient's life, characterized by :  - larger amount of opioid used by the patient and whenever possible, using substances for longer duration  - patient has made attempts, unsuccessfully, to stop or cut down the abuse of opioid, in the past  - significant time is spent, by the patient, to obtain the drugs, almost every day  - patient is craving for opioids  - opioid abuse has significantly affected patient's relationship, social life, interpersonal relationship  - patient's social and recreational life has been negatively affected, secondary to opioid abuse  - patient continues to abuse opioid, despite the knowledge that it is affecting her physical and psychological life  - patient has developed tolerance, as manifested by increased amount of opioid to achieve the desired effect of feeling high  - patient complains of having withdrawal symptoms, when not taking opioid and at times patient abuses opioid to prevent withdrawal symptoms.   Patient's last use of opioid was 3 days ago ,now going through withdrawal from Opioid, characterized by :  -Dysphoric and irritable mood,feels miserable  -Nausea  -Muscle aches allover the body  -Photophobia,sweating  -Abdominal cramps,diarrhea   -Yawning  -Insomnia        AOD Use History:  Reports addiction to Fentanyl  off and on for several years reports was using up to 3 grams daily whenever possible.         PSYCHIATRIC HISTORY:      Lifetime Psychiatric Review of Systems          Pebbles or Hypomania: denies      Panic Attacks: denies      Phobias: denies     Obsessions and Compulsions:denies     Body or Vocal Tics:  denies     Hallucinations:denies     Delusions: Denies     Past Medical History:    See ED note     Allergies:  Ketorolac, Naproxen, NSAIDS, Nubain, PCN     Social History:     BORN IN  [address] [place] [address]  MARITAL STATUS: Single   OCCUPATION: Unemployed  RESIDENCE: Lives in [address] [place] [address] with a friend     LEGAL HISTORY:   Reports to answer when asked about legal Hx     Family History:   Reports mother, father and sister are addicted to opiates.      PHYSICAL EXAM:  Labs reviewed drawn 6- reflect no critical abnormals. Noted Hep C pos. Which client has Hx of Hep C. Serum pregnancy indicates <0.1 Blood glucose is 168 nonfasting   Still attempting to obtain urine tox screen     Review of systems: :   Constitutional: Denies fever  Eyes Denies: blurry vision  ENT: + Rhinitis  Cardiovascular:  Denies palpitations   Respiratory: Denies: cough, shortness of breath, wheezing  GI: + nausea, vomiting, diarrhea  : Denies: frequency/urgency  Neuro: Denies: dizzy/vertigo, headache, numbness/tingling  Musculoskeletal: + Muscle aches. Denies: joint pain, joint swelling  Skin:  Denies: rash        Physical Exam:   Constitutional:  Well developed, no acute distress. HENT:   Head: Normocephalic and atraumatic. Ears: Normal external ear bilaterally  Eyes:  + pupils dilated equally bilaterally, no anisocoria or nystagmus. Skin: + Diaphoresis, + Piloerection  Neck: Normal range of motion. Neck supple. No JVD present.    Pulmonary: lungs clear to auscultation bilaterally without wheezing, rales, or rhonchi, no accessory muscle use. Musculoskeletal: +restless. Neurological: + tremor of outstretched hands. Cranial nerves II-XII in tact. Normal gait and station          Mental Status Exam  Level of consciousness:  Within normal limits  Appearance: Hospital attire, seated in chair, with good grooming and hygiene   Behavior/Motor: No abnormalities noted  Attitude toward examiner:  Cooperative, attentive, good eye contact  Speech:  spontaneous, normal rate, normal volume and well articulated  Mood: Slight anxious  Affect:  mood congruent  Thought processes:  linear, goal directed and coherent  Thought content:  denies homicidal ideation  Suicidal Ideation:  denies suicidal ideation  Delusions:  no evidence of delusions  Perceptual Disturbance:  denies any perceptual disturbance  Cognition:  Oriented to self, location, time, and situation  Memory: age appropriate  Insight & Judgement: Poor  Medication side effects:  denies         DSM-5 Diagnosis  Acute opioid withdrawals  Opioid use disorder severe dependence         Psychosocial and Contextual factors:  Financial  Occupational  Relationship  Legal   Living situation  Educational      Inpatient Detox recommended due to: Unable to manage withdrawal symptoms safely at home.     TREATMENT PLAN     Detox with: Subutex, based on COWS scores. as Rxed by Dr Татьяна Degroot   Patient was educated on the medications that were going to be used for detox. Risks vs benefits were discussed with the patient. Patient provided informed consent for these medications. Patient will work with social work and staff for [employer]:   Continue appropriate home meds.        Estimated length of stay:  2-5 days        GENERAL PATIENT/FAMILY EDUCATION  Patient will understand basic signs and symptoms, Patient will understand benefits/risks and potential side effects from proposed meds and Patient will understand their role in recovery. Family is active in patient's care.    Patient assets that may be helpful during treatment include: Intent to participate and engage in treatment, sufficient fund of knowledge and intellect to understand and utilize treatments.     Time Spent: 24 minutes      Physicians Signature:  Electronically signed by Mickie Stewart CNP 4-    I assessed this patient and reviewed the case and plan of care with Mickie Stewart CNP.   I have reviewed the above documentation and I agree with the findings and treatment plan as written  Electronically signed by Karie Joe. on 6/23/2019 at 11:30 AM

## 2019-06-23 NOTE — PLAN OF CARE
Problem: Safety:  Goal: Free from accidental physical injury  Description  Free from accidental physical injury  6/23/2019 1035 by Caprice Serrano RN  Outcome: Ongoing  Note:   Patient will remain free of physical injury throughout this shift. Problem: Daily Care:  Goal: Daily care needs are met  Description  Daily care needs are met  Outcome: Ongoing  Note:   Patients daily care needs are met throughout this shift. Problem: Skin Integrity:  Goal: Skin integrity will stabilize  Description  Skin integrity will stabilize  6/23/2019 1035 by Caprice Serrano RN  Outcome: Ongoing  Note:   Patient will maintain skin integrity throughout stay. Problem: Discharge Planning:  Goal: Patients continuum of care needs are met  Description  Patients continuum of care needs are met  6/23/2019 1035 by Caprice Serrano RN  Outcome: Ongoing  Note:   Patients continuum of care needs are met. Care plan reviewed with patient. Patient verbalizes understanding of the plan of care and contributes to goal setting.

## 2019-06-23 NOTE — PLAN OF CARE
Problem: Safety:  Goal: Free from accidental physical injury  Description  Free from accidental physical injury  6/22/2019 2350 by Matt Vásquez RN  Outcome: Ongoing  Note:   Patient free from injury this shift     Problem: Pain:  Goal: Pain level will decrease  Description  Pain level will decrease  Outcome: Ongoing     Problem: Skin Integrity:  Goal: Skin integrity will stabilize  Description  Skin integrity will stabilize  Outcome: Ongoing  Note:   Skin is assessed daily, no new skin issues noted, track marks scattered throughout      Problem: Discharge Planning:  Goal: Patients continuum of care needs are met  Description  Patients continuum of care needs are met  Outcome: Ongoing  Note:   Discharge needs are assessed daily     Problem: Pain:  Goal: Control of acute pain  Description  Control of acute pain  Note:   Patient voiced pain this shift at 10/10. Patient's pain goal is 0/10. RN continues to deliver PRN medication and attempts noninvasive methods such as repositioning, ice packs, and heating pad. Pain is re-assessed frequently. Bed alarm set after pain meds given. Care plan reviewed with patient. Patient verbalizes understanding of the plan of care and contribute to goal setting.

## 2019-06-24 PROCEDURE — 99225 PR SBSQ OBSERVATION CARE/DAY 25 MINUTES: CPT | Performed by: PSYCHIATRY & NEUROLOGY

## 2019-06-24 PROCEDURE — G0378 HOSPITAL OBSERVATION PER HR: HCPCS

## 2019-06-24 PROCEDURE — 6360000002 HC RX W HCPCS: Performed by: PSYCHIATRY & NEUROLOGY

## 2019-06-24 PROCEDURE — 6370000000 HC RX 637 (ALT 250 FOR IP): Performed by: PSYCHIATRY & NEUROLOGY

## 2019-06-24 RX ADMIN — BUPRENORPHINE HCL 2 MG: 2 TABLET SUBLINGUAL at 22:50

## 2019-06-24 RX ADMIN — GABAPENTIN 300 MG: 300 CAPSULE ORAL at 08:36

## 2019-06-24 RX ADMIN — HYDROXYZINE PAMOATE 50 MG: 50 CAPSULE ORAL at 08:36

## 2019-06-24 RX ADMIN — BUPRENORPHINE HCL 2 MG: 2 TABLET SUBLINGUAL at 14:55

## 2019-06-24 RX ADMIN — PROMETHAZINE HYDROCHLORIDE 25 MG: 25 TABLET ORAL at 08:36

## 2019-06-24 RX ADMIN — DICYCLOMINE HYDROCHLORIDE 20 MG: 20 TABLET ORAL at 08:36

## 2019-06-24 RX ADMIN — BUPRENORPHINE HCL 2 MG: 2 TABLET SUBLINGUAL at 08:35

## 2019-06-24 RX ADMIN — BUPRENORPHINE HCL 2 MG: 2 TABLET SUBLINGUAL at 03:43

## 2019-06-24 RX ADMIN — PROMETHAZINE HYDROCHLORIDE 25 MG: 25 TABLET ORAL at 17:45

## 2019-06-24 RX ADMIN — Medication 2 MG: at 22:50

## 2019-06-24 RX ADMIN — CLONIDINE HYDROCHLORIDE 0.1 MG: 0.1 TABLET ORAL at 08:35

## 2019-06-24 RX ADMIN — HYDROXYZINE PAMOATE 50 MG: 50 CAPSULE ORAL at 17:45

## 2019-06-24 RX ADMIN — HYDROXYZINE PAMOATE 50 MG: 50 CAPSULE ORAL at 00:13

## 2019-06-24 RX ADMIN — DICYCLOMINE HYDROCHLORIDE 20 MG: 20 TABLET ORAL at 17:45

## 2019-06-24 RX ADMIN — TRAZODONE HYDROCHLORIDE 50 MG: 50 TABLET ORAL at 22:50

## 2019-06-24 RX ADMIN — BUPRENORPHINE HCL 2 MG: 2 TABLET SUBLINGUAL at 17:45

## 2019-06-24 RX ADMIN — GABAPENTIN 300 MG: 300 CAPSULE ORAL at 00:13

## 2019-06-24 RX ADMIN — GABAPENTIN 300 MG: 300 CAPSULE ORAL at 17:44

## 2019-06-24 ASSESSMENT — PAIN DESCRIPTION - ONSET
ONSET: ON-GOING

## 2019-06-24 ASSESSMENT — PAIN DESCRIPTION - PROGRESSION
CLINICAL_PROGRESSION: NOT CHANGED

## 2019-06-24 ASSESSMENT — PAIN SCALES - GENERAL
PAINLEVEL_OUTOF10: 9
PAINLEVEL_OUTOF10: 10
PAINLEVEL_OUTOF10: 9
PAINLEVEL_OUTOF10: 10

## 2019-06-24 ASSESSMENT — PAIN DESCRIPTION - LOCATION
LOCATION: RIB CAGE

## 2019-06-24 ASSESSMENT — PAIN DESCRIPTION - FREQUENCY
FREQUENCY: CONTINUOUS

## 2019-06-24 ASSESSMENT — PAIN DESCRIPTION - DIRECTION
RADIATING_TOWARDS: SHOULDER
RADIATING_TOWARDS: TO BACK
RADIATING_TOWARDS: SHOULDER AND NECK

## 2019-06-24 ASSESSMENT — PAIN DESCRIPTION - ORIENTATION
ORIENTATION: LEFT

## 2019-06-24 ASSESSMENT — PAIN - FUNCTIONAL ASSESSMENT
PAIN_FUNCTIONAL_ASSESSMENT: ACTIVITIES ARE NOT PREVENTED

## 2019-06-24 ASSESSMENT — PAIN DESCRIPTION - DESCRIPTORS
DESCRIPTORS: ACHING;SHARP
DESCRIPTORS: ACHING
DESCRIPTORS: ACHING;SHARP

## 2019-06-24 ASSESSMENT — PAIN DESCRIPTION - PAIN TYPE
TYPE: ACUTE PAIN

## 2019-06-24 NOTE — PROGRESS NOTES
Interventions:   Continue current diet, Start ONS  Continued Inpatient Monitoring, Education Not Indicated, Coordination of Care(unable to keep patient awake)    Nutrition Evaluation:   · Evaluation: Goals set   · Goals: Patient will consume 75% or more of meals during LOS.      · Monitoring: Meal Intake, Supplement Intake, Diet Tolerance, Skin Integrity, Weight, Pertinent Labs, Monitor Bowel Function      Electronically signed by Mackey Skiff, RD, LD on 6/24/19 at 4:04 PM    Contact Number: [phone]

## 2019-06-24 NOTE — PLAN OF CARE
Problem: Safety:  Goal: Free from accidental physical injury  Description  Free from accidental physical injury  6/24/2019 0951 by Lambert Richardson RN  Outcome: Ongoing  Note:   Free from injury     Problem: Daily Care:  Goal: Daily care needs are met  Description  Daily care needs are met  6/24/2019 0951 by Lambert Richardson RN  Outcome: Ongoing  Note:   Care needs are being met. Problem: Skin Integrity:  Goal: Skin integrity will stabilize  Description  Skin integrity will stabilize  6/24/2019 0951 by Lambert Richardson RN  Outcome: Ongoing  Note:   See assessment     Problem: Discharge Planning:  Goal: Patients continuum of care needs are met  Description  Patients continuum of care needs are met  6/24/2019 0951 by Lambert Richardson RN  Outcome: Ongoing  Note:   Care needs are being met    Care plan reviewed with patient. Patient  verbalize understanding of the plan of care and contribute to goal setting.

## 2019-06-24 NOTE — PLAN OF CARE
Problem: Safety:  Goal: Free from accidental physical injury  Description  Free from accidental physical injury  6/24/2019 0204 by Solomon Spring RN  Outcome: Ongoing  Note:   Patient free from accidental injury. Bed in low locked position, call light and personal belongings within reach. Patient uses call light appropriately. Problem: Daily Care:  Goal: Daily care needs are met  Description  Daily care needs are met  6/24/2019 0204 by Solomon Spring RN  Outcome: Ongoing  Note:   Daily care needs are met per self and staff. Problem: Skin Integrity:  Goal: Skin integrity will stabilize  Description  Skin integrity will stabilize  6/24/2019 0204 by Solomon Spring RN  Outcome: Ongoing  Note:   No skin breakdown noted this shift. Patient encouraged to change position frequently. Problem: Discharge Planning:  Goal: Patients continuum of care needs are met  Description  Patients continuum of care needs are met  6/24/2019 0204 by Solomon Spring RN  Outcome: Ongoing  Note:   Patient plans to discharge home once medically stable. Care plan reviewed with patient. No questions or concerns at this time.

## 2019-06-24 NOTE — PROGRESS NOTES
Diagnosis    [facility]    Borderline personality disorder ([facility]    Bipolar  disorder, History    Hepatitis C    Neck pain on left side    Pregnant and not yet delivered in second trimester    Cutaneous abscess of neck    Bleeding    Opiate withdrawal (HCC)    Opioid use disorder, severe, dependence ([facility] [address] [facility]        PLAN  Treatment team continues to implement the following: Subutex based on COWS score  Monitor subjective and objective indicators of status of detoxification   Laboratory studies - reviewed and discussed with the patient. Address comorbid medical conditions as indicated: Hospitalist consulted  Discussed PRN medications available to address symptomatic relief to withdrawal.  Patient provided with education regarding withdrawal symptoms. Long-term medication assisted treatment alternatives and strategies for dependence were discussed with the patient. Nicotine replacement treatment as indicated.     Electronically signed by Lb Villalobos on 6/24/2019 at 5:31 PM time spent 25 minutes

## 2019-06-24 NOTE — PLAN OF CARE
Nutrition Problem: Inadequate oral intake  Intervention: Food and/or Nutrient Delivery: Continue current diet, Start ONS  Nutritional Goals: Patient will consume 75% or more of meals during LOS.     Problem: Nutrition  Goal: Optimal nutrition therapy  Outcome: Ongoing

## 2019-06-25 ENCOUNTER — APPOINTMENT (OUTPATIENT)
Dept: GENERAL RADIOLOGY | Age: 34
DRG: 773 | End: 2019-06-25
Payer: MEDICAID

## 2019-06-25 LAB
ERYTHROCYTE [DISTWIDTH] IN BLOOD BY AUTOMATED COUNT: 15.6 % (ref 11.5–14.5)
ERYTHROCYTE [DISTWIDTH] IN BLOOD BY AUTOMATED COUNT: 47.6 FL (ref 35–45)
HCT VFR BLD CALC: 39 % (ref 37–47)
HEMOGLOBIN: 12.9 GM/DL (ref 12–16)
MCH RBC QN AUTO: 28.2 PG (ref 26–33)
MCHC RBC AUTO-ENTMCNC: 33.1 GM/DL (ref 32.2–35.5)
MCV RBC AUTO: 85.2 FL (ref 81–99)
PLATELET # BLD: 228 THOU/MM3 (ref 130–400)
PMV BLD AUTO: 9.6 FL (ref 9.4–12.4)
PROCALCITONIN: 0.16 NG/ML (ref 0.01–0.09)
RBC # BLD: 4.58 MILL/MM3 (ref 4.2–5.4)
WBC # BLD: 15.4 THOU/MM3 (ref 4.8–10.8)

## 2019-06-25 PROCEDURE — 85027 COMPLETE CBC AUTOMATED: CPT

## 2019-06-25 PROCEDURE — 71046 X-RAY EXAM CHEST 2 VIEWS: CPT

## 2019-06-25 PROCEDURE — 6370000000 HC RX 637 (ALT 250 FOR IP): Performed by: NURSE PRACTITIONER

## 2019-06-25 PROCEDURE — 99224 PR SBSQ OBSERVATION CARE/DAY 15 MINUTES: CPT | Performed by: PSYCHIATRY & NEUROLOGY

## 2019-06-25 PROCEDURE — G0378 HOSPITAL OBSERVATION PER HR: HCPCS

## 2019-06-25 PROCEDURE — 1200000003 HC TELEMETRY R&B

## 2019-06-25 PROCEDURE — 6370000000 HC RX 637 (ALT 250 FOR IP): Performed by: PSYCHIATRY & NEUROLOGY

## 2019-06-25 PROCEDURE — 36415 COLL VENOUS BLD VENIPUNCTURE: CPT

## 2019-06-25 PROCEDURE — 2709999900 HC NON-CHARGEABLE SUPPLY

## 2019-06-25 PROCEDURE — 6360000002 HC RX W HCPCS: Performed by: PSYCHIATRY & NEUROLOGY

## 2019-06-25 PROCEDURE — 99254 IP/OBS CNSLTJ NEW/EST MOD 60: CPT | Performed by: NURSE PRACTITIONER

## 2019-06-25 PROCEDURE — 81001 URINALYSIS AUTO W/SCOPE: CPT

## 2019-06-25 PROCEDURE — 84145 PROCALCITONIN (PCT): CPT

## 2019-06-25 RX ORDER — ACETAMINOPHEN 325 MG/1
650 TABLET ORAL EVERY 4 HOURS PRN
Status: DISCONTINUED | OUTPATIENT
Start: 2019-06-25 | End: 2019-06-26 | Stop reason: HOSPADM

## 2019-06-25 RX ORDER — BUPRENORPHINE 2 MG/1
2 TABLET SUBLINGUAL PRN
Status: DISCONTINUED | OUTPATIENT
Start: 2019-06-25 | End: 2019-06-25

## 2019-06-25 RX ORDER — LEVOFLOXACIN 500 MG/1
500 TABLET, FILM COATED ORAL DAILY
Status: DISCONTINUED | OUTPATIENT
Start: 2019-06-25 | End: 2019-06-26 | Stop reason: HOSPADM

## 2019-06-25 RX ORDER — BUPRENORPHINE 2 MG/1
2 TABLET SUBLINGUAL PRN
Status: DISCONTINUED | OUTPATIENT
Start: 2019-06-25 | End: 2019-06-26

## 2019-06-25 RX ADMIN — GABAPENTIN 300 MG: 300 CAPSULE ORAL at 15:55

## 2019-06-25 RX ADMIN — DICYCLOMINE HYDROCHLORIDE 20 MG: 20 TABLET ORAL at 15:55

## 2019-06-25 RX ADMIN — HYDROXYZINE PAMOATE 50 MG: 50 CAPSULE ORAL at 15:55

## 2019-06-25 RX ADMIN — BUPRENORPHINE HCL 2 MG: 2 TABLET SUBLINGUAL at 09:21

## 2019-06-25 RX ADMIN — LEVOFLOXACIN 500 MG: 500 TABLET, FILM COATED ORAL at 15:55

## 2019-06-25 RX ADMIN — DICYCLOMINE HYDROCHLORIDE 20 MG: 20 TABLET ORAL at 09:21

## 2019-06-25 RX ADMIN — HYDROXYZINE PAMOATE 50 MG: 50 CAPSULE ORAL at 02:44

## 2019-06-25 RX ADMIN — ACETAMINOPHEN 650 MG: 325 TABLET ORAL at 15:55

## 2019-06-25 RX ADMIN — CLONIDINE HYDROCHLORIDE 0.1 MG: 0.1 TABLET ORAL at 15:56

## 2019-06-25 RX ADMIN — BUPRENORPHINE HCL 2 MG: 2 TABLET SUBLINGUAL at 02:44

## 2019-06-25 RX ADMIN — DICYCLOMINE HYDROCHLORIDE 20 MG: 20 TABLET ORAL at 02:44

## 2019-06-25 RX ADMIN — PROMETHAZINE HYDROCHLORIDE 25 MG: 25 TABLET ORAL at 15:55

## 2019-06-25 RX ADMIN — ACETAMINOPHEN 650 MG: 325 TABLET ORAL at 21:11

## 2019-06-25 RX ADMIN — Medication 2 MG: at 21:06

## 2019-06-25 RX ADMIN — TRAZODONE HYDROCHLORIDE 50 MG: 50 TABLET ORAL at 21:07

## 2019-06-25 RX ADMIN — BUPRENORPHINE HCL 2 MG: 2 TABLET SUBLINGUAL at 15:55

## 2019-06-25 RX ADMIN — PROMETHAZINE HYDROCHLORIDE 25 MG: 25 TABLET ORAL at 02:44

## 2019-06-25 RX ADMIN — PROMETHAZINE HYDROCHLORIDE 25 MG: 25 TABLET ORAL at 09:21

## 2019-06-25 RX ADMIN — BUPRENORPHINE HCL 2 MG: 2 TABLET SUBLINGUAL at 21:07

## 2019-06-25 RX ADMIN — GABAPENTIN 300 MG: 300 CAPSULE ORAL at 02:44

## 2019-06-25 ASSESSMENT — PAIN - FUNCTIONAL ASSESSMENT
PAIN_FUNCTIONAL_ASSESSMENT: PREVENTS OR INTERFERES SOME ACTIVE ACTIVITIES AND ADLS
PAIN_FUNCTIONAL_ASSESSMENT: ACTIVITIES ARE NOT PREVENTED

## 2019-06-25 ASSESSMENT — PAIN DESCRIPTION - LOCATION
LOCATION: RIB CAGE
LOCATION: GENERALIZED

## 2019-06-25 ASSESSMENT — PAIN DESCRIPTION - PROGRESSION
CLINICAL_PROGRESSION: NOT CHANGED
CLINICAL_PROGRESSION: NOT CHANGED

## 2019-06-25 ASSESSMENT — PAIN SCALES - GENERAL
PAINLEVEL_OUTOF10: 0
PAINLEVEL_OUTOF10: 10

## 2019-06-25 ASSESSMENT — PAIN DESCRIPTION - FREQUENCY
FREQUENCY: CONTINUOUS
FREQUENCY: CONTINUOUS

## 2019-06-25 ASSESSMENT — PAIN DESCRIPTION - DESCRIPTORS
DESCRIPTORS: ACHING;DISCOMFORT;SORE
DESCRIPTORS: ACHING

## 2019-06-25 ASSESSMENT — PAIN DESCRIPTION - ONSET
ONSET: ON-GOING
ONSET: ON-GOING

## 2019-06-25 ASSESSMENT — PAIN DESCRIPTION - ORIENTATION
ORIENTATION: RIGHT
ORIENTATION: RIGHT;LEFT

## 2019-06-25 ASSESSMENT — PAIN DESCRIPTION - PAIN TYPE
TYPE: ACUTE PAIN
TYPE: ACUTE PAIN

## 2019-06-25 NOTE — PLAN OF CARE
Problem: Safety:  Goal: Free from accidental physical injury  Description  Free from accidental physical injury  6/25/2019 0118 by Kortney Abraham RN  Outcome: Ongoing  Note:   Patient free from accidental injury. Bed in low locked position, call light and personal belongings within reach. Patient uses call light appropriately. Problem: Daily Care:  Goal: Daily care needs are met  Description  Daily care needs are met  6/25/2019 0118 by Kortney Abraham RN  Outcome: Ongoing  Note:   Daily care needs are met per self and staff. Problem: Skin Integrity:  Goal: Skin integrity will stabilize  Description  Skin integrity will stabilize  6/25/2019 0118 by Kortney Abraham RN  Outcome: Ongoing  Note:   No skin breakdown noted this shift. Patient encouraged to change position frequently. Problem: Discharge Planning:  Goal: Patients continuum of care needs are met  Description  Patients continuum of care needs are met  6/25/2019 0118 by Kortney Abraham RN  Outcome: Ongoing  Note:   Patient plans to discharge home once medically stable. Care plan reviewed with patient. No questions or concerns at this time.

## 2019-06-25 NOTE — CONSULTS
ER  2 mg Oral Nightly    nicotine  1 patch Transdermal Daily     Continuous Infusions:  PRN Meds:.[] buprenorphine **AND** cloNIDine, dicyclomine, hydrOXYzine, promethazine, traZODone, gabapentin    Allergies:  Ketorolac tromethamine; Naproxen; Nsaids; Nubain [nalbuphine hcl]; and Pcn [penicillins]    Social History:    reports that she has been smoking cigarettes. She has a 40.00 pack-year smoking history. She has never used smokeless tobacco. She reports that she has current or past drug history. Drugs: Marijuana, IV, Cocaine, and Opiates . She reports that she does not drink alcohol.     Family History:       Problem Relation Age of Onset    High Blood Pressure Mother     Anxiety Disorder Mother     Thyroid Disease Mother     Diabetes Mother     Depression Mother     High Cholesterol Mother     Other Father     Substance Abuse Father     Cancer Maternal Grandmother     Diabetes Maternal Grandmother          Review of Systems:  Constitutional: ROS: positive for  - fever  Head: no headache, no head injury, no migraine   Eyes ROS: denies blurred/double vision  Ears ROS: no hearing difficulty, no tinnitus  Mouth and Throat ROS: no ulceration, dysphagia, dental caries  Psychological ROS: no depression, no anxiety, no panic attacks, denies suicide/homicide ideation  Endocrine ROS: denies polyuria, polydypsia, no heat or cold intolerance  Respiratory ROS: positive for - cough, pleuritic pain and shortness of breath  Cardiovascular ROS: no chest pain or dyspnea on exertion  Gastrointestinal ROS: no abdominal pain, change in bowel habits, or black or bloody stools  Genito-Urinary ROS: denies dysuria, frequency, urgency; denies hematuria  Musculoskeletal ROS: negative  Neurological ROS: no syncope, no seizures, no numbness or tingling of hands, no numbness or tingling of feet, no paresis  Dermatology: no skin rash, no eczema  Endocrine: no polyuria, polydypsia, no heat/cold intolerance  Hematology: denies bruising easily, denies bleeding problems, denies clotting disorders    Physical Exam:    Vitals:  Patient Vitals for the past 24 hrs:   BP Temp Temp src Pulse Resp SpO2 Height   06/25/19 1141 98/69 100.6 °F (38.1 °C) Oral 103 18 94 % --   06/25/19 0914 (!) 109/59 98.7 °F (37.1 °C) Oral 96 20 95 % --   06/25/19 0700 -- 98.9 °F (37.2 °C) Oral -- -- -- --   06/25/19 0230 116/81 100.2 °F (37.9 °C) Oral 100 16 97 % --   06/24/19 2230 114/66 100.7 °F (38.2 °C) Oral 99 16 98 % --   06/24/19 1555 -- -- -- -- -- -- 5' 6\" (1.676 m)   06/24/19 1534 94/61 99 °F (37.2 °C) Oral 93 16 94 % --   06/24/19 1214 (!) 87/56 101.3 °F (38.5 °C) Oral 97 15 93 % --     Weight: Weight: 181 lb 9.6 oz (82.4 kg)     24 hour intake/output:    Intake/Output Summary (Last 24 hours) at 6/25/2019 1209  Last data filed at 6/25/2019 1141  Gross per 24 hour   Intake 1150 ml   Output 250 ml   Net 900 ml     Note: she is lying on her left side and refuses to sit up or lay supine to assess    General appearance - oriented to person, place, and time and anxious; appears stated age  HEENT-atraumatic, normocephalic; conjunctiva pink; sclera anicteric; oral mucosa pink and moist; trachea midline; neck supple; no carotid bruit auscultated; no JVD  Respiratory - decreased air entry noted throughout however refuses to take zaki breaths  Cardiovascular - normal rate and regular rhythm  Gastrointestinal - soft, nontender  Genitourinary- deferred  Obese: Yes  Neurological - alert and oriented to person, place, time; cranial nerves 2-12 grossly intact; speech is clear   Musculoskeletal - no joint tenderness, deformity or swelling, movement of extremities x 4 intact; gait steady; no lower extremity edema; pedal and posterior tibialis pulses 2+  Integumentary - pink-perico, warm, dry      Review of Labs and Diagnostic Testing:    CBC:   Recent Labs     06/22/19  1241 06/25/19  1144   WBC 17.1* 15.4*   HGB 14.0 12.9    228     BMP:    Recent Labs     06/22/19  1241    K 3.7   CL 99   CO2 22*   BUN 7   CREATININE 0.5   GLUCOSE 185*     Calcium:  Recent Labs     06/22/19  1241   CALCIUM 9.9     CXR:   Xr Ribs Bilateral (min 4 Views)  FINDINGS: The heart is normal in configuration. There is bibasilar atelectatic changes. There is no venous congestion or obvious pneumothorax. Dedicated images of the right and left ribs are negative for acute fracture changes abdominal soft tissues are grossly normal.     1. Negative exam for obvious rib fracture. 2. Mild bibasilar atelectasis changes. Underlying infiltrate is not excluded. Xr Thoracic Spine (3 Views)  FINDINGS: The thoracic vertebral bodies are normally aligned. There are no compression fractures. The endplates are within appropriate limits. No paraspinal abnormality is present. The posterior ribs are within appropriate limits. No acute findings. EKG:    Problem List:    Patient Active Problem List   Diagnosis    Narcotic withdrawal ([address]    Borderline personality disorder ([address]    Bipolar  disorder, History    Hepatitis C    Neck pain on left side    Pregnant and not yet delivered in second trimester    Cutaneous abscess of neck    Bleeding    Opiate withdrawal ([address]    Opioid use disorder, severe, dependence ([address]         Assessment/Plan:  1. Sepsis with leukocytosis, fever, tachycardia (not POA)--WBC's improving; T max 101. 3 past 24 hours; check U/A and CXR; add Tylenol PRN    Thank you Dr. Hailey Mauricio for allowing me to participate in this patient's care.     Gurjit Tucker, CNP

## 2019-06-25 NOTE — PROGRESS NOTES
Department of Psychiatry  Progress Note     Chief Complaint: Admitted due to severe Opioid dependence and withdrawals     Met with the patient along with social work. Chart reviewed. SUBJECTIVE:    Patient continues to report withdrawal symptoms as: Muscle cramps, Abdominal pain, sweating and restlessness  Reports good appetite and sleeping well. Taking meds, tolerating well without side effects.     Denies suicidal or homicidal ideation, plan or intent    OBJECTIVE      Medications  Current Facility-Administered Medications: buprenorphine (SUBUTEX) SL tablet 2 mg, 2 mg, Sublingual, PRN **AND** cloNIDine (CATAPRES) tablet 0.1 mg, 0.1 mg, Oral, PRN  melatonin ER tablet 2 mg, 2 mg, Oral, Nightly  dicyclomine (BENTYL) tablet 20 mg, 20 mg, Oral, Q6H PRN  hydrOXYzine (VISTARIL) capsule 50 mg, 50 mg, Oral, Q8H PRN  promethazine (PHENERGAN) tablet 25 mg, 25 mg, Oral, Q6H PRN  traZODone (DESYREL) tablet 50 mg, 50 mg, Oral, Nightly PRN  gabapentin (NEURONTIN) capsule 300 mg, 300 mg, Oral, Q8H PRN  nicotine (NICODERM CQ) 21 MG/24HR 1 patch, 1 patch, Transdermal, Daily     Physical  BP (!) 109/59   Pulse 96   Temp 98.7 °F (37.1 °C) (Oral)   Resp 20   Ht 5' 6\" (1.676 m)   Wt 181 lb 9.6 oz (82.4 kg)   LMP 06/21/2019   SpO2 95%   BMI 29.31 kg/m²     Mental Status Examination:    Level of consciousness:  Within normal limits  Appearance: good grooming  Behavior/Motor: No abnormalities noted  Attitude toward examiner:  cooperative  Speech:  Spontaneous, normal rate and volume  Mood:  Anxious  Affect:  Full range  Thought processes:  Linear coherent  Thought content: denies suicidal or homicidal ideations, denies hallucinations or delusions, does not appear to be responding to internal stimuli   Memory: age appropriate  Insight & Judgement: Good  Medication side effects:  denies       ASSESSMENT    Acute opioid withdrawals   Opioid use disorder severe Dependence    Patient Active Problem List   Diagnosis    Narcotic withdrawal ([facility] [address]    Borderline personality disorder ([facility] [address]    Bipolar  disorder, History    Hepatitis C    Neck pain on left side    Pregnant and not yet delivered in second trimester    Cutaneous abscess of neck    Bleeding    Opiate withdrawal (HCC)    Opioid use disorder, severe, dependence ([facility] [address]        PLAN  Treatment team continues to implement the following: Subutex based on COWS score  Monitor subjective and objective indicators of status of detoxification   Laboratory studies - reviewed and discussed with the patient. Address comorbid medical conditions as indicated: Hospitalist consulted  Discussed PRN medications available to address symptomatic relief to withdrawal.  Patient provided with education regarding withdrawal symptoms. Long-term medication assisted treatment alternatives and strategies for dependence were discussed with the patient. Nicotine replacement treatment as indicated.     Electronically signed by Sheryle Cheng on 6/25/2019 at 9:27 AM time spent 15 minutes

## 2019-06-26 VITALS
SYSTOLIC BLOOD PRESSURE: 124 MMHG | RESPIRATION RATE: 20 BRPM | WEIGHT: 181.6 LBS | BODY MASS INDEX: 29.18 KG/M2 | DIASTOLIC BLOOD PRESSURE: 77 MMHG | HEART RATE: 115 BPM | TEMPERATURE: 99.4 F | OXYGEN SATURATION: 93 % | HEIGHT: 66 IN

## 2019-06-26 LAB
BACTERIA: ABNORMAL /HPF
BILIRUBIN URINE: ABNORMAL
BLOOD, URINE: NEGATIVE
CASTS 2: ABNORMAL /LPF
CASTS UA: ABNORMAL /LPF
CHARACTER, URINE: CLEAR
COLOR: ABNORMAL
CRYSTALS, UA: ABNORMAL
EPITHELIAL CELLS, UA: ABNORMAL /HPF
GLUCOSE URINE: NEGATIVE MG/DL
ICTOTEST: NEGATIVE
KETONES, URINE: ABNORMAL
LEUKOCYTE ESTERASE, URINE: NEGATIVE
MISCELLANEOUS 2: ABNORMAL
NITRITE, URINE: NEGATIVE
PH UA: 6 (ref 5–9)
PROTEIN UA: 30
RBC URINE: ABNORMAL /HPF
RENAL EPITHELIAL, UA: ABNORMAL
SPECIFIC GRAVITY, URINE: 1.03 (ref 1–1.03)
UROBILINOGEN, URINE: 4 EU/DL (ref 0–1)
WBC UA: ABNORMAL /HPF
YEAST: ABNORMAL

## 2019-06-26 PROCEDURE — 99239 HOSP IP/OBS DSCHRG MGMT >30: CPT | Performed by: PSYCHIATRY & NEUROLOGY

## 2019-06-26 PROCEDURE — 6370000000 HC RX 637 (ALT 250 FOR IP): Performed by: PSYCHIATRY & NEUROLOGY

## 2019-06-26 PROCEDURE — 2709999900 HC NON-CHARGEABLE SUPPLY

## 2019-06-26 PROCEDURE — 6370000000 HC RX 637 (ALT 250 FOR IP): Performed by: NURSE PRACTITIONER

## 2019-06-26 PROCEDURE — 6370000000 HC RX 637 (ALT 250 FOR IP): Performed by: INTERNAL MEDICINE

## 2019-06-26 PROCEDURE — 6360000002 HC RX W HCPCS: Performed by: PSYCHIATRY & NEUROLOGY

## 2019-06-26 PROCEDURE — 99232 SBSQ HOSP IP/OBS MODERATE 35: CPT | Performed by: INTERNAL MEDICINE

## 2019-06-26 RX ORDER — ACETAMINOPHEN 325 MG/1
650 TABLET ORAL 3 TIMES DAILY
Status: DISCONTINUED | OUTPATIENT
Start: 2019-06-26 | End: 2019-06-26 | Stop reason: HOSPADM

## 2019-06-26 RX ORDER — BUPRENORPHINE AND NALOXONE 2; .5 MG/1; MG/1
1 FILM, SOLUBLE BUCCAL; SUBLINGUAL DAILY
Status: DISCONTINUED | OUTPATIENT
Start: 2019-06-27 | End: 2019-06-26 | Stop reason: HOSPADM

## 2019-06-26 RX ORDER — LEVOFLOXACIN 500 MG/1
500 TABLET, FILM COATED ORAL DAILY
Qty: 5 TABLET | Refills: 0 | Status: SHIPPED | OUTPATIENT
Start: 2019-06-27 | End: 2019-07-02

## 2019-06-26 RX ORDER — BUPRENORPHINE AND NALOXONE 2; .5 MG/1; MG/1
1 FILM, SOLUBLE BUCCAL; SUBLINGUAL DAILY
Qty: 3 FILM | Refills: 0 | Status: SHIPPED | OUTPATIENT
Start: 2019-06-27 | End: 2019-06-30

## 2019-06-26 RX ADMIN — DICYCLOMINE HYDROCHLORIDE 20 MG: 20 TABLET ORAL at 02:37

## 2019-06-26 RX ADMIN — PROMETHAZINE HYDROCHLORIDE 25 MG: 25 TABLET ORAL at 02:37

## 2019-06-26 RX ADMIN — DICYCLOMINE HYDROCHLORIDE 20 MG: 20 TABLET ORAL at 10:19

## 2019-06-26 RX ADMIN — CLONIDINE HYDROCHLORIDE 0.1 MG: 0.1 TABLET ORAL at 02:37

## 2019-06-26 RX ADMIN — ACETAMINOPHEN 650 MG: 325 TABLET ORAL at 02:37

## 2019-06-26 RX ADMIN — ACETAMINOPHEN 650 MG: 325 TABLET ORAL at 10:19

## 2019-06-26 RX ADMIN — GABAPENTIN 300 MG: 300 CAPSULE ORAL at 02:37

## 2019-06-26 RX ADMIN — ACETAMINOPHEN 650 MG: 325 TABLET ORAL at 15:44

## 2019-06-26 RX ADMIN — PROMETHAZINE HYDROCHLORIDE 25 MG: 25 TABLET ORAL at 10:19

## 2019-06-26 RX ADMIN — LEVOFLOXACIN 500 MG: 500 TABLET, FILM COATED ORAL at 10:19

## 2019-06-26 RX ADMIN — HYDROXYZINE PAMOATE 50 MG: 50 CAPSULE ORAL at 02:37

## 2019-06-26 RX ADMIN — CLONIDINE HYDROCHLORIDE 0.1 MG: 0.1 TABLET ORAL at 10:19

## 2019-06-26 RX ADMIN — BUPRENORPHINE HCL 2 MG: 2 TABLET SUBLINGUAL at 02:37

## 2019-06-26 RX ADMIN — BUPRENORPHINE HCL 2 MG: 2 TABLET SUBLINGUAL at 10:19

## 2019-06-26 ASSESSMENT — PAIN SCALES - GENERAL
PAINLEVEL_OUTOF10: 0
PAINLEVEL_OUTOF10: 10

## 2019-06-26 ASSESSMENT — PAIN DESCRIPTION - ONSET: ONSET: ON-GOING

## 2019-06-26 ASSESSMENT — PAIN DESCRIPTION - PROGRESSION: CLINICAL_PROGRESSION: NOT CHANGED

## 2019-06-26 ASSESSMENT — PAIN DESCRIPTION - DESCRIPTORS: DESCRIPTORS: ACHING;CONSTANT;CRAMPING;SORE

## 2019-06-26 ASSESSMENT — PAIN DESCRIPTION - PAIN TYPE: TYPE: ACUTE PAIN

## 2019-06-26 ASSESSMENT — PAIN DESCRIPTION - FREQUENCY: FREQUENCY: CONTINUOUS

## 2019-06-26 ASSESSMENT — PAIN - FUNCTIONAL ASSESSMENT: PAIN_FUNCTIONAL_ASSESSMENT: ACTIVITIES ARE NOT PREVENTED

## 2019-06-26 ASSESSMENT — PAIN DESCRIPTION - ORIENTATION: ORIENTATION: RIGHT;LEFT

## 2019-06-26 ASSESSMENT — PAIN DESCRIPTION - LOCATION: LOCATION: GENERALIZED

## 2019-06-26 NOTE — FLOWSHEET NOTE
Pt is a 27 yo woman. Pt was laying on her side in bed. Pt was agitated and in constant movement. Pt stated she has no [employer]regation she is a part of and that she does not have the support of family or friends.  prayed with the pt. Spiritual care to continue to offer support and encouragement. 06/25/19 1910   Encounter Summary   Services provided to: Patient   [facility]   Support System Unknown   Continue Visiting Yes  (6/25)   Complexity of Encounter Low   Length of Encounter 15 minutes   Routine   Type Initial   Assessment Approachable; Anxious   Intervention Active listening;Explored feelings, thoughts, concerns;Explored coping resources;Prayer   Outcome Acceptance;Engaged in conversation

## 2019-06-26 NOTE — PROGRESS NOTES
Discharge teaching and instructions for diagnosis and procedures of opiate withdraw completed with patient using teachback method. AVS reviewed. Printed prescriptions given to patient. Patient voiced understanding regarding prescriptions, follow up appointments, and care of self at home. Discharged home via cab.

## 2019-06-26 NOTE — PROGRESS NOTES
Pt resting in bed, eyes closed, respirations even and unlabored. Requested not to be disturbed if sleeping. Call light and possessions in reach.

## 2019-06-26 NOTE — PLAN OF CARE
Problem: Safety:  Goal: Free from accidental physical injury  Description  Free from accidental physical injury  Outcome: Met This Shift  Note:   Remains free from injury this shift. Problem: Discharge Planning:  Goal: Patients continuum of care needs are met  Description  Patients continuum of care needs are met  Outcome: Ongoing  Note:   Plans ongoing. Problem: Nutrition  Goal: Optimal nutrition therapy  Outcome: Ongoing  Note:   Supplements ordered per dietician. Monitoring intake. Problem: Daily Care:  Goal: Daily care needs are met  Description  Daily care needs are met  Outcome: Completed     Problem: Skin Integrity:  Goal: Skin integrity will stabilize  Description  Skin integrity will stabilize  Outcome: Completed   Care plan reviewed with patient. Patient verbalize understanding of the plan of care and contribute to goal setting.

## 2019-06-26 NOTE — DISCHARGE SUMMARY
Provider Discharge Summary     Patient ID:  Hollis Springer  [id number]  23 y.o.  1985    Admit date: 6/22/2019    Discharge date and time: 6/26/2019  11:49 AM     Admitting Physician: Tianna Bautista MD     Discharge Physician: Tianna Bautista MD    Admission Diagnoses: Opiate withdrawal ([address] [F11.23]  Opiate withdrawal ([address] [F11.23]  Sepsis ([address] [A41.9]    Discharge Diagnoses:      Opiate withdrawal ([address]     Patient Active Problem List   Diagnosis Code    Narcotic withdrawal ([address] F11.23    Borderline personality disorder ([address] F60.3    Bipolar  disorder, History F31.9    Hepatitis C B19.20    Neck pain on left side M54.2    Pregnant and not yet delivered in second trimester Z34.92    Cutaneous abscess of neck L02.11    Bleeding R58    Opiate withdrawal ([address] F11.23    Opioid use disorder, severe, dependence ([address] F11.20    Sepsis ([address] A41.9        Admission Condition: poor    Discharged Condition: stable    Indication for Admission: Severe opioid withdrawals unable to manage safely at home    History of Present Illnes (present tense wording is of findings from admission exam and are not necessarily indicative of current findings):   Janette Boothe  is a 26year old female with severe opioid use disorder presented to the emergency department for severe opioid withdrawals unable to manage safely at home.   Patient has a long history of severe opioid use disorder, causing significant stress in patient's life, characterized by :  - larger amount of opioid used by the patient and whenever possible, using substances for longer duration  - patient has made attempts, unsuccessfully, to stop or cut down the abuse of opioid, in the past  - significant time is spent, by the patient, to obtain the drugs, almost every day  - patient is craving for opioids  - opioid abuse has significantly affected patient's relationship, social life, interpersonal relationship  - patient's social and recreational life has been negatively affected, secondary to opioid abuse  - patient continues to abuse opioid, despite the knowledge that it is affecting her physical and psychological life  - patient has developed tolerance, as manifested by increased amount of opioid to achieve the desired effect of feeling high  - patient complains of having withdrawal symptoms, when not taking opioid and at times patient abuses opioid to prevent withdrawal symptoms. Patient's last use of opioid was 3 days ago ,now going through withdrawal from Opioid, characterized by :  -Dysphoric and irritable mood,feels miserable  -Nausea  -Muscle aches allover the body  -Photophobia,sweating  -Abdominal cramps,diarrhea   -Yawning  -Insomnia      Hospital Course:   Upon admission, Mary Merrill was provided a safe secure environment, introduced to unit milieu. Patient participated in individual therapies and treatment team with social work. Meds were adjusted as noted below. After few days of hospital care, patient began to feel improvement. Patient's withdrawal symptoms ceased with the help of Subutex and supportive management based on withdrawal protocol associated with COWS scores. Sleep improved, appetite was good. On morning rounds 6/26/2019, Mary Merrill  endorses feeling ready for discharge. Patient denies suicidal or homicidal ideations, denies hallucinations or delusions. Denies SE's from meds. It was decided that maximum benefit from hospital care had been achieved and patient can be discharged. Consults:   Hospitalist- for pneumonia, she was started on levofloxacin    Significant Diagnostic Studies: Routine labs and diagnostics    Treatments: Withdrawal symptom management using supportive care. Discharge Medications:  Current Discharge Medication List      START taking these medications    Details   buprenorphine-naloxone (SUBOXONE) 2-0.5 MG FILM SL film Place 1 Film under the tongue daily for 3 days.   Qty: 3 Film, Refills: 0 Associated Diagnoses: Opiate withdrawal (HCC)      levofloxacin (LEVAQUIN) 500 MG tablet Take 1 tablet by mouth daily for 5 days  Qty: 5 tablet, Refills: 0         STOP taking these medications       GABAPENTIN PO Comments:   Reason for Stopping:                Discharge Exam:  Level of consciousness:  Within normal limits  Appearance: Street clothes, seated, with good grooming  Behavior/Motor: No abnormalities noted  Attitude toward examiner:  Cooperative, attentive, good eye contact  Speech:  spontaneous, normal rate, normal volume and well articulated  Mood:  euthymic  Affect:  Full range  Thought processes:  linear, goal directed and coherent  Thought content:  denies homicidal ideation  Suicidal Ideation:  denies suicidal ideation  Delusions:  no evidence of delusions  Perceptual Disturbance:  denies any perceptual disturbance  Cognition:  Intact  Memory: age appropriate  Insight & Judgement: fair  Medication side effects: denies     Disposition: home    Patient Instructions: Activity: activity as tolerated  1. Patient instructed to take medications regularly and follow up with outpatient appointments. 2.  Patient was instructed to follow up with NA meetings and IOP program.    Follow-up as scheduled with Dr Usama Mcfarlane in [address] [place] [address] for tomorrow morning at 9:15 am       Signed:    Electronically signed by Aubrey Kearns MD on 6/26/19 at 11:49 AM    Time Spent on discharge is more than 35 minutes in the examination, evaluation, counseling and review of medications and discharge plan.

## 2019-06-26 NOTE — PROGRESS NOTES
Hospitalist Progress Note    Patient:  Hali Gleason      Unit/Bed:8B-26/026-A    YOB: 1985    MRN: [id number]       Acct: [de-identified]     PCP: CARLOS Marino CNP    Date of Admission: 2019    Assessment/Plan:    1. Fever- patient found to have pneumonia- will continue levaquin 500mg PO daily for total of 7 day course. Patient stable for discharge. 2. Opiate withdrawal- psychiatry following. Expected discharge date:  today    Disposition:    [x] Home       [] TCU       [] Rehab       [] Psych       [] SNF       [] [facility]       [] Other-    Chief Complaint: Pain    Hospital Course: Patient admitted for opiate withdrawal and found to have pneumonia. She was placed on levaquin 500mg PO daily. Her fever and SOB has improved. Subjective (past 24 hours): Patient seen and examined at bedside. Patient has no new complaints. Medications:  Reviewed    Infusion Medications   Scheduled Medications    acetaminophen  650 mg Oral TID    [START ON 2019] buprenorphine-naloxone  1 Film Sublingual Daily    levofloxacin  500 mg Oral Daily    melatonin ER  2 mg Oral Nightly    nicotine  1 patch Transdermal Daily     PRN Meds: acetaminophen, [] buprenorphine **AND** cloNIDine, dicyclomine, hydrOXYzine, promethazine, traZODone, gabapentin      Intake/Output Summary (Last 24 hours) at 2019 1248  Last data filed at 2019 2130  Gross per 24 hour   Intake 400 ml   Output --   Net 400 ml       Diet:  DIET GENERAL;  Dietary Nutrition Supplements: Frozen Oral Supplement    Exam:  /77   Pulse 115   Temp 99.4 °F (37.4 °C) (Oral)   Resp 20   Ht 5' 6\" (1.676 m)   Wt 181 lb 9.6 oz (82.4 kg)   LMP 2019   SpO2 93%   BMI 29.31 kg/m²     General appearance: No apparent distress, appears stated age and cooperative. HEENT: Pupils equal, round, and reactive to light. Conjunctivae/corneas clear. Neck: Supple, with full range of motion. signed by Dr. Andrea Martinez on 6/25/2019 1:27 PM      XR THORACIC SPINE (3 VIEWS)   Final Result    No acute findings. **This report has been created using voice recognition software. It may contain minor errors which are inherent in voice recognition technology. **      Final report electronically signed by Dr. Nicolas Dunham on 6/22/2019 5:46 AM      XR RIBS BILATERAL (MIN 4 VIEWS)   Final Result   1. Negative exam for obvious rib fracture. 2. Mild bibasilar atelectasis changes. Underlying infiltrate is not excluded. **This report has been created using voice recognition software. It may contain minor errors which are inherent in voice recognition technology. **      Final report electronically signed by Dr. Nicolas Dunham on 6/22/2019 5:45 AM          DVT prophylaxis: [] Lovenox                                 [] SCDs                                 [] SQ Heparin                                 [x] Encourage ambulation           [] Already on Anticoagulation     Code Status: Full Code    PT/OT Eval Status:     Tele:   [] yes             [x] no    Active Hospital Problems    Diagnosis Date Noted    Sepsis ([facility] [A41.9]     Opioid use disorder, severe, dependence ([address] [F11.20]     Opiate withdrawal ([address] [F11.23] 06/22/2019       Electronically signed by Leena Díaz MD on 6/26/2019 at 12:48 PM

## 2019-06-27 NOTE — CARE COORDINATION
6/27/19, 3:15 PM    Discharge plan discussed by  and . Discharge plan reviewed with patient/ family. Patient/ family verbalize understanding of discharge plan and are in agreement with plan. Understanding was demonstrated using the teach back method. Patient discharged on 6/26 to home with follow up with Dr Gloria Penn and [facility] clinic.

## 2019-07-12 ENCOUNTER — APPOINTMENT (OUTPATIENT)
Dept: GENERAL RADIOLOGY | Age: 34
DRG: 137 | End: 2019-07-12
Payer: MEDICAID

## 2019-07-12 ENCOUNTER — APPOINTMENT (OUTPATIENT)
Dept: CT IMAGING | Age: 34
DRG: 137 | End: 2019-07-12
Payer: MEDICAID

## 2019-07-12 ENCOUNTER — HOSPITAL ENCOUNTER (INPATIENT)
Age: 34
LOS: 5 days | Discharge: LEFT BEFORE DISCHARGE | DRG: 137 | End: 2019-07-17
Attending: INTERNAL MEDICINE | Admitting: INTERNAL MEDICINE
Payer: MEDICAID

## 2019-07-12 DIAGNOSIS — J98.4 CAVITARY LESION OF LUNG: ICD-10-CM

## 2019-07-12 DIAGNOSIS — F11.10 HEROIN ABUSE (HCC): ICD-10-CM

## 2019-07-12 DIAGNOSIS — J90 LOCULATED PLEURAL EFFUSION: Primary | ICD-10-CM

## 2019-07-12 LAB
AMORPHOUS: ABNORMAL
ANION GAP SERPL CALCULATED.3IONS-SCNC: 13 MEQ/L (ref 8–16)
BACTERIA: ABNORMAL /HPF
BASOPHILS # BLD: 0.4 %
BASOPHILS ABSOLUTE: 0 THOU/MM3 (ref 0–0.1)
BILIRUBIN URINE: NEGATIVE
BLOOD, URINE: NEGATIVE
BUN BLDV-MCNC: 9 MG/DL (ref 7–22)
CALCIUM SERPL-MCNC: 9.4 MG/DL (ref 8.5–10.5)
CASTS UA: ABNORMAL /LPF
CHARACTER, URINE: ABNORMAL
CHLORIDE BLD-SCNC: 101 MEQ/L (ref 98–111)
CO2: 25 MEQ/L (ref 23–33)
COLOR: ABNORMAL
CREAT SERPL-MCNC: 0.5 MG/DL (ref 0.4–1.2)
CRYSTALS, UA: ABNORMAL
EOSINOPHIL # BLD: 2.4 %
EOSINOPHILS ABSOLUTE: 0.2 THOU/MM3 (ref 0–0.4)
EPITHELIAL CELLS, UA: ABNORMAL /HPF
ERYTHROCYTE [DISTWIDTH] IN BLOOD BY AUTOMATED COUNT: 15.4 % (ref 11.5–14.5)
ERYTHROCYTE [DISTWIDTH] IN BLOOD BY AUTOMATED COUNT: 50.7 FL (ref 35–45)
GFR SERPL CREATININE-BSD FRML MDRD: > 90 ML/MIN/1.73M2
GLUCOSE BLD-MCNC: 135 MG/DL (ref 70–108)
GLUCOSE URINE: NEGATIVE MG/DL
GLUCOSE, FLUID: < 2 MG/DL
HCT VFR BLD CALC: 34.1 % (ref 37–47)
HEMOGLOBIN: 10.3 GM/DL (ref 12–16)
IMMATURE GRANS (ABS): 0.03 THOU/MM3 (ref 0–0.07)
IMMATURE GRANULOCYTES: 0.4 %
KETONES, URINE: NEGATIVE
LACTIC ACID: 2 MMOL/L (ref 0.5–2.2)
LD, FLUID: > 2500 U/L
LD: 197 U/L (ref 100–190)
LEUKOCYTE ESTERASE, URINE: NEGATIVE
LV EF: 55 %
LVEF MODALITY: NORMAL
LYMPHOCYTES # BLD: 25 %
LYMPHOCYTES ABSOLUTE: 1.7 THOU/MM3 (ref 1–4.8)
MCH RBC QN AUTO: 27.1 PG (ref 26–33)
MCHC RBC AUTO-ENTMCNC: 30.2 GM/DL (ref 32.2–35.5)
MCV RBC AUTO: 89.7 FL (ref 81–99)
MONOCYTES # BLD: 5.2 %
MONOCYTES ABSOLUTE: 0.3 THOU/MM3 (ref 0.4–1.3)
MUCUS: ABNORMAL
NITRITE, URINE: NEGATIVE
NUCLEATED RED BLOOD CELLS: 0 /100 WBC
OSMOLALITY CALCULATION: 278.3 MOSMOL/KG (ref 275–300)
PH FLUID: 7.22
PH UA: 6 (ref 5–9)
PLATELET # BLD: 426 THOU/MM3 (ref 130–400)
PMV BLD AUTO: 9.2 FL (ref 9.4–12.4)
POTASSIUM SERPL-SCNC: 3.7 MEQ/L (ref 3.5–5.2)
PREGNANCY, URINE: NEGATIVE
PROCALCITONIN: 0.14 NG/ML (ref 0.01–0.09)
PROTEIN FLUID: 5.7 GM/DL
PROTEIN UA: NEGATIVE
RBC # BLD: 3.8 MILL/MM3 (ref 4.2–5.4)
RBC URINE: ABNORMAL /HPF
SEG NEUTROPHILS: 66.6 %
SEGMENTED NEUTROPHILS ABSOLUTE COUNT: 4.5 THOU/MM3 (ref 1.8–7.7)
SODIUM BLD-SCNC: 139 MEQ/L (ref 135–145)
SPECIFIC GRAVITY, URINE: > 1.03 (ref 1–1.03)
TOTAL PROTEIN: 8 G/DL (ref 6.1–8)
UROBILINOGEN, URINE: >= 8 EU/DL (ref 0–1)
WBC # BLD: 6.7 THOU/MM3 (ref 4.8–10.8)
WBC UA: ABNORMAL /HPF

## 2019-07-12 PROCEDURE — 6360000002 HC RX W HCPCS: Performed by: INTERNAL MEDICINE

## 2019-07-12 PROCEDURE — 87899 AGENT NOS ASSAY W/OPTIC: CPT

## 2019-07-12 PROCEDURE — 84157 ASSAY OF PROTEIN OTHER: CPT

## 2019-07-12 PROCEDURE — 2580000003 HC RX 258: Performed by: INTERNAL MEDICINE

## 2019-07-12 PROCEDURE — 80048 BASIC METABOLIC PNL TOTAL CA: CPT

## 2019-07-12 PROCEDURE — 87102 FUNGUS ISOLATION CULTURE: CPT

## 2019-07-12 PROCEDURE — 87449 NOS EACH ORGANISM AG IA: CPT

## 2019-07-12 PROCEDURE — 87070 CULTURE OTHR SPECIMN AEROBIC: CPT

## 2019-07-12 PROCEDURE — 32551 INSERTION OF CHEST TUBE: CPT

## 2019-07-12 PROCEDURE — 77012 CT SCAN FOR NEEDLE BIOPSY: CPT

## 2019-07-12 PROCEDURE — C1729 CATH, DRAINAGE: HCPCS

## 2019-07-12 PROCEDURE — 83986 ASSAY PH BODY FLUID NOS: CPT

## 2019-07-12 PROCEDURE — 71046 X-RAY EXAM CHEST 2 VIEWS: CPT

## 2019-07-12 PROCEDURE — 88112 CYTOPATH CELL ENHANCE TECH: CPT

## 2019-07-12 PROCEDURE — 36415 COLL VENOUS BLD VENIPUNCTURE: CPT

## 2019-07-12 PROCEDURE — C1769 GUIDE WIRE: HCPCS

## 2019-07-12 PROCEDURE — 88305 TISSUE EXAM BY PATHOLOGIST: CPT

## 2019-07-12 PROCEDURE — 99233 SBSQ HOSP IP/OBS HIGH 50: CPT | Performed by: INTERNAL MEDICINE

## 2019-07-12 PROCEDURE — 2580000003 HC RX 258: Performed by: NURSE PRACTITIONER

## 2019-07-12 PROCEDURE — 6370000000 HC RX 637 (ALT 250 FOR IP): Performed by: PSYCHIATRY & NEUROLOGY

## 2019-07-12 PROCEDURE — 85025 COMPLETE CBC W/AUTO DIFF WBC: CPT

## 2019-07-12 PROCEDURE — 6360000002 HC RX W HCPCS: Performed by: RADIOLOGY

## 2019-07-12 PROCEDURE — 87205 SMEAR GRAM STAIN: CPT

## 2019-07-12 PROCEDURE — 84155 ASSAY OF PROTEIN SERUM: CPT

## 2019-07-12 PROCEDURE — 87389 HIV-1 AG W/HIV-1&-2 AB AG IA: CPT

## 2019-07-12 PROCEDURE — 87086 URINE CULTURE/COLONY COUNT: CPT

## 2019-07-12 PROCEDURE — 89050 BODY FLUID CELL COUNT: CPT

## 2019-07-12 PROCEDURE — 93306 TTE W/DOPPLER COMPLETE: CPT

## 2019-07-12 PROCEDURE — 90792 PSYCH DIAG EVAL W/MED SRVCS: CPT | Performed by: PSYCHIATRY & NEUROLOGY

## 2019-07-12 PROCEDURE — 2709999900 HC NON-CHARGEABLE SUPPLY

## 2019-07-12 PROCEDURE — 81025 URINE PREGNANCY TEST: CPT

## 2019-07-12 PROCEDURE — 87040 BLOOD CULTURE FOR BACTERIA: CPT

## 2019-07-12 PROCEDURE — 83605 ASSAY OF LACTIC ACID: CPT

## 2019-07-12 PROCEDURE — 71260 CT THORAX DX C+: CPT

## 2019-07-12 PROCEDURE — 82945 GLUCOSE OTHER FLUID: CPT

## 2019-07-12 PROCEDURE — 6370000000 HC RX 637 (ALT 250 FOR IP): Performed by: RADIOLOGY

## 2019-07-12 PROCEDURE — 6360000002 HC RX W HCPCS: Performed by: NURSE PRACTITIONER

## 2019-07-12 PROCEDURE — 6360000002 HC RX W HCPCS

## 2019-07-12 PROCEDURE — 6370000000 HC RX 637 (ALT 250 FOR IP): Performed by: INTERNAL MEDICINE

## 2019-07-12 PROCEDURE — 87116 MYCOBACTERIA CULTURE: CPT

## 2019-07-12 PROCEDURE — C1894 INTRO/SHEATH, NON-LASER: HCPCS

## 2019-07-12 PROCEDURE — 96365 THER/PROPH/DIAG IV INF INIT: CPT

## 2019-07-12 PROCEDURE — 84145 PROCALCITONIN (PCT): CPT

## 2019-07-12 PROCEDURE — 6360000004 HC RX CONTRAST MEDICATION: Performed by: NURSE PRACTITIONER

## 2019-07-12 PROCEDURE — 99285 EMERGENCY DEPT VISIT HI MDM: CPT

## 2019-07-12 PROCEDURE — 96366 THER/PROPH/DIAG IV INF ADDON: CPT

## 2019-07-12 PROCEDURE — 81001 URINALYSIS AUTO W/SCOPE: CPT

## 2019-07-12 PROCEDURE — 0W9930Z DRAINAGE OF RIGHT PLEURAL CAVITY WITH DRAINAGE DEVICE, PERCUTANEOUS APPROACH: ICD-10-PCS | Performed by: RADIOLOGY

## 2019-07-12 PROCEDURE — 87075 CULTR BACTERIA EXCEPT BLOOD: CPT

## 2019-07-12 PROCEDURE — 83615 LACTATE (LD) (LDH) ENZYME: CPT

## 2019-07-12 PROCEDURE — 1200000000 HC SEMI PRIVATE

## 2019-07-12 RX ORDER — SODIUM CHLORIDE 0.9 % (FLUSH) 0.9 %
10 SYRINGE (ML) INJECTION EVERY 12 HOURS SCHEDULED
Status: DISCONTINUED | OUTPATIENT
Start: 2019-07-12 | End: 2019-07-15

## 2019-07-12 RX ORDER — SODIUM CHLORIDE 0.9 % (FLUSH) 0.9 %
10 SYRINGE (ML) INJECTION PRN
Status: DISCONTINUED | OUTPATIENT
Start: 2019-07-12 | End: 2019-07-15

## 2019-07-12 RX ORDER — TRAZODONE HYDROCHLORIDE 50 MG/1
50 TABLET ORAL NIGHTLY PRN
Status: DISCONTINUED | OUTPATIENT
Start: 2019-07-12 | End: 2019-07-18 | Stop reason: HOSPADM

## 2019-07-12 RX ORDER — LIDOCAINE 4 G/G
1 PATCH TOPICAL DAILY
Status: DISCONTINUED | OUTPATIENT
Start: 2019-07-12 | End: 2019-07-18 | Stop reason: HOSPADM

## 2019-07-12 RX ORDER — PROMETHAZINE HYDROCHLORIDE 25 MG/1
25 TABLET ORAL EVERY 6 HOURS PRN
Status: DISCONTINUED | OUTPATIENT
Start: 2019-07-12 | End: 2019-07-18 | Stop reason: HOSPADM

## 2019-07-12 RX ORDER — POTASSIUM CHLORIDE 20 MEQ/1
40 TABLET, EXTENDED RELEASE ORAL PRN
Status: DISCONTINUED | OUTPATIENT
Start: 2019-07-12 | End: 2019-07-18 | Stop reason: HOSPADM

## 2019-07-12 RX ORDER — ONDANSETRON 2 MG/ML
4 INJECTION INTRAMUSCULAR; INTRAVENOUS EVERY 6 HOURS PRN
Status: DISCONTINUED | OUTPATIENT
Start: 2019-07-12 | End: 2019-07-18 | Stop reason: HOSPADM

## 2019-07-12 RX ORDER — DIAPER,BRIEF,INFANT-TODD,DISP
EACH MISCELLANEOUS ONCE
Status: COMPLETED | OUTPATIENT
Start: 2019-07-12 | End: 2019-07-12

## 2019-07-12 RX ORDER — GABAPENTIN 600 MG/1
800 TABLET ORAL 3 TIMES DAILY
COMMUNITY
End: 2020-07-05

## 2019-07-12 RX ORDER — MIDAZOLAM HYDROCHLORIDE 1 MG/ML
2 INJECTION INTRAMUSCULAR; INTRAVENOUS ONCE
Status: COMPLETED | OUTPATIENT
Start: 2019-07-12 | End: 2019-07-12

## 2019-07-12 RX ORDER — BUPRENORPHINE 2 MG/1
2 TABLET SUBLINGUAL PRN
Status: DISPENSED | OUTPATIENT
Start: 2019-07-12 | End: 2019-07-15

## 2019-07-12 RX ORDER — ACETAMINOPHEN 325 MG/1
650 TABLET ORAL EVERY 4 HOURS PRN
Status: DISCONTINUED | OUTPATIENT
Start: 2019-07-12 | End: 2019-07-18 | Stop reason: HOSPADM

## 2019-07-12 RX ORDER — HYDROXYZINE PAMOATE 50 MG/1
50 CAPSULE ORAL EVERY 8 HOURS PRN
Status: DISCONTINUED | OUTPATIENT
Start: 2019-07-12 | End: 2019-07-18 | Stop reason: HOSPADM

## 2019-07-12 RX ORDER — CLONIDINE HYDROCHLORIDE 0.1 MG/1
0.1 TABLET ORAL PRN
Status: DISCONTINUED | OUTPATIENT
Start: 2019-07-12 | End: 2019-07-15

## 2019-07-12 RX ORDER — ACETAMINOPHEN 325 MG/1
650 TABLET ORAL EVERY 4 HOURS PRN
Status: CANCELLED | OUTPATIENT
Start: 2019-07-12

## 2019-07-12 RX ORDER — SODIUM CHLORIDE 9 MG/ML
INJECTION, SOLUTION INTRAVENOUS CONTINUOUS
Status: ACTIVE | OUTPATIENT
Start: 2019-07-12 | End: 2019-07-13

## 2019-07-12 RX ORDER — LIDOCAINE 4 G/G
1 PATCH TOPICAL DAILY
Status: CANCELLED | OUTPATIENT
Start: 2019-07-12

## 2019-07-12 RX ORDER — DICYCLOMINE HCL 20 MG
20 TABLET ORAL EVERY 6 HOURS PRN
Status: DISCONTINUED | OUTPATIENT
Start: 2019-07-12 | End: 2019-07-18 | Stop reason: HOSPADM

## 2019-07-12 RX ORDER — POTASSIUM CHLORIDE 7.45 MG/ML
10 INJECTION INTRAVENOUS PRN
Status: DISCONTINUED | OUTPATIENT
Start: 2019-07-12 | End: 2019-07-18 | Stop reason: HOSPADM

## 2019-07-12 RX ORDER — NICOTINE 21 MG/24HR
1 PATCH, TRANSDERMAL 24 HOURS TRANSDERMAL DAILY
Status: DISCONTINUED | OUTPATIENT
Start: 2019-07-12 | End: 2019-07-18 | Stop reason: HOSPADM

## 2019-07-12 RX ADMIN — MIDAZOLAM 2 MG: 1 INJECTION INTRAMUSCULAR; INTRAVENOUS at 15:17

## 2019-07-12 RX ADMIN — BACITRACIN ZINC 1 G: 500 OINTMENT TOPICAL at 16:41

## 2019-07-12 RX ADMIN — MIDAZOLAM 2 MG: 1 INJECTION INTRAMUSCULAR; INTRAVENOUS at 14:49

## 2019-07-12 RX ADMIN — CEFEPIME HYDROCHLORIDE 2 G: 2 INJECTION, POWDER, FOR SOLUTION INTRAVENOUS at 08:50

## 2019-07-12 RX ADMIN — TRAZODONE HYDROCHLORIDE 50 MG: 50 TABLET ORAL at 20:13

## 2019-07-12 RX ADMIN — VANCOMYCIN HYDROCHLORIDE 1250 MG: 1 INJECTION, POWDER, LYOPHILIZED, FOR SOLUTION INTRAVENOUS at 04:04

## 2019-07-12 RX ADMIN — HYDROXYZINE PAMOATE 50 MG: 50 CAPSULE ORAL at 20:08

## 2019-07-12 RX ADMIN — IOPAMIDOL 80 ML: 755 INJECTION, SOLUTION INTRAVENOUS at 03:22

## 2019-07-12 RX ADMIN — CEFEPIME HYDROCHLORIDE 2 G: 2 INJECTION, POWDER, FOR SOLUTION INTRAVENOUS at 22:58

## 2019-07-12 RX ADMIN — VANCOMYCIN HYDROCHLORIDE 1250 MG: 5 INJECTION, POWDER, LYOPHILIZED, FOR SOLUTION INTRAVENOUS at 16:41

## 2019-07-12 RX ADMIN — SODIUM CHLORIDE: 9 INJECTION, SOLUTION INTRAVENOUS at 06:57

## 2019-07-12 ASSESSMENT — PAIN DESCRIPTION - ONSET
ONSET: ON-GOING
ONSET_2: ON-GOING
ONSET: ON-GOING

## 2019-07-12 ASSESSMENT — PAIN SCALES - GENERAL
PAINLEVEL_OUTOF10: 10

## 2019-07-12 ASSESSMENT — PAIN DESCRIPTION - FREQUENCY
FREQUENCY: CONTINUOUS

## 2019-07-12 ASSESSMENT — PAIN DESCRIPTION - INTENSITY: RATING_2: 10

## 2019-07-12 ASSESSMENT — PAIN - FUNCTIONAL ASSESSMENT
PAIN_FUNCTIONAL_ASSESSMENT: ACTIVITIES ARE NOT PREVENTED

## 2019-07-12 ASSESSMENT — PAIN DESCRIPTION - ORIENTATION
ORIENTATION: RIGHT
ORIENTATION_2: RIGHT;UPPER
ORIENTATION: RIGHT
ORIENTATION: LEFT
ORIENTATION: RIGHT

## 2019-07-12 ASSESSMENT — PAIN DESCRIPTION - LOCATION
LOCATION: FLANK
LOCATION: CHEST;FLANK
LOCATION: CHEST;BACK
LOCATION_2: BACK
LOCATION: CHEST;FLANK

## 2019-07-12 ASSESSMENT — PAIN DESCRIPTION - DESCRIPTORS
DESCRIPTORS: TIGHTNESS
DESCRIPTORS: DISCOMFORT
DESCRIPTORS: DISCOMFORT
DESCRIPTORS: CONSTANT
DESCRIPTORS_2: DISCOMFORT
DESCRIPTORS: CRAMPING;CONSTANT
DESCRIPTORS: DISCOMFORT

## 2019-07-12 ASSESSMENT — PAIN DESCRIPTION - PAIN TYPE
TYPE_2: ACUTE PAIN
TYPE: ACUTE PAIN
TYPE: ACUTE PAIN;SURGICAL PAIN
TYPE: ACUTE PAIN

## 2019-07-12 ASSESSMENT — ENCOUNTER SYMPTOMS
WHEEZING: 0
VOMITING: 0
EYE PAIN: 0
SHORTNESS OF BREATH: 1
COUGH: 0
BACK PAIN: 0
DIARRHEA: 0
RHINORRHEA: 0
EYE DISCHARGE: 0
SORE THROAT: 0
ABDOMINAL PAIN: 0
NAUSEA: 0

## 2019-07-12 ASSESSMENT — PAIN DESCRIPTION - DIRECTION
RADIATING_TOWARDS: RIGHT

## 2019-07-12 ASSESSMENT — PAIN DESCRIPTION - PROGRESSION
CLINICAL_PROGRESSION: NOT CHANGED
CLINICAL_PROGRESSION_2: NOT CHANGED
CLINICAL_PROGRESSION: NOT CHANGED

## 2019-07-12 ASSESSMENT — PAIN DESCRIPTION - DURATION: DURATION_2: CONTINUOUS

## 2019-07-12 ASSESSMENT — PATIENT HEALTH QUESTIONNAIRE - PHQ9: SUM OF ALL RESPONSES TO PHQ QUESTIONS 1-9: 9

## 2019-07-12 NOTE — ED PROVIDER NOTES
(1.702 m) and weight is 181 lb (82.1 kg). Her oral temperature is 98.3 °F (36.8 °C). Her blood pressure is 90/57 (abnormal) and her pulse is 76. Her respiration is 16 and oxygen saturation is 93%. Physical Exam   Constitutional: She is oriented to person, place, and time. She appears well-developed and well-nourished. Non-toxic appearance. HENT:   Head: Normocephalic and atraumatic. Right Ear: Tympanic membrane and external ear normal.   Left Ear: Tympanic membrane and external ear normal.   Nose: Nose normal.   Mouth/Throat: Oropharynx is clear and moist and mucous membranes are normal. No oropharyngeal exudate, posterior oropharyngeal edema or posterior oropharyngeal erythema. Eyes: Conjunctivae and EOM are normal.   Neck: Normal range of motion. Neck supple. No JVD present. Cardiovascular: Normal rate, regular rhythm, normal heart sounds, intact distal pulses and normal pulses. Exam reveals no gallop and no friction rub. No murmur heard. Pulmonary/Chest: Effort normal and breath sounds normal. No respiratory distress. She has no decreased breath sounds. She has no wheezes. She has no rhonchi. She has no rales. Abdominal: Soft. Bowel sounds are normal. She exhibits no distension. There is no tenderness. There is no rebound, no guarding and no CVA tenderness. Musculoskeletal: Normal range of motion. She exhibits no edema. Neurological: She is alert and oriented to person, place, and time. She exhibits normal muscle tone. Coordination normal.   Skin: Skin is warm and dry. No rash noted. She is not diaphoretic. Obvious track marks consistent with opioid injections. Psychiatric: She is agitated and hyperactive. Nursing note and vitals reviewed.         DIFFERENTIAL DIAGNOSIS:   Including but not limited to substance abuse, anxiety, depression, rib fracture, muscle strain, contusion, pneumothorax     DIAGNOSTIC RESULTS     RADIOLOGY: non-plain film images(s) such as CT, Ultrasound and MRI are the patient's condition with Dr. Iqra Suarez [facility]) who graciously agreed to admit. Patient was told that she cannot leave the department to smoke or for anything else due to her having an IV in her arm and being a patient. Strict return precautions and follow up instructions were discussed with the patient with which the patient agrees     Physical assessment findings, diagnostic testing(s) if applicable, and vital signs reviewed with patient/patient representative. Questions answered. Medications asdirected, including OTC medications for supportive care. Education provided on medications. Differential diagnosis(s) discussed with patient/patient representative. Home care/self care instructions reviewed withpatient/patient representative. Patient is to follow-up with family care provider in 2-3 days if no improvement. Patient is to go to the emergency department if symptoms worsen. Patient/patient representative isaware of care plan, questions answered, verbalizes understanding and is in agreement.      ED Medications administered this visit:   Medications   cefepime (MAXIPIME) 2 g IVPB minibag (has no administration in time range)   sodium chloride flush 0.9 % injection 10 mL (has no administration in time range)   sodium chloride flush 0.9 % injection 10 mL (has no administration in time range)   potassium chloride (KLOR-CON M) extended release tablet 40 mEq (has no administration in time range)     Or   potassium bicarb-citric acid (EFFER-K) effervescent tablet 40 mEq (has no administration in time range)     Or   potassium chloride 10 mEq/100 mL IVPB (Peripheral Line) (has no administration in time range)   magnesium hydroxide (MILK OF MAGNESIA) 400 MG/5ML suspension 30 mL (has no administration in time range)   ondansetron (ZOFRAN) injection 4 mg (has no administration in time range)   0.9 % sodium chloride infusion (has no administration in time range)   enoxaparin (LOVENOX) injection 40 mg (has no administration in time range)   acetaminophen (TYLENOL) tablet 650 mg (has no administration in time range)   nicotine (NICODERM CQ) 21 MG/24HR 1 patch (has no administration in time range)   iopamidol (ISOVUE-370) 76 % injection 80 mL (80 mLs Intravenous Given 7/12/19 3833)   vancomycin (VANCOCIN) 1,250 mg in dextrose 5 % 250 mL IVPB (1,250 mg Intravenous New Bag 7/12/19 6872)           I have given the patient strict written and verbal instructions about care at home,follow-up, and signs and symptoms of worsening of condition and they did verbalize understanding. Patient was seen independently by myself. The Patient's final impression and disposition and plan was determined by myself. CRITICAL CARE:   none    CONSULTS:  Dr. Urvashi Morrell (Hospitalist)    PROCEDURES:  None    FINAL IMPRESSION      1. Loculated pleural effusion    2. Cavitary lesion of lung    3. Heroin abuse [facility])          DISPOSITION/PLAN   DISPOSITION Admitted 07/12/2019 06:04:48 AM        PATIENT REFERRED TO:    Dr. Mello Barrett [phone]   Option#7  Please call for appointment. CARLOS Almonte CNP  Luige Mahin [phone]            DISCHARGE MEDICATIONS:  There are no discharge medications for this patient. (Please note that portions of this note were completed with a voice recognition program.  Efforts were made to edit thedictations but occasionally words are mis-transcribed.)    Scribe:  Katelin Casillas 7/12/19 5:02 AM Scribing for and in the presence of Silvia Marie CNP     Signed by: Chris Otto, 07/12/19 6:40 AM    Provider:  I personally performed the services described in the documentation, reviewed and edited the documentation which was dictated to the scribe in my presence, and it accurately records my words and actions.     Silvia Marie CNP  7/12/19 6:40 AM         CARLOS Troncoso CNP, APRN - CNP  07/12/19 7663
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
## 2019-07-12 NOTE — PROGRESS NOTES
Phone Screening for     [facility] Inpatient Opioid, Benzodiazepine and Alcohol Withdrawal Program       The C-SSRS Brief Screening, Audit, Dast, and PHQ Have Been Completed in Addition to this Screening-Please See Flowsheets      Initial Information:      Date/Time of Call:  2019   01:33    Demographic Information:     Name: Bismark Luis    /Age:  1985      Phone number:  NA      Do you have Stable Housing? Address? Yes, [facility]    Primary Care Provider:  Denies    Family Support: Patient reports she resides 'with someone' . Presenting Problem: Alcohol or Opioid Withdrawal:     Current Withdrawal Symptoms:    No ___ Yes __xxx__ (check all that apply)     Tremors ___ Vomiting _x___ Runny Nose __x___ Nausea ____x__   Diarrhea __x__ Chills __x____ Headache __x____ Restless _x_____   Unsteady Gait ___ Body Aches __x__ Fever _x___ Abdominal Pain ________   Sweats ___x___ Dizziness ______ Elevated vital Signs ____ Seizures __________     Alcohol Use ONLY:      Are you currently intoxicated? Last Alcohol use : (specify when, daily amount, and what type of alcohol)         Alcohol: History of Blackouts/DTs? Last date sober? Benzodiazepines:     Current Use or withdrawal concern   Denies    Opioid use ONLY:     Last Opioid use: (Specify when, Daily use, and what type of opioid)   'Yesterday afternoon '  Poor historian for use. History of Overdose (specify dates)? Denies     Current Medical Information:     Current Medical Concerns     Currently Pregnant? (females)    Current outpatient treatment for mental health or substance abuse disorders? Bipolar , Depression. Denies current treatment. Treatment History: (When, Where, What)    History of Psychiatric Hospitalizations? (Dates) Denies     History of inpatient detox? (Dates) Patient was a recent admit to detox at [address] [place] [address] with discharge date 2019. History of Addictions Rehab?

## 2019-07-12 NOTE — PROGRESS NOTES
Pharmacy Note  Vancomycin Consult    Brian Olson is a 26 y.o. female started on Vancomycin for loculated pleural effusion; consult received from Dr. Dari Green to manage therapy. Also receiving the following antibiotics: cefepime. Patient Active Problem List   Diagnosis    Narcotic withdrawal ([facility] [address]    Borderline [facility] disorder ([facility]    Bipolar  disorder, History    Hepatitis C    Neck pain on left side    Pregnant and not yet delivered in second trimester    Cutaneous abscess of neck    Bleeding    Opiate withdrawal (HCC)    Opioid use disorder, severe, dependence (HCC)    Sepsis ([facility]    Loculated pleural effusion       Allergies:  Ketorolac tromethamine; Naproxen; Nsaids; Nubain [nalbuphine hcl]; and Pcn [penicillins]     Temp max: afebrile    Recent Labs     07/12/19  0230   BUN 9       Recent Labs     07/12/19  0230   CREATININE 0.5       Recent Labs     07/12/19  0230   WBC 6.7       No intake or output data in the 24 hours ending 07/12/19 0737    Culture Date Source Results   7/12/19 BC x2 Sent   7/12/19 Respiratory culture    7/12/19 Fungus culture        Ht Readings from Last 1 Encounters:   07/12/19 5' 7\" (1.702 m)        Wt Readings from Last 1 Encounters:   07/12/19 181 lb (82.1 kg)         Body mass index is 28.35 kg/m². CrCl: 173 mL/min  Estimated GFR >90mL/min      Assessment/Plan:  Patient received a dose of vancomycin 1250mg in the ER 7/12/19  Will initiate vancomycin 1250 mg IV every 12 hours. Timing of trough level will be determined based on culture results, renal function, and clinical response. Will plan to check a trough prior to the 4th dose - not ordered at this time. Thank you for the consult. Will continue to follow and monitor.    Mir Gresham, Sin 7/12/2019 7:40 AM

## 2019-07-12 NOTE — PROGRESS NOTES
chest pain. On arrival, found to have large right loculated pleural effusion. ID consulted, s/p IR guided pigtail catheter placement and drainage 7/12. Subjective (past 24 hours): Patient seen at bedside, currently endorsing 10/10 pain, in right ribs shoulder blade region, worse with inspiration, constant sharp pain. Had some loose stools, and no vomiting but is nauseous. Tearful and uncomfortable during exam.     States she has been going through pain for about 3 weeks, and has poor living conditions. Has been using about 2 grams of heroin IV daily. Last use was 7/11 prior to arrival.  Patient also endorses depression, and has been using more frequently. Medications:  Reviewed    Infusion Medications    sodium chloride 75 mL/hr at 07/12/19 0657     Scheduled Medications    sodium chloride flush  10 mL Intravenous 2 times per day    [Held by provider] enoxaparin  40 mg Subcutaneous Q24H    nicotine  1 patch Transdermal Daily    vancomycin (VANCOCIN) intermittent dosing (placeholder)   Other RX Placeholder    vancomycin  1,250 mg Intravenous Q12H    lidocaine  1 patch Transdermal Daily     PRN Meds: sodium chloride flush, potassium chloride **OR** potassium alternative oral replacement **OR** potassium chloride, magnesium hydroxide, ondansetron, acetaminophen    No intake or output data in the 24 hours ending 07/12/19 1317  Weight change:       Exam:  BP 96/63   Pulse 62   Temp 99.4 °F (37.4 °C) (Oral)   Resp 18   Ht 5' 7\" (1.702 m)   Wt 181 lb (82.1 kg)   LMP 06/21/2019   SpO2 95%   BMI 28.35 kg/m²     General appearance: Appears uncomfortable, on room air. Eyes:  Pupils equal, round, and reactive to light. Conjunctivae/corneas clear. HENT: Head normal in appearance. External nares normal.  Oral mucosa moist without lesions. Hearing grossly intact. Neck: Supple, with full range of motion. No jugular venous distention. Trachea midline. Respiratory:  Normal respiratory effort.

## 2019-07-12 NOTE — PROGRESS NOTES
Classification: BMI 25.0 - 29.9 Overweight    Nutrition Interventions:   Continue current diet  Continued Inpatient Monitoring, Education Initiated, Coordination of Care    Nutrition Evaluation:   · Evaluation: Goals set   · Goals: Patient will consume 75% or more of meals during LOS.      · Monitoring: Meal Intake, Diet Tolerance, Skin Integrity, Weight, Pertinent Labs, Monitor Bowel Function      Electronically signed by Romaine Sheth RD, LD on 7/12/19 at 12:46 PM    Contact Number: [phone]

## 2019-07-12 NOTE — PROGRESS NOTES
1413 Pt arrived to CT holding. Skin natural for race, warm, dry. Respirations even and unlabored. Pain 10/10 to right chest.   1428 Dr. Christine Rosenthal in to evaluate pt and explain procedure. Consent obtained. 1440 Pt positioned prone on CT table. Monitor applied. 1450 Procedure started. 1525 Procedure complete. Specimen obtained and sent to lab. 1535 Pt awaiting transport to  via cart. Report called to Mino. Skin natural for race, warm, dry. Respirations even and unlabored.  Pain 10/10 to right chest.

## 2019-07-13 LAB
BODY FLUID RBC: < 2000 /CUMM
CHARACTER, BODY FLUID: ABNORMAL
COLOR: YELLOW
MONONUCLEAR CELLS BODY FLUID: 21.6 %
PATHOLOGIST REVIEW: ABNORMAL
POLYMORPHONUCLEAR CELLS BODY FLUID: 78.4 %
SPECIMEN: ABNORMAL
TOTAL NUCLEATED CELLS BODY FLUID: 1038 /CUMM (ref 0–500)
TOTAL VOLUME RECEIVED BODY FLUID: 18 ML

## 2019-07-13 PROCEDURE — 6370000000 HC RX 637 (ALT 250 FOR IP): Performed by: INTERNAL MEDICINE

## 2019-07-13 PROCEDURE — 2580000003 HC RX 258: Performed by: INTERNAL MEDICINE

## 2019-07-13 PROCEDURE — 99232 SBSQ HOSP IP/OBS MODERATE 35: CPT | Performed by: PSYCHIATRY & NEUROLOGY

## 2019-07-13 PROCEDURE — 6360000002 HC RX W HCPCS: Performed by: PSYCHIATRY & NEUROLOGY

## 2019-07-13 PROCEDURE — 1200000000 HC SEMI PRIVATE

## 2019-07-13 PROCEDURE — 6360000002 HC RX W HCPCS: Performed by: INTERNAL MEDICINE

## 2019-07-13 PROCEDURE — 2709999900 HC NON-CHARGEABLE SUPPLY

## 2019-07-13 PROCEDURE — 6370000000 HC RX 637 (ALT 250 FOR IP): Performed by: PSYCHIATRY & NEUROLOGY

## 2019-07-13 PROCEDURE — 99233 SBSQ HOSP IP/OBS HIGH 50: CPT | Performed by: INTERNAL MEDICINE

## 2019-07-13 RX ADMIN — CEFEPIME HYDROCHLORIDE 2 G: 2 INJECTION, POWDER, FOR SOLUTION INTRAVENOUS at 11:29

## 2019-07-13 RX ADMIN — HYDROXYZINE PAMOATE 50 MG: 50 CAPSULE ORAL at 06:23

## 2019-07-13 RX ADMIN — TRAZODONE HYDROCHLORIDE 50 MG: 50 TABLET ORAL at 19:39

## 2019-07-13 RX ADMIN — BUPRENORPHINE HCL 2 MG: 2 TABLET SUBLINGUAL at 17:19

## 2019-07-13 RX ADMIN — CLONIDINE HYDROCHLORIDE 0.1 MG: 0.1 TABLET ORAL at 17:19

## 2019-07-13 RX ADMIN — VANCOMYCIN HYDROCHLORIDE 1250 MG: 5 INJECTION, POWDER, LYOPHILIZED, FOR SOLUTION INTRAVENOUS at 16:55

## 2019-07-13 RX ADMIN — VANCOMYCIN HYDROCHLORIDE 1250 MG: 5 INJECTION, POWDER, LYOPHILIZED, FOR SOLUTION INTRAVENOUS at 04:41

## 2019-07-13 RX ADMIN — HYDROXYZINE PAMOATE 50 MG: 50 CAPSULE ORAL at 19:39

## 2019-07-13 RX ADMIN — BUPRENORPHINE HCL 2 MG: 2 TABLET SUBLINGUAL at 14:16

## 2019-07-13 RX ADMIN — BUPRENORPHINE HCL 2 MG: 2 TABLET SUBLINGUAL at 19:39

## 2019-07-13 RX ADMIN — CEFEPIME HYDROCHLORIDE 2 G: 2 INJECTION, POWDER, FOR SOLUTION INTRAVENOUS at 22:30

## 2019-07-13 RX ADMIN — Medication 2 MG: at 19:39

## 2019-07-13 RX ADMIN — CLONIDINE HYDROCHLORIDE 0.1 MG: 0.1 TABLET ORAL at 19:39

## 2019-07-13 ASSESSMENT — PAIN SCALES - GENERAL
PAINLEVEL_OUTOF10: 0
PAINLEVEL_OUTOF10: 6
PAINLEVEL_OUTOF10: 10
PAINLEVEL_OUTOF10: 10
PAINLEVEL_OUTOF10: 0
PAINLEVEL_OUTOF10: 5
PAINLEVEL_OUTOF10: 7
PAINLEVEL_OUTOF10: 10
PAINLEVEL_OUTOF10: 10
PAINLEVEL_OUTOF10: 5

## 2019-07-13 ASSESSMENT — PAIN DESCRIPTION - FREQUENCY: FREQUENCY: CONTINUOUS

## 2019-07-13 ASSESSMENT — PAIN - FUNCTIONAL ASSESSMENT: PAIN_FUNCTIONAL_ASSESSMENT: ACTIVITIES ARE NOT PREVENTED

## 2019-07-13 ASSESSMENT — PAIN DESCRIPTION - ONSET: ONSET: ON-GOING

## 2019-07-13 ASSESSMENT — PAIN DESCRIPTION - PAIN TYPE
TYPE: ACUTE PAIN

## 2019-07-13 ASSESSMENT — PAIN DESCRIPTION - LOCATION
LOCATION: GENERALIZED
LOCATION: CHEST;ABDOMEN
LOCATION: GENERALIZED

## 2019-07-13 ASSESSMENT — PAIN DESCRIPTION - DIRECTION: RADIATING_TOWARDS: RIGHT

## 2019-07-13 ASSESSMENT — PAIN DESCRIPTION - ORIENTATION: ORIENTATION: RIGHT

## 2019-07-13 ASSESSMENT — PAIN DESCRIPTION - PROGRESSION: CLINICAL_PROGRESSION: NOT CHANGED

## 2019-07-13 ASSESSMENT — PAIN DESCRIPTION - DESCRIPTORS: DESCRIPTORS: DISCOMFORT;PRESSURE

## 2019-07-13 NOTE — PROGRESS NOTES
melatonin ER  2 mg Oral Nightly       sodium chloride flush, potassium chloride **OR** potassium alternative oral replacement **OR** potassium chloride, magnesium hydroxide, ondansetron, acetaminophen, buprenorphine **AND** cloNIDine, dicyclomine, hydrOXYzine, promethazine, traZODone      LABS:     CBC:   Recent Labs     07/12/19 0230   WBC 6.7   HGB 10.3*   *     BMP:    Recent Labs     07/12/19 0230      K 3.7      CO2 25   BUN 9   CREATININE 0.5   GLUCOSE 135*     Calcium:  Recent Labs     07/12/19 0230   CALCIUM 9.4    Hepatic:   Recent Labs     07/12/19 0230   PROT 8.0     Amylase and Lipase:  Recent Labs     07/12/19 0230   LACTA 2.0     Lactic Acid:   Recent Labs     07/12/19 0230   LACTA 2.0        CULTURES:   UA:   Recent Labs     07/12/19  0805 07/12/19  1519   PHUR 6.0  --    COLORU DK YELLOW* YELLOW   MUCUS NONE SEEN  --    PROTEINU NEGATIVE  --    BLOODU NEGATIVE  --    RBCUA 0-2  --    WBCUA 10-15  --    BACTERIA NONE  --    NITRU NEGATIVE  --    GLUCOSEU NEGATIVE  --    BILIRUBINUR NEGATIVE  --    UROBILINOGEN >= 8.0  --    KETUA NEGATIVE  --      Micro:   Lab Results   Component Value Date    BC No growth-preliminary 07/12/2019         IMAGING:         Problem list of patient:     Patient Active Problem List   Diagnosis Code    Narcotic withdrawal ([facility] [address] F11.23    Borderline personality disorder ([facility] [address] F60.3    Bipolar  disorder, History F31.9    Hepatitis C B19.20    Neck pain on left side M54.2    Pregnant and not yet delivered in second trimester Z34.92    Cutaneous abscess of neck L02.11    Bleeding R58    Opiate withdrawal (HCC) F11.23    Opioid use disorder, severe, dependence (HCC) F11.20    Sepsis (HCC) A41.9    Loculated pleural effusion J90         ASSESSMENT/PLAN   Loculated empyema right side  Hx of IVDA  Continue current antibiotic  Will follow cx report      Rajiv Ambrose MD, FACP 7/13/2019 12:29 PM

## 2019-07-13 NOTE — PROGRESS NOTES
Flushed pleurex drain with 10 ml sterile saline using sterile procedure per MD's order. Clear drainage noted. Dressing clean, dry, and intact.

## 2019-07-13 NOTE — PROGRESS NOTES
Department of Psychiatry  Consult Service  Progress Note     Reason for Consult: Opioid abuse    SUBJECTIVE:    She was going to severe withdrawal symptoms today. She reports withdrawal symptoms as well as an grams, abdominal cramps, restlessness, nausea, sweating, runny nose and dysphoria. She was repeatedly asking to go Subutex.     OBJECTIVE      Medications  Current Facility-Administered Medications: sodium chloride flush 0.9 % injection 10 mL, 10 mL, Intravenous, 2 times per day  sodium chloride flush 0.9 % injection 10 mL, 10 mL, Intravenous, PRN  potassium chloride (KLOR-CON M) extended release tablet 40 mEq, 40 mEq, Oral, PRN **OR** potassium bicarb-citric acid (EFFER-K) effervescent tablet 40 mEq, 40 mEq, Oral, PRN **OR** potassium chloride 10 mEq/100 mL IVPB (Peripheral Line), 10 mEq, Intravenous, PRN  magnesium hydroxide (MILK OF MAGNESIA) 400 MG/5ML suspension 30 mL, 30 mL, Oral, Daily PRN  ondansetron (ZOFRAN) injection 4 mg, 4 mg, Intravenous, Q6H PRN  [Held by provider] enoxaparin (LOVENOX) injection 40 mg, 40 mg, Subcutaneous, Q24H  acetaminophen (TYLENOL) tablet 650 mg, 650 mg, Oral, Q4H PRN  nicotine (NICODERM CQ) 21 MG/24HR 1 patch, 1 patch, Transdermal, Daily  vancomycin (VANCOCIN) intermittent dosing (placeholder), , Other, RX Placeholder  vancomycin (VANCOCIN) 1,250 mg in dextrose 5 % 250 mL IVPB, 1,250 mg, Intravenous, Q12H  lidocaine 4 % external patch 1 patch, 1 patch, Transdermal, Daily  cefepime (MAXIPIME) 2 g IVPB minibag, 2 g, Intravenous, Q12H  buprenorphine (SUBUTEX) SL tablet 2 mg, 2 mg, Sublingual, PRN **AND** cloNIDine (CATAPRES) tablet 0.1 mg, 0.1 mg, Oral, PRN  melatonin ER tablet 2 mg, 2 mg, Oral, Nightly  dicyclomine (BENTYL) tablet 20 mg, 20 mg, Oral, Q6H PRN  hydrOXYzine (VISTARIL) capsule 50 mg, 50 mg, Oral, Q8H PRN  promethazine (PHENERGAN) tablet 25 mg, 25 mg, Oral, Q6H PRN  traZODone (DESYREL) tablet 50 mg, 50 mg, Oral, Nightly PRN     Physical  BP (!) 112/57   Pulse 75

## 2019-07-13 NOTE — PROGRESS NOTES
Pleurex drain flushed with 10 ml sterile saline using sterile technique per MD's order. Draining well. Dressing clean, dry, and intact.

## 2019-07-13 NOTE — PROGRESS NOTES
Patient resting comfortably in bed. Pleurex drain flushed with 10 ml sterile saline using sterile procedure per MD's order. Draining clear fluid. Patient tolerated well. COWS assessment complete (score 4), will reassess in 4 hours.

## 2019-07-13 NOTE — PROGRESS NOTES
Rehab       [] Psych       [] SNF       [] [place]       [] Other-    --------------------------------------------    Chief Complaint: Chest/rib pain    Hospital Course: Patient s a 27yo F with PMHx of opioid abuse (IV heroin), hep C, bipolar/anxiety/depression (as listed in chart), anemia who presents with right chest pain. On arrival, found to have large right loculated pleural effusion. ID consulted, s/p IR guided pigtail catheter placement and drainage 7/12. States she has been going through pain for about 3 weeks, and has poor living conditions. Has been using about 2 grams of heroin IV daily. Last use was 7/11 prior to arrival.  Patient also endorses depression, and has been using more frequently. Subjective (past 24 hours): Patient seen at bedside, currently endorsing 10/10 pain and feeling \"super sick. \"  Reports feeling of opiate withdrawal, with chills, diarrhea, and nausea. Sweating. No emesis. No fevers, and is satting well on room air. Easily falls back asleep after my examination.      Refused labs this am.           Medications:  Reviewed    Infusion Medications     Scheduled Medications    sodium chloride flush  10 mL Intravenous 2 times per day    [Held by provider] enoxaparin  40 mg Subcutaneous Q24H    nicotine  1 patch Transdermal Daily    vancomycin (VANCOCIN) intermittent dosing (placeholder)   Other RX Placeholder    vancomycin  1,250 mg Intravenous Q12H    lidocaine  1 patch Transdermal Daily    cefepime  2 g Intravenous Q12H    melatonin ER  2 mg Oral Nightly     PRN Meds: sodium chloride flush, potassium chloride **OR** potassium alternative oral replacement **OR** potassium chloride, magnesium hydroxide, ondansetron, acetaminophen, buprenorphine **AND** cloNIDine, dicyclomine, hydrOXYzine, promethazine, traZODone      Intake/Output Summary (Last 24 hours) at 7/13/2019 0873  Last data filed at 7/13/2019 0449  Gross per 24 hour   Intake 2600.25 ml Output 75 ml   Net 2525.25 ml     Weight change:       Exam:  /71   Pulse 87   Temp 97.9 °F (36.6 °C) (Oral)   Resp 20   Ht 5' 7\" (1.702 m)   Wt 181 lb (82.1 kg)   LMP 06/21/2019   SpO2 98%   BMI 28.35 kg/m²     General appearance: Appears uncomfortable, on room air, diaphoretic. Eyes:  Pupils equal, round, and reactive to light, dilated to 6-7mm. Conjunctivae/corneas clear. HENT: Head normal in appearance. External nares normal.  Oral mucosa moist without lesions. Hearing grossly intact. Neck: Supple, with full range of motion. No jugular venous distention. Trachea midline. Respiratory:  Normal respiratory effort. Improved aeration in bases, right catheter in place in posterior thorax draining penny/serosanguinous fluid. Cardiovascular: Normal rate, regular rhythm with normal S1/S2 without murmurs. No lower extremity edema. Abdomen: Soft, non-tender, non-distended with normal bowel sounds. Musculoskeletal: Normal range of motion in extremities. There is no joint swelling or tenderness. Skin: Skin color, texture, turgor normal.  Track marks on arms bilaterally. Neurologic:  Neurovascularly intact without any focal sensory/motor deficits in the upper and lower extremities. Cranial nerves:  grossly non-focal.  Psychiatric: Alert and oriented, thought content appropriate, pressured speech. Capillary Refill: Brisk,< 3 seconds. Peripheral Pulses: +2 palpable, equal bilaterally. Labs:   Recent Labs     07/12/19  0230   WBC 6.7   HGB 10.3*   HCT 34.1*   *     Recent Labs     07/12/19  0230      K 3.7      CO2 25   BUN 9   CREATININE 0.5   CALCIUM 9.4     No results for input(s): AST, ALT, BILIDIR, BILITOT, ALKPHOS in the last 72 hours. No results for input(s): INR in the last 72 hours. No results for input(s): Jacinto Ryland in the last 72 hours.     Microbiology:      Urinalysis:      Lab Results   Component Value Date    NITRU NEGATIVE 07/12/2019    WBCUA as an additional large loculated pleural effusion in the minor fissure. Mid and lower right lung atelectasis. No pneumonia. Consider chest CT with contrast for further evaluation. **This report has been created using voice recognition software. It may contain minor errors which are inherent in voice recognition technology. **      Final report electronically signed by Dr. Cassie Ramirez on 7/12/2019 3:01 AM          DVT prophylaxis: [] Lovenox                                  [x] SCDs with pigtail catheter in place                                 [] SQ Heparin                                 [] Encourage ambulation           [] Already on Anticoagulation     Code Status: Full Code    PT/OT Eval Status: tbd    Diet:   DIET GENERAL;    Fluids: yes    Tele:   [x] yes             [] no      Electronically signed by Luis Russell DO on 7/13/2019 at 8:56 AM

## 2019-07-14 LAB
ANION GAP SERPL CALCULATED.3IONS-SCNC: 10 MEQ/L (ref 8–16)
BUN BLDV-MCNC: 8 MG/DL (ref 7–22)
CALCIUM SERPL-MCNC: 8.9 MG/DL (ref 8.5–10.5)
CHLORIDE BLD-SCNC: 108 MEQ/L (ref 98–111)
CO2: 22 MEQ/L (ref 23–33)
CREAT SERPL-MCNC: 0.4 MG/DL (ref 0.4–1.2)
ERYTHROCYTE [DISTWIDTH] IN BLOOD BY AUTOMATED COUNT: 15.6 % (ref 11.5–14.5)
ERYTHROCYTE [DISTWIDTH] IN BLOOD BY AUTOMATED COUNT: 50.3 FL (ref 35–45)
FERRITIN: 125 NG/ML (ref 10–291)
FOLATE: 14.4 NG/ML (ref 4.8–24.2)
GFR SERPL CREATININE-BSD FRML MDRD: > 90 ML/MIN/1.73M2
GLUCOSE BLD-MCNC: 104 MG/DL (ref 70–108)
HCT VFR BLD CALC: 32.4 % (ref 37–47)
HEMOGLOBIN: 10 GM/DL (ref 12–16)
HIV-2 AB: NEGATIVE
IRON SATURATION: 25 % (ref 20–50)
IRON: 48 UG/DL (ref 50–170)
LEGIONELLA URINARY AG: NEGATIVE
MCH RBC QN AUTO: 27.3 PG (ref 26–33)
MCHC RBC AUTO-ENTMCNC: 30.9 GM/DL (ref 32.2–35.5)
MCV RBC AUTO: 88.5 FL (ref 81–99)
ORGANISM: ABNORMAL
PLATELET # BLD: 334 THOU/MM3 (ref 130–400)
PMV BLD AUTO: 9.4 FL (ref 9.4–12.4)
POTASSIUM SERPL-SCNC: 4.1 MEQ/L (ref 3.5–5.2)
RBC # BLD: 3.66 MILL/MM3 (ref 4.2–5.4)
SODIUM BLD-SCNC: 140 MEQ/L (ref 135–145)
STREP PNEUMO AG, UR: NEGATIVE
TOTAL IRON BINDING CAPACITY: 194 UG/DL (ref 171–450)
URINE CULTURE REFLEX: ABNORMAL
VANCOMYCIN TROUGH: 8.2 UG/ML (ref 5–15)
VITAMIN B-12: 403 PG/ML (ref 211–911)
WBC # BLD: 5.2 THOU/MM3 (ref 4.8–10.8)

## 2019-07-14 PROCEDURE — 82607 VITAMIN B-12: CPT

## 2019-07-14 PROCEDURE — 83550 IRON BINDING TEST: CPT

## 2019-07-14 PROCEDURE — 80202 ASSAY OF VANCOMYCIN: CPT

## 2019-07-14 PROCEDURE — 2580000003 HC RX 258: Performed by: INTERNAL MEDICINE

## 2019-07-14 PROCEDURE — 82746 ASSAY OF FOLIC ACID SERUM: CPT

## 2019-07-14 PROCEDURE — 6360000002 HC RX W HCPCS: Performed by: INTERNAL MEDICINE

## 2019-07-14 PROCEDURE — 6360000002 HC RX W HCPCS: Performed by: PHARMACIST

## 2019-07-14 PROCEDURE — 36415 COLL VENOUS BLD VENIPUNCTURE: CPT

## 2019-07-14 PROCEDURE — 99233 SBSQ HOSP IP/OBS HIGH 50: CPT | Performed by: INTERNAL MEDICINE

## 2019-07-14 PROCEDURE — 6370000000 HC RX 637 (ALT 250 FOR IP): Performed by: PSYCHIATRY & NEUROLOGY

## 2019-07-14 PROCEDURE — 99231 SBSQ HOSP IP/OBS SF/LOW 25: CPT | Performed by: PSYCHIATRY & NEUROLOGY

## 2019-07-14 PROCEDURE — 6360000002 HC RX W HCPCS: Performed by: PSYCHIATRY & NEUROLOGY

## 2019-07-14 PROCEDURE — 80048 BASIC METABOLIC PNL TOTAL CA: CPT

## 2019-07-14 PROCEDURE — 2580000003 HC RX 258: Performed by: PHARMACIST

## 2019-07-14 PROCEDURE — 1200000000 HC SEMI PRIVATE

## 2019-07-14 PROCEDURE — 82728 ASSAY OF FERRITIN: CPT

## 2019-07-14 PROCEDURE — 83540 ASSAY OF IRON: CPT

## 2019-07-14 PROCEDURE — 85027 COMPLETE CBC AUTOMATED: CPT

## 2019-07-14 RX ADMIN — BUPRENORPHINE HCL 2 MG: 2 TABLET SUBLINGUAL at 19:54

## 2019-07-14 RX ADMIN — CLONIDINE HYDROCHLORIDE 0.1 MG: 0.1 TABLET ORAL at 11:56

## 2019-07-14 RX ADMIN — DICYCLOMINE HYDROCHLORIDE 20 MG: 20 TABLET ORAL at 02:10

## 2019-07-14 RX ADMIN — VANCOMYCIN HYDROCHLORIDE 1250 MG: 5 INJECTION, POWDER, LYOPHILIZED, FOR SOLUTION INTRAVENOUS at 04:51

## 2019-07-14 RX ADMIN — SODIUM CHLORIDE, PRESERVATIVE FREE 10 ML: 5 INJECTION INTRAVENOUS at 20:24

## 2019-07-14 RX ADMIN — BUPRENORPHINE HCL 2 MG: 2 TABLET SUBLINGUAL at 02:10

## 2019-07-14 RX ADMIN — BUPRENORPHINE HCL 2 MG: 2 TABLET SUBLINGUAL at 15:13

## 2019-07-14 RX ADMIN — HYDROXYZINE PAMOATE 50 MG: 50 CAPSULE ORAL at 17:34

## 2019-07-14 RX ADMIN — BUPRENORPHINE HCL 2 MG: 2 TABLET SUBLINGUAL at 11:58

## 2019-07-14 RX ADMIN — VANCOMYCIN HYDROCHLORIDE 1000 MG: 1 INJECTION, POWDER, LYOPHILIZED, FOR SOLUTION INTRAVENOUS at 20:24

## 2019-07-14 RX ADMIN — CLONIDINE HYDROCHLORIDE 0.1 MG: 0.1 TABLET ORAL at 19:54

## 2019-07-14 RX ADMIN — TRAZODONE HYDROCHLORIDE 50 MG: 50 TABLET ORAL at 17:34

## 2019-07-14 RX ADMIN — CEFEPIME HYDROCHLORIDE 2 G: 2 INJECTION, POWDER, FOR SOLUTION INTRAVENOUS at 23:06

## 2019-07-14 RX ADMIN — CLONIDINE HYDROCHLORIDE 0.1 MG: 0.1 TABLET ORAL at 09:13

## 2019-07-14 RX ADMIN — BUPRENORPHINE HCL 2 MG: 2 TABLET SUBLINGUAL at 17:32

## 2019-07-14 RX ADMIN — CLONIDINE HYDROCHLORIDE 0.1 MG: 0.1 TABLET ORAL at 02:09

## 2019-07-14 RX ADMIN — HYDROXYZINE PAMOATE 50 MG: 50 CAPSULE ORAL at 09:12

## 2019-07-14 RX ADMIN — Medication 2 MG: at 19:54

## 2019-07-14 RX ADMIN — BUPRENORPHINE HCL 2 MG: 2 TABLET SUBLINGUAL at 09:12

## 2019-07-14 RX ADMIN — CEFEPIME HYDROCHLORIDE 2 G: 2 INJECTION, POWDER, FOR SOLUTION INTRAVENOUS at 11:51

## 2019-07-14 ASSESSMENT — PAIN DESCRIPTION - ONSET
ONSET: ON-GOING
ONSET: ON-GOING

## 2019-07-14 ASSESSMENT — PAIN DESCRIPTION - ORIENTATION
ORIENTATION: RIGHT
ORIENTATION: MID;RIGHT

## 2019-07-14 ASSESSMENT — PAIN DESCRIPTION - DIRECTION: RADIATING_TOWARDS: RIGHT

## 2019-07-14 ASSESSMENT — PAIN SCALES - GENERAL
PAINLEVEL_OUTOF10: 9
PAINLEVEL_OUTOF10: 9
PAINLEVEL_OUTOF10: 10
PAINLEVEL_OUTOF10: 10
PAINLEVEL_OUTOF10: 4
PAINLEVEL_OUTOF10: 0
PAINLEVEL_OUTOF10: 8
PAINLEVEL_OUTOF10: 9
PAINLEVEL_OUTOF10: 7
PAINLEVEL_OUTOF10: 10
PAINLEVEL_OUTOF10: 7
PAINLEVEL_OUTOF10: 7

## 2019-07-14 ASSESSMENT — PAIN DESCRIPTION - PAIN TYPE
TYPE: ACUTE PAIN
TYPE: ACUTE PAIN

## 2019-07-14 ASSESSMENT — PAIN DESCRIPTION - FREQUENCY
FREQUENCY: CONTINUOUS
FREQUENCY: CONTINUOUS

## 2019-07-14 ASSESSMENT — PAIN DESCRIPTION - DESCRIPTORS
DESCRIPTORS: DISCOMFORT
DESCRIPTORS: ACHING;DISCOMFORT

## 2019-07-14 ASSESSMENT — PAIN DESCRIPTION - LOCATION
LOCATION: CHEST;BACK
LOCATION: ABDOMEN;BACK

## 2019-07-14 ASSESSMENT — PAIN DESCRIPTION - PROGRESSION
CLINICAL_PROGRESSION: NOT CHANGED
CLINICAL_PROGRESSION: NOT CHANGED

## 2019-07-14 ASSESSMENT — PAIN - FUNCTIONAL ASSESSMENT
PAIN_FUNCTIONAL_ASSESSMENT: ACTIVITIES ARE NOT PREVENTED
PAIN_FUNCTIONAL_ASSESSMENT: ACTIVITIES ARE NOT PREVENTED

## 2019-07-14 NOTE — PROGRESS NOTES
in voice recognition technology. **      Final report electronically signed by Dr. Perez Cooper on 7/12/2019 4:17 AM      XR CHEST STANDARD (2 VW)   Final Result      Interval development of a large ovoid soft tissue mass in the lateral right apex most likely representing a loculated pleural effusion as well as an additional large loculated pleural effusion in the minor fissure. Mid and lower right lung atelectasis. No pneumonia. Consider chest CT with contrast for further evaluation. **This report has been created using voice recognition software. It may contain minor errors which are inherent in voice recognition technology. **      Final report electronically signed by Dr. Perez Cooper on 7/12/2019 3:01 AM          DVT prophylaxis: [] Lovenox                                  [x] SCDs with pigtail catheter in place                                 [] SQ Heparin                                 [] Encourage ambulation           [] Already on Anticoagulation     Code Status: Full Code    PT/OT Eval Status: tbd    Diet:   DIET GENERAL;    Fluids: yes    Tele:   [x] yes             [] no      Electronically signed by Nitza Rodríguez DO on 7/14/2019 at 9:38 AM

## 2019-07-14 NOTE — PROGRESS NOTES
Pharmacy Vancomycin Consult     Vancomycin Day: 3  Current Dosin mg Q12H     Temp max:  98.5    Recent Labs     19  0230   BUN 9       Recent Labs     19  0230   CREATININE 0.5       Recent Labs     19  0230   WBC 6.7         Intake/Output Summary (Last 24 hours) at 2019 0711  Last data filed at 2019 2152  Gross per 24 hour   Intake 2185.87 ml   Output 25 ml   Net 2160.87 ml       Cultures/Sensitivities:  Date Source Result   19 Pleural fluid No growth prelim   19 Urine sent   19 Blood X 2  No growth prelim         Ht Readings from Last 1 Encounters:   19 5' 7\" (1.702 m)        Wt Readings from Last 1 Encounters:   19 181 lb (82.1 kg)         Body mass index is 28.35 kg/m². Estimated Creatinine Clearance: 173 mL/min (based on SCr of 0.5 mg/dL).     Trough: 8.2 mcg/ml    Assessment/Plan:  S/P pigtail catheter  [facility] consulted, possible TPA/ Dornase   Change Vancomycin 1000 mg Q8H  Check trough prior to 4th dose of new regimen, 07/15/19     Joey Marino PharmD 2019 7:32 AM

## 2019-07-14 NOTE — PROGRESS NOTES
51   Temp 97.7 °F (36.5 °C) (Oral)   Resp 18   Ht 5' 7\" (1.702 m)   Wt 181 lb (82.1 kg)   LMP 06/21/2019   SpO2 98%   BMI 28.35 kg/m²     Mental Status Examination:    Level of consciousness:  Within normal limits  Appearance: good grooming  Behavior/Motor: No abnormalities noted  Attitude toward examiner:  cooperative  Speech:  Spontaneous, normal rate and volume  Mood:  anxious  Affect:  Full range  Thought processes:  linear coherent  Thought content: denies suicidal or homicidal ideations, denies hallucinations or delusions, does not appear to be responding to internal stimuli   Memory: age appropriate  Insight & Judgement: improving  Medication side effects:  denies       ASSESSMENT    Opioid use disorder severe dependence  Acute opioid withdrawal     Patient Active Problem List   Diagnosis    Narcotic withdrawal ([address]    Borderline personality disorder ([address]    Bipolar  disorder, History    Hepatitis C    Neck pain on left side    Pregnant and not yet delivered in second trimester    Cutaneous abscess of neck    Bleeding    Opiate withdrawal ([address]    Opioid use disorder, severe, dependence ([address]    Sepsis ([address]    Loculated pleural effusion        PLAN  Continue Subutex based on COWS protocol  Will continue to follow    Electronically signed by Kelly Stubbs on 7/14/2019 at 4:08 PM time spent 15 minutes

## 2019-07-14 NOTE — PROGRESS NOTES
Hospital day 2  S/P Right Upper Pleural Chest tube    Subjective:     Ms. Brooks Vega reports no major complaints. No fevers, no chills.       Scheduled Meds:   vancomycin  1,000 mg Intravenous Q8H    sodium chloride flush  10 mL Intravenous 2 times per day    [Held by provider] enoxaparin  40 mg Subcutaneous Q24H    nicotine  1 patch Transdermal Daily    vancomycin (VANCOCIN) intermittent dosing (placeholder)   Other RX Placeholder    lidocaine  1 patch Transdermal Daily    cefepime  2 g Intravenous Q12H    melatonin ER  2 mg Oral Nightly     Continuous Infusions:  PRN Meds:sodium chloride flush, potassium chloride **OR** potassium alternative oral replacement **OR** potassium chloride, magnesium hydroxide, ondansetron, acetaminophen, buprenorphine **AND** cloNIDine, dicyclomine, hydrOXYzine, promethazine, traZODone      Objective:      Physical Exam:   BP (!) 96/51   Pulse 56   Temp 98.2 °F (36.8 °C) (Oral)   Resp 17   Ht 5' 7\" (1.702 m)   Wt 181 lb (82.1 kg)   LMP 06/21/2019   SpO2 97%   BMI 28.35 kg/m²     General Appearance:  Alert, cooperative, no distress, appears stated age   Head:  Normocephalic, without obvious abnormality, atraumatic   Eyes:  PERRL, conjunctiva/corneas clear, EOM's intact, fundi benign, both eyes   Ears:  Normal TM's and external ear canals, both ears   Nose: Nares normal, septum midline,mucosa normal, no drainage or sinus tenderness   Throat: Lips, mucosa, and tongue normal; teeth and gums normal   Neck: Supple, symmetrical, trachea midline, no adenopathy;  thyroid: not enlarged, symmetric, no tenderness/mass/nodules; no carotid bruit or JVD   Back:   Symmetric, no curvature, ROM normal, no CVA tenderness   Lungs:   Clear to auscultation bilaterally, respirations unlabored   Breasts:  No masses or tenderness   Heart:  Regular rate and rhythm, S1 and S2 normal, no murmur, rub, or gallop   Abdomen:   Soft, non-tender, bowel sounds active all four quadrants,  no masses, no

## 2019-07-15 ENCOUNTER — APPOINTMENT (OUTPATIENT)
Dept: GENERAL RADIOLOGY | Age: 34
DRG: 137 | End: 2019-07-15
Payer: MEDICAID

## 2019-07-15 PROBLEM — R04.2 HEMOPTYSIS: Status: ACTIVE | Noted: 2018-02-15

## 2019-07-15 LAB
C-REACTIVE PROTEIN: 0.76 MG/DL (ref 0–1)
ERYTHROCYTE [DISTWIDTH] IN BLOOD BY AUTOMATED COUNT: 15.4 % (ref 11.5–14.5)
ERYTHROCYTE [DISTWIDTH] IN BLOOD BY AUTOMATED COUNT: 49.1 FL (ref 35–45)
HCT VFR BLD CALC: 34.6 % (ref 37–47)
HEMOGLOBIN: 10.8 GM/DL (ref 12–16)
MCH RBC QN AUTO: 27.2 PG (ref 26–33)
MCHC RBC AUTO-ENTMCNC: 31.2 GM/DL (ref 32.2–35.5)
MCV RBC AUTO: 87.2 FL (ref 81–99)
PLATELET # BLD: 390 THOU/MM3 (ref 130–400)
PMV BLD AUTO: 9.3 FL (ref 9.4–12.4)
RBC # BLD: 3.97 MILL/MM3 (ref 4.2–5.4)
RHEUMATOID FACTOR: < 10 IU/ML (ref 0–13)
SEDIMENTATION RATE, ERYTHROCYTE: 113 MM/HR (ref 0–20)
VANCOMYCIN TROUGH: 6.8 UG/ML (ref 5–15)
WBC # BLD: 7.2 THOU/MM3 (ref 4.8–10.8)

## 2019-07-15 PROCEDURE — 2580000003 HC RX 258: Performed by: PHARMACIST

## 2019-07-15 PROCEDURE — 6360000002 HC RX W HCPCS: Performed by: PHYSICIAN ASSISTANT

## 2019-07-15 PROCEDURE — 2709999900 HC NON-CHARGEABLE SUPPLY

## 2019-07-15 PROCEDURE — 71046 X-RAY EXAM CHEST 2 VIEWS: CPT

## 2019-07-15 PROCEDURE — C1751 CATH, INF, PER/CENT/MIDLINE: HCPCS

## 2019-07-15 PROCEDURE — 36415 COLL VENOUS BLD VENIPUNCTURE: CPT

## 2019-07-15 PROCEDURE — 6360000002 HC RX W HCPCS: Performed by: PHARMACIST

## 2019-07-15 PROCEDURE — 86140 C-REACTIVE PROTEIN: CPT

## 2019-07-15 PROCEDURE — 99255 IP/OBS CONSLTJ NEW/EST HI 80: CPT | Performed by: INTERNAL MEDICINE

## 2019-07-15 PROCEDURE — 1200000000 HC SEMI PRIVATE

## 2019-07-15 PROCEDURE — 2500000003 HC RX 250 WO HCPCS: Performed by: INTERNAL MEDICINE

## 2019-07-15 PROCEDURE — 2580000003 HC RX 258: Performed by: INTERNAL MEDICINE

## 2019-07-15 PROCEDURE — 6370000000 HC RX 637 (ALT 250 FOR IP): Performed by: INTERNAL MEDICINE

## 2019-07-15 PROCEDURE — 99233 SBSQ HOSP IP/OBS HIGH 50: CPT | Performed by: INTERNAL MEDICINE

## 2019-07-15 PROCEDURE — 86038 ANTINUCLEAR ANTIBODIES: CPT

## 2019-07-15 PROCEDURE — 85027 COMPLETE CBC AUTOMATED: CPT

## 2019-07-15 PROCEDURE — 99231 SBSQ HOSP IP/OBS SF/LOW 25: CPT | Performed by: PSYCHIATRY & NEUROLOGY

## 2019-07-15 PROCEDURE — 6370000000 HC RX 637 (ALT 250 FOR IP): Performed by: PSYCHIATRY & NEUROLOGY

## 2019-07-15 PROCEDURE — 6360000002 HC RX W HCPCS: Performed by: PSYCHIATRY & NEUROLOGY

## 2019-07-15 PROCEDURE — 76937 US GUIDE VASCULAR ACCESS: CPT

## 2019-07-15 PROCEDURE — 86200 CCP ANTIBODY: CPT

## 2019-07-15 PROCEDURE — 2580000003 HC RX 258: Performed by: PHYSICIAN ASSISTANT

## 2019-07-15 PROCEDURE — 86431 RHEUMATOID FACTOR QUANT: CPT

## 2019-07-15 PROCEDURE — 80202 ASSAY OF VANCOMYCIN: CPT

## 2019-07-15 PROCEDURE — 85651 RBC SED RATE NONAUTOMATED: CPT

## 2019-07-15 PROCEDURE — APPSS30 APP SPLIT SHARED TIME 16-30 MINUTES: Performed by: PHYSICIAN ASSISTANT

## 2019-07-15 RX ORDER — BUPRENORPHINE 2 MG/1
2 TABLET SUBLINGUAL PRN
Status: DISCONTINUED | OUTPATIENT
Start: 2019-07-15 | End: 2019-07-18 | Stop reason: HOSPADM

## 2019-07-15 RX ORDER — SODIUM CHLORIDE 0.9 % (FLUSH) 0.9 %
10 SYRINGE (ML) INJECTION EVERY 12 HOURS SCHEDULED
Status: DISCONTINUED | OUTPATIENT
Start: 2019-07-15 | End: 2019-07-18 | Stop reason: HOSPADM

## 2019-07-15 RX ORDER — SODIUM CHLORIDE 0.9 % (FLUSH) 0.9 %
10 SYRINGE (ML) INJECTION PRN
Status: DISCONTINUED | OUTPATIENT
Start: 2019-07-15 | End: 2019-07-18 | Stop reason: HOSPADM

## 2019-07-15 RX ORDER — LIDOCAINE HYDROCHLORIDE 10 MG/ML
5 INJECTION, SOLUTION EPIDURAL; INFILTRATION; INTRACAUDAL; PERINEURAL ONCE
Status: DISCONTINUED | OUTPATIENT
Start: 2019-07-15 | End: 2019-07-18 | Stop reason: HOSPADM

## 2019-07-15 RX ORDER — CLONIDINE HYDROCHLORIDE 0.1 MG/1
0.1 TABLET ORAL PRN
Status: DISCONTINUED | OUTPATIENT
Start: 2019-07-15 | End: 2019-07-18 | Stop reason: HOSPADM

## 2019-07-15 RX ADMIN — ACETAMINOPHEN 650 MG: 325 TABLET ORAL at 23:07

## 2019-07-15 RX ADMIN — BUPRENORPHINE HCL 2 MG: 2 TABLET SUBLINGUAL at 15:40

## 2019-07-15 RX ADMIN — HYDROXYZINE PAMOATE 50 MG: 50 CAPSULE ORAL at 09:46

## 2019-07-15 RX ADMIN — VANCOMYCIN HYDROCHLORIDE 1250 MG: 5 INJECTION, POWDER, LYOPHILIZED, FOR SOLUTION INTRAVENOUS at 21:38

## 2019-07-15 RX ADMIN — Medication 5 UNITS: at 22:50

## 2019-07-15 RX ADMIN — ALTEPLASE 6 MG: 2.2 INJECTION, POWDER, LYOPHILIZED, FOR SOLUTION INTRAVENOUS at 09:38

## 2019-07-15 RX ADMIN — BUPRENORPHINE HCL 2 MG: 2 TABLET SUBLINGUAL at 23:08

## 2019-07-15 RX ADMIN — BUPRENORPHINE HCL 2 MG: 2 TABLET SUBLINGUAL at 12:25

## 2019-07-15 RX ADMIN — CLONIDINE HYDROCHLORIDE 0.1 MG: 0.1 TABLET ORAL at 21:39

## 2019-07-15 RX ADMIN — VANCOMYCIN HYDROCHLORIDE 1250 MG: 5 INJECTION, POWDER, LYOPHILIZED, FOR SOLUTION INTRAVENOUS at 16:39

## 2019-07-15 RX ADMIN — CLONIDINE HYDROCHLORIDE 0.1 MG: 0.1 TABLET ORAL at 06:14

## 2019-07-15 RX ADMIN — TRAZODONE HYDROCHLORIDE 50 MG: 50 TABLET ORAL at 20:29

## 2019-07-15 RX ADMIN — Medication 10 ML: at 20:35

## 2019-07-15 RX ADMIN — VANCOMYCIN HYDROCHLORIDE 1000 MG: 1 INJECTION, POWDER, LYOPHILIZED, FOR SOLUTION INTRAVENOUS at 06:24

## 2019-07-15 RX ADMIN — BUPRENORPHINE HCL 2 MG: 2 TABLET SUBLINGUAL at 20:30

## 2019-07-15 RX ADMIN — Medication 2 MG: at 21:39

## 2019-07-15 RX ADMIN — DORNASE ALFA 5 MG: 1 SOLUTION RESPIRATORY (INHALATION) at 09:38

## 2019-07-15 RX ADMIN — BUPRENORPHINE HCL 2 MG: 2 TABLET SUBLINGUAL at 09:46

## 2019-07-15 RX ADMIN — BUPRENORPHINE HCL 2 MG: 2 TABLET SUBLINGUAL at 06:14

## 2019-07-15 ASSESSMENT — PAIN DESCRIPTION - ONSET: ONSET: ON-GOING

## 2019-07-15 ASSESSMENT — PAIN DESCRIPTION - DESCRIPTORS: DESCRIPTORS: ACHING;SHARP

## 2019-07-15 ASSESSMENT — PAIN DESCRIPTION - FREQUENCY: FREQUENCY: CONTINUOUS

## 2019-07-15 ASSESSMENT — PAIN SCALES - GENERAL
PAINLEVEL_OUTOF10: 10
PAINLEVEL_OUTOF10: 10
PAINLEVEL_OUTOF10: 6
PAINLEVEL_OUTOF10: 8
PAINLEVEL_OUTOF10: 10
PAINLEVEL_OUTOF10: 8
PAINLEVEL_OUTOF10: 6
PAINLEVEL_OUTOF10: 5
PAINLEVEL_OUTOF10: 10
PAINLEVEL_OUTOF10: 5
PAINLEVEL_OUTOF10: 9
PAINLEVEL_OUTOF10: 10

## 2019-07-15 ASSESSMENT — PAIN DESCRIPTION - ORIENTATION: ORIENTATION: RIGHT

## 2019-07-15 ASSESSMENT — PAIN DESCRIPTION - PAIN TYPE: TYPE: ACUTE PAIN

## 2019-07-15 ASSESSMENT — PAIN DESCRIPTION - PROGRESSION: CLINICAL_PROGRESSION: NOT CHANGED

## 2019-07-15 ASSESSMENT — PAIN DESCRIPTION - LOCATION: LOCATION: RIB CAGE;BACK

## 2019-07-15 ASSESSMENT — PAIN - FUNCTIONAL ASSESSMENT: PAIN_FUNCTIONAL_ASSESSMENT: PREVENTS OR INTERFERES SOME ACTIVE ACTIVITIES AND ADLS
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
## 2019-07-15 NOTE — PROGRESS NOTES
Pharmacy Vancomycin Consult     Vancomycin Day: 4  Current Dosin,000 mg IV q8h    Temp max:  99    Recent Labs     19  0814   BUN 8       Recent Labs     19  0814   CREATININE 0.4       Recent Labs     19  0814 07/15/19  1251   WBC 5.2 7.2         Intake/Output Summary (Last 24 hours) at 7/15/2019 1344  Last data filed at 2019 1351  Gross per 24 hour   Intake 120 ml   Output 0 ml   Net 120 ml       Cultures/Sensitivities:  Date Source Result   19 Pleural fluid No growth prelim   19 Urine sent   19 Blood X 2  No growth prelim       Ht Readings from Last 1 Encounters:   19 5' 7\" (1.702 m)        Wt Readings from Last 1 Encounters:   19 181 lb (82.1 kg)         Body mass index is 28.35 kg/m². Estimated Creatinine Clearance: 216 mL/min (based on SCr of 0.4 mg/dL). Trough: 6.8    Assessment/Plan:  Subtherapeutic vanc trough, dose was missed yesterday due to loss of IV access. Trough today was prior to 3rd dose of adjusted regimen. Will increase vancomycin dose to 1,250 mg IV q8h. Plan to recheck level prior to 4th dose tomorrow (ordered).      Santy Perez PharmD, BCPS   7/15/2019  1:51 PM

## 2019-07-15 NOTE — CARE COORDINATION
7/15/19, 3:26 PM      [facility]  Location: [facility]13/013 Reason for admit: Loculated pleural effusion [J90]   Procedure: 7/12/19 CT guided drain placement. 7/15/19 TPA and Dornase given today per [facility]  7/15/19 Midline placement. 7/15/19 PPD    Treatment Plan of Care: 7/15/19 patient placed in airborne precautions. [facility], ID, and Psychiatry following, Pulmonology consult, IV Vancomycin per pharmacy dosing, Nicotine patch for smoking cessation, Subutex per COWS scale, Potassium replacement protocol, drain care, up with assistance. PCP: CARLOS Fernandez CNP  Readmission Risk Score: 14%  Discharge Plan: Home with [facility] for drain care.

## 2019-07-15 NOTE — PROGRESS NOTES
of motion in extremities. There is no joint swelling or tenderness. Skin: Skin color, texture, turgor normal.  Track marks on arms bilaterally. Neurologic:  Neurovascularly intact without any focal sensory/motor deficits in the upper and lower extremities. Cranial nerves:  grossly non-focal.  Psychiatric: Alert and oriented, thought content appropriate, pressured speech. Capillary Refill: Brisk,< 3 seconds. Peripheral Pulses: +2 palpable, equal bilaterally. Labs:   Recent Labs     07/14/19  0814   WBC 5.2   HGB 10.0*   HCT 32.4*        Recent Labs     07/14/19  0814      K 4.1      CO2 22*   BUN 8   CREATININE 0.4   CALCIUM 8.9     No results for input(s): AST, ALT, BILIDIR, BILITOT, ALKPHOS in the last 72 hours. No results for input(s): INR in the last 72 hours. No results for input(s): Lynnell Holly in the last 72 hours. Microbiology:      Urinalysis:      Lab Results   Component Value Date    NITRU NEGATIVE 07/12/2019    WBCUA 10-15 07/12/2019    WBCUA 15-25 03/03/2012    BACTERIA NONE 07/12/2019    RBCUA 0-2 07/12/2019    BLOODU NEGATIVE 07/12/2019    SPECGRAV 1.018 08/23/2013    GLUCOSEU NEGATIVE 07/12/2019       Radiology:  XR CHEST STANDARD (2 VW)   Final Result   1. Normal heart size. No pneumothorax. 2. Loculated pleural fluid collection/empyema in the right upper lobe/apical region laterally with drainage catheter in place. This is decreased in size since prior study. 3. There is also a moderate sized loculated pocket of fluid in the region of the right middle lobe and an additional tiny pocket of fluid along the superior/posterior aspect of the major fissure right side. .   4.. Mild atelectasis/pneumonia right mid and lower lung fields. Overall appearance of chest slightly improved from prior. **This report has been created using voice recognition software. It may contain minor errors which are inherent in voice recognition technology. **      Final report

## 2019-07-15 NOTE — PROGRESS NOTES
Hospital day 3  S/P Right Upper Pleural Chest tube    Subjective:     Ms. Randall Spencer reports no major complaints. No fevers, no chills.       Scheduled Meds:   vancomycin  1,000 mg Intravenous Q8H    sodium chloride flush  10 mL Intravenous 2 times per day    [Held by provider] enoxaparin  40 mg Subcutaneous Q24H    nicotine  1 patch Transdermal Daily    vancomycin (VANCOCIN) intermittent dosing (placeholder)   Other RX Placeholder    lidocaine  1 patch Transdermal Daily    cefepime  2 g Intravenous Q12H    melatonin ER  2 mg Oral Nightly     Continuous Infusions:  PRN Meds:sodium chloride flush, potassium chloride **OR** potassium alternative oral replacement **OR** potassium chloride, magnesium hydroxide, ondansetron, acetaminophen, buprenorphine **AND** cloNIDine, dicyclomine, hydrOXYzine, promethazine, traZODone      Objective:      Physical Exam:   /64   Pulse 81   Temp 97.8 °F (36.6 °C) (Oral)   Resp 18   Ht 5' 7\" (1.702 m)   Wt 181 lb (82.1 kg)   LMP 06/21/2019   SpO2 98%   BMI 28.35 kg/m²     General Appearance:  Alert, cooperative, no distress, appears stated age   Head:  Normocephalic, without obvious abnormality, atraumatic   Eyes:  PERRL, conjunctiva/corneas clear, EOM's intact, fundi benign, both eyes   Ears:  Normal TM's and external ear canals, both ears   Nose: Nares normal, septum midline,mucosa normal, no drainage or sinus tenderness   Throat: Lips, mucosa, and tongue normal; teeth and gums normal   Neck: Supple, symmetrical, trachea midline, no adenopathy;  thyroid: not enlarged, symmetric, no tenderness/mass/nodules; no carotid bruit or JVD   Back:   Symmetric, no curvature, ROM normal, no CVA tenderness   Lungs:   Clear to auscultation bilaterally, respirations unlabored   Breasts:  No masses or tenderness   Heart:  Regular rate and rhythm, S1 and S2 normal, no murmur, rub, or gallop   Abdomen:   Soft, non-tender, bowel sounds active all four quadrants,  no masses, no organomegaly   Pelvic: Deferred   Extremities: Extremities normal, atraumatic, no cyanosis or edema   Pulses: 2+ and symmetric   Skin: Skin color, texture, turgor normal, no rashes or lesions   Lymph nodes: Cervical, supraclavicular, and axillary nodes normal   Neurologic: Normal         Cardiographics  ECG: normal sinus rhythm, no blocks or conduction defects, no ischemic changes . Echocardiogram: Patient Status: Routine    Height: 67 inches Weight: 181 pounds BSA: 1.94 m^2 BMI: 28.35 kg/m^2    BP: 103/64 mmHg     Conclusions      Summary   Ejection fraction is visually estimated at 55%.   Overall left ventricular function is normal.      Signature      ----------------------------------------------------------------   Electronically signed by Alka Hays MD (Interpreting   physician) on 07/12/2019 at 04:28 PM   ----------------------------------------------------------------      Findings      Mitral Valve   Mild mitral regurgitation is present.      Aortic Valve   The aortic valve was trileaflet with normal thickness and cuspal   separation. DOPPLER: Transaortic velocity was within the normal range with   no evidence of aortic stenosis.  There was no evidence of aortic   regurgitation.      Tricuspid Valve   Mild tricuspid regurgitation visualized.      Pulmonic Valve   The pulmonic valve was not well visualized .   Trivial pulmonic regurgitation visualized.      Left Atrium   Left atrial size was normal.      Left Ventricle   Ejection fraction is visually estimated at 55%.   Overall left ventricular function is normal.      Right Atrium   Right atrial size was normal.      Right Ventricle   The right ventricular size was normal with normal systolic function and   wall thickness.      Pericardial Effusion   The pericardium was normal in appearance with no evidence of a pericardial   effusion.      Pleural Effusion   No evidence of pleural effusion.      Aorta / Great Vessels   -Aortic root dimension within

## 2019-07-15 NOTE — PROGRESS NOTES
Department of Psychiatry  Consult Service  Progress Note     Reason for Consult: Opioid abuse    SUBJECTIVE:    She notes her withdrawal symptoms are much better today. Continues to report dealing with severe pain. Reports good sleep and appetite.   Denies any suicidal or homicidal ideation plan or intent    OBJECTIVE      Medications  Current Facility-Administered Medications: lidocaine PF 1 % injection 5 mL, 5 mL, Intradermal, Once  sodium chloride flush 0.9 % injection 10 mL, 10 mL, Intravenous, 2 times per day  sodium chloride flush 0.9 % injection 10 mL, 10 mL, Intravenous, PRN  tuberculin injection 5 Units, 5 Units, Intradermal, Once  [START ON 2019] ppd (tuberculin skin test) read, , Does not apply, Once  vancomycin (VANCOCIN) 1,250 mg in dextrose 5 % 250 mL IVPB, 1,250 mg, Intravenous, Q8H  potassium chloride (KLOR-CON M) extended release tablet 40 mEq, 40 mEq, Oral, PRN **OR** potassium bicarb-citric acid (EFFER-K) effervescent tablet 40 mEq, 40 mEq, Oral, PRN **OR** potassium chloride 10 mEq/100 mL IVPB (Peripheral Line), 10 mEq, Intravenous, PRN  magnesium hydroxide (MILK OF MAGNESIA) 400 MG/5ML suspension 30 mL, 30 mL, Oral, Daily PRN  ondansetron (ZOFRAN) injection 4 mg, 4 mg, Intravenous, Q6H PRN  [Held by provider] enoxaparin (LOVENOX) injection 40 mg, 40 mg, Subcutaneous, Q24H  acetaminophen (TYLENOL) tablet 650 mg, 650 mg, Oral, Q4H PRN  nicotine (NICODERM CQ) 21 MG/24HR 1 patch, 1 patch, Transdermal, Daily  vancomycin (VANCOCIN) intermittent dosing (placeholder), , Other, RX Placeholder  lidocaine 4 % external patch 1 patch, 1 patch, Transdermal, Daily  [] buprenorphine (SUBUTEX) SL tablet 2 mg, 2 mg, Sublingual, PRN **AND** cloNIDine (CATAPRES) tablet 0.1 mg, 0.1 mg, Oral, PRN  melatonin ER tablet 2 mg, 2 mg, Oral, Nightly  dicyclomine (BENTYL) tablet 20 mg, 20 mg, Oral, Q6H PRN  hydrOXYzine (VISTARIL) capsule 50 mg, 50 mg, Oral, Q8H PRN  promethazine (PHENERGAN) tablet 25 mg, 25 mg,

## 2019-07-16 LAB
ANION GAP SERPL CALCULATED.3IONS-SCNC: 13 MEQ/L (ref 8–16)
BUN BLDV-MCNC: 10 MG/DL (ref 7–22)
CALCIUM SERPL-MCNC: 9.6 MG/DL (ref 8.5–10.5)
CHLORIDE BLD-SCNC: 102 MEQ/L (ref 98–111)
CO2: 25 MEQ/L (ref 23–33)
CREAT SERPL-MCNC: 0.4 MG/DL (ref 0.4–1.2)
CYTOLOGY-NON GYN: NORMAL
ERYTHROCYTE [DISTWIDTH] IN BLOOD BY AUTOMATED COUNT: 15.7 % (ref 11.5–14.5)
ERYTHROCYTE [DISTWIDTH] IN BLOOD BY AUTOMATED COUNT: 49.5 FL (ref 35–45)
GFR SERPL CREATININE-BSD FRML MDRD: > 90 ML/MIN/1.73M2
GLUCOSE BLD-MCNC: 102 MG/DL (ref 70–108)
HCT VFR BLD CALC: 32.1 % (ref 37–47)
HEMOGLOBIN: 10.1 GM/DL (ref 12–16)
INR BLD: 1.17 (ref 0.85–1.13)
MCH RBC QN AUTO: 27.3 PG (ref 26–33)
MCHC RBC AUTO-ENTMCNC: 31.5 GM/DL (ref 32.2–35.5)
MCV RBC AUTO: 86.8 FL (ref 81–99)
PLATELET # BLD: 333 THOU/MM3 (ref 130–400)
PMV BLD AUTO: 9.4 FL (ref 9.4–12.4)
POTASSIUM SERPL-SCNC: 4.3 MEQ/L (ref 3.5–5.2)
RBC # BLD: 3.7 MILL/MM3 (ref 4.2–5.4)
SODIUM BLD-SCNC: 140 MEQ/L (ref 135–145)
WBC # BLD: 6.9 THOU/MM3 (ref 4.8–10.8)

## 2019-07-16 PROCEDURE — 99152 MOD SED SAME PHYS/QHP 5/>YRS: CPT | Performed by: INTERNAL MEDICINE

## 2019-07-16 PROCEDURE — 2500000003 HC RX 250 WO HCPCS

## 2019-07-16 PROCEDURE — 88312 SPECIAL STAINS GROUP 1: CPT

## 2019-07-16 PROCEDURE — 88112 CYTOPATH CELL ENHANCE TECH: CPT

## 2019-07-16 PROCEDURE — 36592 COLLECT BLOOD FROM PICC: CPT

## 2019-07-16 PROCEDURE — 99232 SBSQ HOSP IP/OBS MODERATE 35: CPT | Performed by: PSYCHIATRY & NEUROLOGY

## 2019-07-16 PROCEDURE — 87081 CULTURE SCREEN ONLY: CPT

## 2019-07-16 PROCEDURE — 87205 SMEAR GRAM STAIN: CPT

## 2019-07-16 PROCEDURE — 80202 ASSAY OF VANCOMYCIN: CPT

## 2019-07-16 PROCEDURE — 1200000000 HC SEMI PRIVATE

## 2019-07-16 PROCEDURE — 85610 PROTHROMBIN TIME: CPT

## 2019-07-16 PROCEDURE — 99232 SBSQ HOSP IP/OBS MODERATE 35: CPT | Performed by: INTERNAL MEDICINE

## 2019-07-16 PROCEDURE — 6360000002 HC RX W HCPCS: Performed by: PSYCHIATRY & NEUROLOGY

## 2019-07-16 PROCEDURE — 2709999900 HC NON-CHARGEABLE SUPPLY: Performed by: INTERNAL MEDICINE

## 2019-07-16 PROCEDURE — 80048 BASIC METABOLIC PNL TOTAL CA: CPT

## 2019-07-16 PROCEDURE — 0B978ZZ DRAINAGE OF LEFT MAIN BRONCHUS, VIA NATURAL OR ARTIFICIAL OPENING ENDOSCOPIC: ICD-10-PCS | Performed by: INTERNAL MEDICINE

## 2019-07-16 PROCEDURE — APPSS30 APP SPLIT SHARED TIME 16-30 MINUTES: Performed by: PHYSICIAN ASSISTANT

## 2019-07-16 PROCEDURE — APPSS30 APP SPLIT SHARED TIME 16-30 MINUTES: Performed by: NURSE PRACTITIONER

## 2019-07-16 PROCEDURE — 88305 TISSUE EXAM BY PATHOLOGIST: CPT

## 2019-07-16 PROCEDURE — 88108 CYTOPATH CONCENTRATE TECH: CPT

## 2019-07-16 PROCEDURE — 0B918ZZ DRAINAGE OF TRACHEA, VIA NATURAL OR ARTIFICIAL OPENING ENDOSCOPIC: ICD-10-PCS | Performed by: INTERNAL MEDICINE

## 2019-07-16 PROCEDURE — 6370000000 HC RX 637 (ALT 250 FOR IP)

## 2019-07-16 PROCEDURE — 2580000003 HC RX 258: Performed by: INTERNAL MEDICINE

## 2019-07-16 PROCEDURE — 6370000000 HC RX 637 (ALT 250 FOR IP): Performed by: PSYCHIATRY & NEUROLOGY

## 2019-07-16 PROCEDURE — 31622 DX BRONCHOSCOPE/WASH: CPT | Performed by: INTERNAL MEDICINE

## 2019-07-16 PROCEDURE — 2580000003 HC RX 258: Performed by: PHARMACIST

## 2019-07-16 PROCEDURE — 87102 FUNGUS ISOLATION CULTURE: CPT

## 2019-07-16 PROCEDURE — 36415 COLL VENOUS BLD VENIPUNCTURE: CPT

## 2019-07-16 PROCEDURE — 87070 CULTURE OTHR SPECIMN AEROBIC: CPT

## 2019-07-16 PROCEDURE — 3609027000 HC BRONCHOSCOPY: Performed by: INTERNAL MEDICINE

## 2019-07-16 PROCEDURE — 85027 COMPLETE CBC AUTOMATED: CPT

## 2019-07-16 PROCEDURE — 87106 FUNGI IDENTIFICATION YEAST: CPT

## 2019-07-16 PROCEDURE — 2709999900 HC NON-CHARGEABLE SUPPLY

## 2019-07-16 PROCEDURE — 6360000002 HC RX W HCPCS: Performed by: INTERNAL MEDICINE

## 2019-07-16 PROCEDURE — 87255 GENET VIRUS ISOLATE HSV: CPT

## 2019-07-16 PROCEDURE — 87116 MYCOBACTERIA CULTURE: CPT

## 2019-07-16 PROCEDURE — 87206 SMEAR FLUORESCENT/ACID STAI: CPT

## 2019-07-16 PROCEDURE — 94640 AIRWAY INHALATION TREATMENT: CPT

## 2019-07-16 PROCEDURE — 99233 SBSQ HOSP IP/OBS HIGH 50: CPT | Performed by: INTERNAL MEDICINE

## 2019-07-16 PROCEDURE — 0B938ZZ DRAINAGE OF RIGHT MAIN BRONCHUS, VIA NATURAL OR ARTIFICIAL OPENING ENDOSCOPIC: ICD-10-PCS | Performed by: INTERNAL MEDICINE

## 2019-07-16 PROCEDURE — 6360000002 HC RX W HCPCS: Performed by: PHARMACIST

## 2019-07-16 RX ORDER — MIDAZOLAM HYDROCHLORIDE 1 MG/ML
INJECTION INTRAMUSCULAR; INTRAVENOUS PRN
Status: DISCONTINUED | OUTPATIENT
Start: 2019-07-16 | End: 2019-07-16 | Stop reason: ALTCHOICE

## 2019-07-16 RX ORDER — SODIUM CHLORIDE 9 MG/ML
INJECTION, SOLUTION INTRAVENOUS CONTINUOUS
Status: DISCONTINUED | OUTPATIENT
Start: 2019-07-16 | End: 2019-07-18 | Stop reason: HOSPADM

## 2019-07-16 RX ORDER — FENTANYL CITRATE 50 UG/ML
INJECTION, SOLUTION INTRAMUSCULAR; INTRAVENOUS PRN
Status: DISCONTINUED | OUTPATIENT
Start: 2019-07-16 | End: 2019-07-16 | Stop reason: ALTCHOICE

## 2019-07-16 RX ORDER — LIDOCAINE HYDROCHLORIDE 40 MG/ML
4 SOLUTION TOPICAL ONCE
Status: DISCONTINUED | OUTPATIENT
Start: 2019-07-16 | End: 2019-07-18 | Stop reason: HOSPADM

## 2019-07-16 RX ADMIN — BUPRENORPHINE HCL 2 MG: 2 TABLET SUBLINGUAL at 10:28

## 2019-07-16 RX ADMIN — VANCOMYCIN HYDROCHLORIDE 1250 MG: 5 INJECTION, POWDER, LYOPHILIZED, FOR SOLUTION INTRAVENOUS at 22:57

## 2019-07-16 RX ADMIN — SODIUM CHLORIDE: 9 INJECTION, SOLUTION INTRAVENOUS at 11:30

## 2019-07-16 RX ADMIN — CLONIDINE HYDROCHLORIDE 0.1 MG: 0.1 TABLET ORAL at 10:28

## 2019-07-16 RX ADMIN — Medication 2 MG: at 20:43

## 2019-07-16 RX ADMIN — TRAZODONE HYDROCHLORIDE 50 MG: 50 TABLET ORAL at 20:43

## 2019-07-16 RX ADMIN — VANCOMYCIN HYDROCHLORIDE 1250 MG: 5 INJECTION, POWDER, LYOPHILIZED, FOR SOLUTION INTRAVENOUS at 05:57

## 2019-07-16 RX ADMIN — VANCOMYCIN HYDROCHLORIDE 1250 MG: 5 INJECTION, POWDER, LYOPHILIZED, FOR SOLUTION INTRAVENOUS at 14:25

## 2019-07-16 RX ADMIN — BUPRENORPHINE HCL 2 MG: 2 TABLET SUBLINGUAL at 05:56

## 2019-07-16 RX ADMIN — BUPRENORPHINE HCL 2 MG: 2 TABLET SUBLINGUAL at 14:19

## 2019-07-16 ASSESSMENT — PAIN - FUNCTIONAL ASSESSMENT: PAIN_FUNCTIONAL_ASSESSMENT: 0-10

## 2019-07-16 ASSESSMENT — PAIN SCALES - GENERAL
PAINLEVEL_OUTOF10: 10
PAINLEVEL_OUTOF10: 0
PAINLEVEL_OUTOF10: 0
PAINLEVEL_OUTOF10: 10
PAINLEVEL_OUTOF10: 0

## 2019-07-16 NOTE — OP NOTE
Bronchoscopy Procedure Note    Patient: Miguelina Montoya  : 1985        Surgeon: Denton Reyes    Operation: Flexible diagnostic fiberoptic bronchoscopy without fluoroscopy. During procedure Bronch washings obtained  from bilateral tracheobronchial tree. Date of Operation: 19    Indication for procedure: Hemoptysis of uncertain etiology. Right side empyema. For airway exam and to obtain lower respiratory tract specimen as requested by Dr. Sunny Gaston from ID service. Pre operative diagnoses:   -Hemoptysis of uncertain etiology. -Active heroin abuser.  -Cavitary left upper lobe pneumonia due to CAP Vs atypical Vs septic emboli Vs other etiologies. -Moderate sized loculated pocket of fluid in the region of the right middle lobe- Most likely due to Para pneumonic with hx of pneumonia last month. -Hx of chronic tobacco smoking.  -Hepatitis C. Pre operative diagnoses:   -Old clotted blood noted in right upper lobe apical segment.  -No active air way bleeding.  -Hemoptysis of uncertain etiology. -Active heroin abuser. Consent: Miguelina Montoya is a 26 y.o. female . The risks, benefits, complications, treatment options and expected outcomes were discussed with the patient. Consent obtained from the patient after explaining the risks and complications of the procedure. The possibilities of reaction to medication, pulmonary aspiration, perforation of a viscus, bleeding, failure to diagnose a condition and creating a complication requiring transfusion or operation were discussed with the patient who freely signed the consent. Anesthesia: Patient was given conscious sedation with Versed 4mg ( 2+1+1) and Fentanyl 25mcg IV      Description of Procedure: The patient was taken to endoscopy suite, identified as Miguelina Montoya and the procedure verified as Flexible Fiberoptic Bronchoscopy. A Time Out was held and the above information confirmed.      After the induction of topical

## 2019-07-16 NOTE — PLAN OF CARE
Problem: Discharge Planning:  Goal: Participates in care planning  Description  Participates in care planning  7/16/2019 0128 by Tom Perez RN  Outcome: Ongoing  Note:   The patient was allowed to remain at the highest level of independent function in anticipation of future discharge. The patient's ADL's were performed by the patient as much as possible and the patient is an active participant in the plan of care. Problem: Pain Control  Goal: Maintain pain level at or below patient's acceptable level (or 5 if patient is unable to determine acceptable level)  7/16/2019 0128 by Tom Perez RN  Outcome: Ongoing  Flowsheets (Taken 7/15/2019 6972)  Patient's Stated Pain Goal: 8  Note:   Pain goal: 3  Patient complained of a pain level of     on the 0-10 pain scale. The patient's pain is controlled/alleviated by rest, relaxation, repositioning, and PRN analgesic medications. Will continue to monitor      Problem: Neurological  Goal: Maximum potential motor/sensory/cognitive function  7/16/2019 0128 by Tom Perez RN  Outcome: Ongoing  Note:   Patient alert and oriented x4. No signs of decline in motor/sensory/cognitive function this shift. Will continue to monitor. Problem: Cardiovascular  Goal: No DVT, peripheral vascular complications  1/93/4341 0128 by Tom Perez RN  Outcome: Ongoing  Note:   No signs of DVT this shift. Will continue to monitor. Problem: Respiratory  Goal: No pulmonary complications  6/89/8939 0128 by Tom Perez RN  Outcome: Ongoing  Note:   Patients lung sounds were clear bilaterally upon auscultation. Patient has a pleurex drain that is draining serosanguinous drainage. Will continue to monitor for complications. Problem: Intellectual/Education/Knowledge Deficit  Goal: Teaching initiated upon admission  7/16/2019 0128 by Tom Perez RN  Outcome: Ongoing  Note:   Education completed. Patient receptive to learning and verbalized understanding.      Problem:

## 2019-07-16 NOTE — PROGRESS NOTES
Tuberculin skin test administered in left forearm on 7/15/19 at 2145. Test should be read between 7/17/19 at 2145 and 7/18/19 at 2145.

## 2019-07-16 NOTE — PROGRESS NOTES
Jose Barban, WBCUA, BACTERIA, NITRU, GLUCOSEU, BILIRUBINUR, UROBILINOGEN, KETUA, LABCAST, LABCASTTY, AMORPHOS in the last 72 hours. Invalid input(s): CRYSTALS. Thoracentesis fluid analysis results. Performed on: 7/12/19  Side: right side of chest .  By IR:Yes      Lab Results   Component Value Date    PHFL 7.22 07/12/2019    COLORU YELLOW 07/12/2019    COLORU DK YELLOW 07/12/2019    LDFL > 2500 07/12/2019    PROTEINFL 5.7 07/12/2019     Lab Results   Component Value Date    PROT 8.0 07/12/2019     CYTOLOGY: No malignant cells seen    Serum LDH: 197  LDH ratio i.e Pleural fluid LDH/ Serum LDH: 2500/197  Total protein ratio i.e Pleural fluid Total protein/ Serum Total protein:  8.0/5.7    Pleural fluid cultures were negative so far. Gram stain of pleural fluid is negative for any organisms. PFTs   None in Epic. Sleep studies   None in Epic    Cultures      Blood Culture #1-No prelim growth  Blood Culture #2-No prelim growth  Respiratory Culture-pending   Strep pneumoniae antigen-Negative  Legionella antigen-Negative  Urine cultures: Growth of Contaminants    Echocardiogram   7/12/2019   Narrative    Transthoracic Echocardiography Report (TTE)   Conclusions      Summary   Ejection fraction is visually estimated at 55%. Overall left ventricular function is normal.      Signature      ----------------------------------------------------------------   Electronically signed by Xiomy Eller MD (Interpreting   physician) on 07/12/2019 at 04:28 PM   ----------------------------------------------------------------       Radiology    CXR  Jul 15, 2019   PROCEDURE: XR CHEST (2 VW)   1. Normal heart size. No pneumothorax. 2. Loculated pleural fluid collection/empyema in the right upper lobe/apical region laterally with drainage catheter in place. This is decreased in size since prior study.    3. There is also a moderate sized loculated pocket of fluid in the region of the right middle lobe and an additional tiny pocket of fluid along the superior/posterior aspect of the major fissure right side. .   4. Mild atelectasis/pneumonia right mid and lower lung fields. Overall appearance of chest slightly improved from prior. CT Scans  (See actual reports for details)  Jul 12, 2019   PROCEDURE: CT CHEST W CONTRAST   Multiloculated right pleural effusion as discussed above. 1 cm left upper lobe juxtapleural cavitary nodule. No additional nodules. Multifocal atelectasis in the right lung with possible areas of mild or resolving pneumonia in the right upper and middle lobes. Right lower lobe atelectasis and minimal left lower lobe atelectasis or scarring. Mild mediastinal and right hilar adenopathy. Fatty liver. 2 mm nonobstructing mid left renal calculus. Moderately dilated common duct. Assessment   -Hemoptysis of uncertain etiology. Differential includes due to tPA administration Vs other etiologies including pulmonary tuberculosis- less likely.  -Active heroin abuser.  -Cavitary left upper lobe pneumonia due to CAP Vs atypical Vs septic emboli Vs other etiologies. -Anxiety and depression.  -Moderate sized loculated pocket of fluid in the region of the right middle lobe- Most likely due to Para pneumonic with hx of pneumonia last month. -Hx of chronic tobacco smoking.  -Hepatitis C. Plan   -Follow sputum cultures and gram stain.  -Continue current antibiotics per ID service with Dr. Luz Maria Bentley.  -Will send sputum for acid fast bacilli smear and cultures X 3. At least one sputum specimen must be from early morning one.  -Will keep patient in Respiratory isolation ( Negative pressure isolation). -PPD test with control to be read 7/17  -Scheduled for bronchoscopy today to further evaluate source of hemoptysis and obtain BAL   -Titrate Oxygen to keep Spo2 >90%. -Will schedule patient for full pulmonary function tests before clinic visit as out patient.   -Management of loculated right side pleural

## 2019-07-16 NOTE — PRE SEDATION
4 mg Intravenous Q6H PRN Brian Lime, DO        [Held by provider] enoxaparin (LOVENOX) injection 40 mg  40 mg Subcutaneous Q24H Brian Lime, DO   Stopped at 07/12/19 0731    acetaminophen (TYLENOL) tablet 650 mg  650 mg Oral Q4H PRN Brian Lime, DO   650 mg at 07/15/19 2307    nicotine (NICODERM CQ) 21 MG/24HR 1 patch  1 patch Transdermal Daily Brian Lime, DO   1 patch at 07/13/19 1133    vancomycin (VANCOCIN) intermittent dosing (placeholder)   Other RX Placeholder Brian Lime, DO        lidocaine 4 % external patch 1 patch  1 patch Transdermal Daily Shaye Yip, DO   1 patch at 07/13/19 1134    melatonin ER tablet 2 mg  2 mg Oral Nightly Paulie Yun MD   2 mg at 07/15/19 2139    dicyclomine (BENTYL) tablet 20 mg  20 mg Oral Q6H PRN Paulie Yun MD   20 mg at 07/14/19 0210    hydrOXYzine (VISTARIL) capsule 50 mg  50 mg Oral Q8H PRN Paulie Yun MD   50 mg at 07/15/19 0946    promethazine (PHENERGAN) tablet 25 mg  25 mg Oral Q6H PRN Paulie Yun MD        traZODone (DESYREL) tablet 50 mg  50 mg Oral Nightly PRN Paulie Yun MD   50 mg at 07/15/19 2029         Coumadin Use Last 7 Days:  no  Antiplatelet drug therapy use last 7 days: no  Other anticoagulant use last 7 days: no       Pre-Sedation Documentation and Exam:   I have personally completed a history, physical exam & review of systems for this patient (see notes). Mallampati Airway Assessment:  Mallampati Class II - (soft palate, fauces & uvula are visible). Prior History of Anesthesia Complications:   none    ASA Classification:  Class 2 - A normal healthy patient with mild systemic disease. Sedation/ Anesthesia Plan:   intravenous sedation    Medications Planned:   Versed 4 mg IV. Fentanyl 25 mcg IV.     Patient is an appropriate candidate for plan of sedation: yes    Tobin Sy MD, [facility]  7/16/2019, 5:46 PM

## 2019-07-17 ENCOUNTER — APPOINTMENT (OUTPATIENT)
Dept: GENERAL RADIOLOGY | Age: 34
DRG: 137 | End: 2019-07-17
Payer: MEDICAID

## 2019-07-17 VITALS
RESPIRATION RATE: 18 BRPM | SYSTOLIC BLOOD PRESSURE: 99 MMHG | DIASTOLIC BLOOD PRESSURE: 60 MMHG | BODY MASS INDEX: 28.41 KG/M2 | TEMPERATURE: 98.6 F | OXYGEN SATURATION: 95 % | HEIGHT: 67 IN | WEIGHT: 181 LBS | HEART RATE: 81 BPM

## 2019-07-17 LAB
ALBUMIN SERPL-MCNC: 2.8 G/DL (ref 3.5–5.1)
ALP BLD-CCNC: 122 U/L (ref 38–126)
ALT SERPL-CCNC: 14 U/L (ref 11–66)
ANA SCREEN: NORMAL
ANAEROBIC CULTURE: NORMAL
ANION GAP SERPL CALCULATED.3IONS-SCNC: 11 MEQ/L (ref 8–16)
AST SERPL-CCNC: 23 U/L (ref 5–40)
BILIRUB SERPL-MCNC: 0.3 MG/DL (ref 0.3–1.2)
BLOOD CULTURE, ROUTINE: NORMAL
BLOOD CULTURE, ROUTINE: NORMAL
BODY FLUID CULTURE, STERILE: NORMAL
BUN BLDV-MCNC: 8 MG/DL (ref 7–22)
CALCIUM SERPL-MCNC: 8.9 MG/DL (ref 8.5–10.5)
CHLORIDE BLD-SCNC: 106 MEQ/L (ref 98–111)
CO2: 24 MEQ/L (ref 23–33)
CREAT SERPL-MCNC: 0.4 MG/DL (ref 0.4–1.2)
CYCLIC CITRULLIN PEPTIDE AB: 5 UNITS (ref 0–19)
ERYTHROCYTE [DISTWIDTH] IN BLOOD BY AUTOMATED COUNT: 15.7 % (ref 11.5–14.5)
ERYTHROCYTE [DISTWIDTH] IN BLOOD BY AUTOMATED COUNT: 48.4 FL (ref 35–45)
GFR SERPL CREATININE-BSD FRML MDRD: > 90 ML/MIN/1.73M2
GLUCOSE BLD-MCNC: 102 MG/DL (ref 70–108)
GRAM STAIN RESULT: NORMAL
HCT VFR BLD CALC: 30.2 % (ref 37–47)
HEMOGLOBIN: 9.8 GM/DL (ref 12–16)
MCH RBC QN AUTO: 27.6 PG (ref 26–33)
MCHC RBC AUTO-ENTMCNC: 32.5 GM/DL (ref 32.2–35.5)
MCV RBC AUTO: 85.1 FL (ref 81–99)
PLATELET # BLD: 297 THOU/MM3 (ref 130–400)
PMV BLD AUTO: 9.3 FL (ref 9.4–12.4)
POTASSIUM SERPL-SCNC: 4 MEQ/L (ref 3.5–5.2)
RBC # BLD: 3.55 MILL/MM3 (ref 4.2–5.4)
SODIUM BLD-SCNC: 141 MEQ/L (ref 135–145)
TOTAL PROTEIN: 7 G/DL (ref 6.1–8)
VANCOMYCIN TROUGH: 18.1 UG/ML (ref 5–15)
WBC # BLD: 5.3 THOU/MM3 (ref 4.8–10.8)

## 2019-07-17 PROCEDURE — 99232 SBSQ HOSP IP/OBS MODERATE 35: CPT | Performed by: THORACIC SURGERY (CARDIOTHORACIC VASCULAR SURGERY)

## 2019-07-17 PROCEDURE — 99232 SBSQ HOSP IP/OBS MODERATE 35: CPT | Performed by: PSYCHIATRY & NEUROLOGY

## 2019-07-17 PROCEDURE — 80053 COMPREHEN METABOLIC PANEL: CPT

## 2019-07-17 PROCEDURE — 99999 PR OFFICE/OUTPT VISIT,PROCEDURE ONLY: CPT | Performed by: INTERNAL MEDICINE

## 2019-07-17 PROCEDURE — 85027 COMPLETE CBC AUTOMATED: CPT

## 2019-07-17 PROCEDURE — 99232 SBSQ HOSP IP/OBS MODERATE 35: CPT | Performed by: INTERNAL MEDICINE

## 2019-07-17 PROCEDURE — 71046 X-RAY EXAM CHEST 2 VIEWS: CPT

## 2019-07-17 PROCEDURE — 2709999900 HC NON-CHARGEABLE SUPPLY

## 2019-07-17 PROCEDURE — 6360000002 HC RX W HCPCS: Performed by: PHARMACIST

## 2019-07-17 PROCEDURE — APPSS30 APP SPLIT SHARED TIME 16-30 MINUTES: Performed by: PHYSICIAN ASSISTANT

## 2019-07-17 PROCEDURE — 36415 COLL VENOUS BLD VENIPUNCTURE: CPT

## 2019-07-17 PROCEDURE — 2580000003 HC RX 258: Performed by: PHARMACIST

## 2019-07-17 PROCEDURE — 2580000003 HC RX 258: Performed by: INTERNAL MEDICINE

## 2019-07-17 RX ADMIN — SODIUM CHLORIDE: 9 INJECTION, SOLUTION INTRAVENOUS at 16:11

## 2019-07-17 RX ADMIN — VANCOMYCIN HYDROCHLORIDE 1250 MG: 5 INJECTION, POWDER, LYOPHILIZED, FOR SOLUTION INTRAVENOUS at 16:11

## 2019-07-17 RX ADMIN — VANCOMYCIN HYDROCHLORIDE 1250 MG: 5 INJECTION, POWDER, LYOPHILIZED, FOR SOLUTION INTRAVENOUS at 05:53

## 2019-07-17 ASSESSMENT — PAIN DESCRIPTION - PROGRESSION
CLINICAL_PROGRESSION: NOT CHANGED
CLINICAL_PROGRESSION: GRADUALLY WORSENING

## 2019-07-17 ASSESSMENT — PAIN DESCRIPTION - PAIN TYPE: TYPE: ACUTE PAIN

## 2019-07-17 ASSESSMENT — PAIN DESCRIPTION - DIRECTION: RADIATING_TOWARDS: RIGHT SIDE

## 2019-07-17 ASSESSMENT — PAIN DESCRIPTION - FREQUENCY: FREQUENCY: CONTINUOUS

## 2019-07-17 ASSESSMENT — PAIN SCALES - GENERAL: PAINLEVEL_OUTOF10: 8

## 2019-07-17 ASSESSMENT — PAIN DESCRIPTION - LOCATION: LOCATION: BACK;RIB CAGE

## 2019-07-17 ASSESSMENT — PAIN DESCRIPTION - DESCRIPTORS: DESCRIPTORS: ACHING;SHARP

## 2019-07-17 ASSESSMENT — PAIN - FUNCTIONAL ASSESSMENT: PAIN_FUNCTIONAL_ASSESSMENT: PREVENTS OR INTERFERES SOME ACTIVE ACTIVITIES AND ADLS

## 2019-07-17 ASSESSMENT — PAIN DESCRIPTION - ORIENTATION: ORIENTATION: RIGHT

## 2019-07-17 ASSESSMENT — PAIN DESCRIPTION - ONSET: ONSET: ON-GOING
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
## 2019-07-17 NOTE — PROGRESS NOTES
workup  · Check ANGELLA, CCP, PPD-pending  · Elevated ESR, normal CRP, normal RF  · Symptom control with non-opioid analgesia within confines of multiple allergies  · Hemoptysis - resolved; ddx is related to pneumonia vs from recent TPA administration. Will monitor Hgb, see above for workup. Pulm consulted. Put in airborn precautions until TB ruled out, especially since history of IVDU and incarceration. Status post bronchoscopy on 7/17   · left lung apical cavitation/nodule - see above workup. Suspect related to pneumonia, since this was not present on 6/2019 CXR, making TB less likely. -Status post bronchoscopy on 7/1753-pbtkca-st cultures status post bronchoscopy on 7/17  · Hx of IVDU/Heroin Abuse - Consult to Psychiatry for assistance with WD (patient wants to start treatment). Negative HIV Ab.  SW consult  · Started on subutex 7/13, avoid opiate analgesia  · follow COWS protocol for symptom management  · Hx of HCV - not treated, due to ongoing use of IVD, monitor lfts periodically. HIV negative  · Normocytic anemia - suspect due to inflammation from HCV/empyema. · anemia labs -> indicative of chronic inflammation. · Monitor CBC- stable. · Anxiety and Depression - Psychiatry to address, not on any medications as OP. · Constipation - likely opioid induced. Will start on bowel regimen, monitor. Plans for chest tube removal today  Expected discharge date:  ? Disposition: Pending         [] Home       [] TCU       [] Rehab       [] Psych       [] SNF       [] [facility]       [] Other-    --------------------------------------------    Chief Complaint: Chest/rib pain    Hospital Course: Patient s a 27yo F with PMHx of opioid abuse (IV heroin), hep C, bipolar/anxiety/depression (as listed in chart), anemia who presents with right chest pain. On arrival, found to have large right loculated pleural effusion. ID consulted, s/p IR guided pigtail catheter placement and drainage 7/12. States she has been going through pain for about 3 weeks, and has poor living conditions. Has been using about 2 grams of heroin IV daily. Last use was 7/11 prior to arrival.  Patient also endorses depression, and has been using more frequently. Subjective (past 24 hours): Patient seen at bedside,has had no more episodes of hemoptysis since yesterday. Feelling better today, and symptoms of withdrawal are well controlled. Denies any nausea, vomiting, diarrhea, diaphoresis. Medications:  Reviewed    Infusion Medications    sodium chloride 75 mL/hr at 07/17/19 1611     Scheduled Medications    lidocaine  4 mL Nasal Once    lidocaine 1 % injection  5 mL Intradermal Once    sodium chloride flush  10 mL Intravenous 2 times per day    ppd   Does not apply Once    vancomycin  1,250 mg Intravenous Q8H    [Held by provider] enoxaparin  40 mg Subcutaneous Q24H    nicotine  1 patch Transdermal Daily    vancomycin (VANCOCIN) intermittent dosing (placeholder)   Other RX Placeholder    lidocaine  1 patch Transdermal Daily    melatonin ER  2 mg Oral Nightly     PRN Meds: sodium chloride flush, buprenorphine, cloNIDine, potassium chloride **OR** potassium alternative oral replacement **OR** potassium chloride, magnesium hydroxide, ondansetron, acetaminophen, dicyclomine, hydrOXYzine, promethazine, traZODone      Intake/Output Summary (Last 24 hours) at 7/17/2019 1805  Last data filed at 7/17/2019 1351  Gross per 24 hour   Intake 2460.3 ml   Output 0 ml   Net 2460.3 ml     Weight change:       Exam:  BP 99/60   Pulse 81   Temp 98.6 °F (37 °C) (Oral)   Resp 18   Ht 5' 7\" (1.702 m)   Wt 181 lb (82.1 kg)   LMP 06/21/2019   SpO2 95%   BMI 28.35 kg/m²     General appearance: More interactive today, on room air  Eyes:  Pupils equal, round, and reactive to light. Conjunctivae/corneas clear. HENT: Head normal in appearance. External nares normal.  Oral mucosa moist without lesions.   Hearing grossly intact. Neck: Supple, with full range of motion. No jugular venous distention. Trachea midline. Respiratory:  Normal respiratory effort. Decreased sounds in right base, right catheter in place in posterior thorax draining serosanguinous fluid. Cardiovascular: Normal rate, regular rhythm with normal S1/S2 without murmurs. No lower extremity edema. Abdomen: Soft, non-tender, non-distended with normal bowel sounds. Musculoskeletal: Normal range of motion in extremities. There is no joint swelling or tenderness. Skin: Skin color, texture, turgor normal.  Track marks on arms bilaterally. Neurologic:  Neurovascularly intact without any focal sensory/motor deficits in the upper and lower extremities. Cranial nerves:  grossly non-focal.  Psychiatric: Alert and oriented, thought content appropriate, pressured speech. Capillary Refill: Brisk,< 3 seconds. Peripheral Pulses: +2 palpable, equal bilaterally. Labs:   Recent Labs     07/15/19  1251 07/16/19  0600 07/17/19  0600   WBC 7.2 6.9 5.3   HGB 10.8* 10.1* 9.8*   HCT 34.6* 32.1* 30.2*    333 297     Recent Labs     07/16/19  0600 07/17/19  0600    141   K 4.3 4.0    106   CO2 25 24   BUN 10 8   CREATININE 0.4 0.4   CALCIUM 9.6 8.9     Recent Labs     07/17/19  0600   AST 23   ALT 14   BILITOT 0.3   ALKPHOS 122     Recent Labs     07/16/19  1030   INR 1.17*     No results for input(s): Susi Cam in the last 72 hours. Microbiology:      Urinalysis:      Lab Results   Component Value Date    NITRU NEGATIVE 07/12/2019    WBCUA 10-15 07/12/2019    WBCUA 15-25 03/03/2012    BACTERIA NONE 07/12/2019    RBCUA 0-2 07/12/2019    BLOODU NEGATIVE 07/12/2019    SPECGRAV 1.018 08/23/2013    GLUCOSEU NEGATIVE 07/12/2019       Radiology:  XR CHEST STANDARD (2 VW)   Final Result      1. Reduction in size of right apical loculated pleural effusion and interval removal of pigtail drainage catheter.  There are persistent foci of opacity in the right midlung which may represent additional areas of loculated pleural effusion. These areas    have also reduced in size. 2. Right mid and lower lobe opacities which are similar to the prior exam and may be related to atelectasis/infiltrate. **This report has been created using voice recognition software. It may contain minor errors which are inherent in voice recognition technology. **      Final report electronically signed by Dr Charmaine Galicia on 7/17/2019 10:20 AM      RADIOLOGY REPORT   Final Result      XR CHEST STANDARD (2 VW)   Final Result   1. Normal heart size. No pneumothorax. 2. Loculated pleural fluid collection/empyema in the right upper lobe/apical region laterally with drainage catheter in place. This is decreased in size since prior study. 3. There is also a moderate sized loculated pocket of fluid in the region of the right middle lobe and an additional tiny pocket of fluid along the superior/posterior aspect of the major fissure right side. .   4.. Mild atelectasis/pneumonia right mid and lower lung fields. Overall appearance of chest slightly improved from prior. **This report has been created using voice recognition software. It may contain minor errors which are inherent in voice recognition technology. **      Final report electronically signed by Dr. Brandyn Orr on 7/15/2019 8:01 AM      CT GUIDED CHEST TUBE   Final Result    Successful CT-guided pleural drainage catheter placement in a right pleural collection. **This report has been created using voice recognition software. It may contain minor errors which are inherent in voice recognition technology. **      Final report electronically signed by Dr. Merril Sacks, MD on 7/12/2019 4:35 PM      CT GUIDED NEEDLE PLACEMENT   Final Result    Successful CT-guided pleural drainage catheter placement in a right pleural collection.                **This report has been created using voice tbd    Diet:   DIET GENERAL;    Fluids: yes - while NPO    Tele:   [x] yes             [] no      Electronically signed by Kaitlin Aguilar MD on 7/17/2019 at 6:05 PM

## 2019-07-17 NOTE — PROGRESS NOTES
Patient very upset due to nurse supervisor requesting the patient's visitor to wear a mask due to TB precautions. Refuses to have alarm on. Patient requests to have midline removed and to leave AMA.

## 2019-07-18 LAB
GRAM STAIN RESULT: NORMAL
RESPIRATORY CULTURE: NORMAL

## 2019-07-18 NOTE — CARE COORDINATION
7/18/19, 8:00 AM    DISCHARGE BARRIERS    SW consult not completed as patient was in airborne isolation and then signed out AMA.

## 2019-07-19 ENCOUNTER — TELEPHONE (OUTPATIENT)
Dept: INTERNAL MEDICINE CLINIC | Age: 34
End: 2019-07-19

## 2019-07-19 LAB — MISC. #1 REFERENCE GROUP TEST: NORMAL

## 2019-07-23 ENCOUNTER — TELEPHONE (OUTPATIENT)
Dept: PULMONOLOGY | Age: 34
End: 2019-07-23

## 2019-07-23 LAB
FUNGUS SMEAR: ABNORMAL
ORGANISM: ABNORMAL
VIRAL CULTURE,RAPID,RESPIRATOR: NORMAL

## 2019-07-24 ENCOUNTER — TELEPHONE (OUTPATIENT)
Dept: INFECTIOUS DISEASES | Age: 34
End: 2019-07-24

## 2019-07-27 LAB — LEGIONELLA SPECIES CULTURE: NORMAL

## 2019-07-29 ENCOUNTER — TELEPHONE (OUTPATIENT)
Dept: PULMONOLOGY | Age: 34
End: 2019-07-29

## 2019-07-30 NOTE — TELEPHONE ENCOUNTER
Noted patient non-compliant with Medical advised signed out of hospital AMA cancelled appointment at [facility] 2 x's can reschedule with me or Dr. Willow Santos if contacts office.

## 2019-08-12 LAB
FUNGUS IDENTIFIED: NORMAL
FUNGUS SMEAR: NORMAL

## 2019-08-26 LAB — AFB CULTURE & SMEAR: NORMAL

## 2019-09-02 LAB — AFB CULTURE & SMEAR: NORMAL

## 2020-07-05 ENCOUNTER — APPOINTMENT (OUTPATIENT)
Dept: CT IMAGING | Age: 35
End: 2020-07-05
Payer: MEDICAID

## 2020-07-05 ENCOUNTER — HOSPITAL ENCOUNTER (EMERGENCY)
Age: 35
Discharge: HOME | End: 2020-07-05
Payer: MEDICAID

## 2020-07-05 VITALS
WEIGHT: 170 LBS | SYSTOLIC BLOOD PRESSURE: 145 MMHG | OXYGEN SATURATION: 97 % | HEIGHT: 66 IN | HEART RATE: 68 BPM | RESPIRATION RATE: 18 BRPM | TEMPERATURE: 98.7 F | BODY MASS INDEX: 27.32 KG/M2 | DIASTOLIC BLOOD PRESSURE: 88 MMHG

## 2020-07-05 LAB
ALBUMIN SERPL-MCNC: 3.7 G/DL (ref 3.5–5.1)
ALP BLD-CCNC: 110 U/L (ref 38–126)
ALT SERPL-CCNC: 25 U/L (ref 11–66)
AMORPHOUS: ABNORMAL
AMPHETAMINE+METHAMPHETAMINE URINE SCREEN: POSITIVE
ANION GAP SERPL CALCULATED.3IONS-SCNC: 10 MEQ/L (ref 8–16)
AST SERPL-CCNC: 24 U/L (ref 5–40)
BACTERIA: ABNORMAL /HPF
BARBITURATE QUANTITATIVE URINE: NEGATIVE
BASOPHILS # BLD: 0.5 %
BASOPHILS ABSOLUTE: 0 THOU/MM3 (ref 0–0.1)
BENZODIAZEPINE QUANTITATIVE URINE: POSITIVE
BILIRUB SERPL-MCNC: 0.4 MG/DL (ref 0.3–1.2)
BILIRUBIN URINE: NEGATIVE
BLOOD, URINE: NEGATIVE
BUN BLDV-MCNC: 13 MG/DL (ref 7–22)
CALCIUM SERPL-MCNC: 8.8 MG/DL (ref 8.5–10.5)
CANNABINOID QUANTITATIVE URINE: NEGATIVE
CASTS 2: ABNORMAL /LPF
CASTS UA: ABNORMAL /LPF
CHARACTER, URINE: ABNORMAL
CHLORIDE BLD-SCNC: 107 MEQ/L (ref 98–111)
CO2: 20 MEQ/L (ref 23–33)
COCAINE METABOLITE QUANTITATIVE URINE: POSITIVE
COLOR: ABNORMAL
CREAT SERPL-MCNC: 0.4 MG/DL (ref 0.4–1.2)
CRYSTALS, UA: ABNORMAL
EOSINOPHIL # BLD: 6.3 %
EOSINOPHILS ABSOLUTE: 0.5 THOU/MM3 (ref 0–0.4)
EPITHELIAL CELLS, UA: ABNORMAL /HPF
ERYTHROCYTE [DISTWIDTH] IN BLOOD BY AUTOMATED COUNT: 15.4 % (ref 11.5–14.5)
ERYTHROCYTE [DISTWIDTH] IN BLOOD BY AUTOMATED COUNT: 47.4 FL (ref 35–45)
ETHYL ALCOHOL, SERUM: < 0.01 %
GFR SERPL CREATININE-BSD FRML MDRD: > 90 ML/MIN/1.73M2
GLUCOSE BLD-MCNC: 145 MG/DL (ref 70–108)
GLUCOSE URINE: NEGATIVE MG/DL
HCT VFR BLD CALC: 39 % (ref 37–47)
HEMOGLOBIN: 13.4 GM/DL (ref 12–16)
IMMATURE GRANS (ABS): 0.03 THOU/MM3 (ref 0–0.07)
IMMATURE GRANULOCYTES: 0.4 %
KETONES, URINE: NEGATIVE
LACTIC ACID: 0.9 MMOL/L (ref 0.5–2.2)
LEUKOCYTE ESTERASE, URINE: ABNORMAL
LYMPHOCYTES # BLD: 36 %
LYMPHOCYTES ABSOLUTE: 2.7 THOU/MM3 (ref 1–4.8)
MCH RBC QN AUTO: 29.6 PG (ref 26–33)
MCHC RBC AUTO-ENTMCNC: 34.4 GM/DL (ref 32.2–35.5)
MCV RBC AUTO: 86.1 FL (ref 81–99)
MISCELLANEOUS 2: ABNORMAL
MONOCYTES # BLD: 7.6 %
MONOCYTES ABSOLUTE: 0.6 THOU/MM3 (ref 0.4–1.3)
NITRITE, URINE: NEGATIVE
NUCLEATED RED BLOOD CELLS: 0 /100 WBC
OPIATES, URINE: POSITIVE
OSMOLALITY CALCULATION: 276.5 MOSMOL/KG (ref 275–300)
OXYCODONE: NEGATIVE
PH UA: 5 (ref 5–9)
PHENCYCLIDINE QUANTITATIVE URINE: NEGATIVE
PLATELET # BLD: 199 THOU/MM3 (ref 130–400)
PMV BLD AUTO: 10.1 FL (ref 9.4–12.4)
POTASSIUM SERPL-SCNC: 3.9 MEQ/L (ref 3.5–5.2)
PREGNANCY, SERUM: NEGATIVE
PROTEIN UA: 30
RBC # BLD: 4.53 MILL/MM3 (ref 4.2–5.4)
RBC URINE: ABNORMAL /HPF
REASON FOR REJECTION: NORMAL
REJECTED TEST: NORMAL
RENAL EPITHELIAL, UA: ABNORMAL
SEG NEUTROPHILS: 49.2 %
SEGMENTED NEUTROPHILS ABSOLUTE COUNT: 3.7 THOU/MM3 (ref 1.8–7.7)
SODIUM BLD-SCNC: 137 MEQ/L (ref 135–145)
SPECIFIC GRAVITY, URINE: > 1.03 (ref 1–1.03)
TOTAL PROTEIN: 6.5 G/DL (ref 6.1–8)
UROBILINOGEN, URINE: 1 EU/DL (ref 0–1)
WBC # BLD: 7.5 THOU/MM3 (ref 4.8–10.8)
WBC UA: ABNORMAL /HPF
YEAST: ABNORMAL

## 2020-07-05 PROCEDURE — 83605 ASSAY OF LACTIC ACID: CPT

## 2020-07-05 PROCEDURE — G0480 DRUG TEST DEF 1-7 CLASSES: HCPCS

## 2020-07-05 PROCEDURE — 36415 COLL VENOUS BLD VENIPUNCTURE: CPT

## 2020-07-05 PROCEDURE — 99285 EMERGENCY DEPT VISIT HI MDM: CPT

## 2020-07-05 PROCEDURE — 87086 URINE CULTURE/COLONY COUNT: CPT

## 2020-07-05 PROCEDURE — 80307 DRUG TEST PRSMV CHEM ANLYZR: CPT

## 2020-07-05 PROCEDURE — 6360000002 HC RX W HCPCS: Performed by: NURSE PRACTITIONER

## 2020-07-05 PROCEDURE — 96375 TX/PRO/DX INJ NEW DRUG ADDON: CPT

## 2020-07-05 PROCEDURE — 2500000003 HC RX 250 WO HCPCS: Performed by: NURSE PRACTITIONER

## 2020-07-05 PROCEDURE — 74177 CT ABD & PELVIS W/CONTRAST: CPT

## 2020-07-05 PROCEDURE — 80053 COMPREHEN METABOLIC PANEL: CPT

## 2020-07-05 PROCEDURE — 85025 COMPLETE CBC W/AUTO DIFF WBC: CPT

## 2020-07-05 PROCEDURE — 6370000000 HC RX 637 (ALT 250 FOR IP): Performed by: NURSE PRACTITIONER

## 2020-07-05 PROCEDURE — 2580000003 HC RX 258: Performed by: NURSE PRACTITIONER

## 2020-07-05 PROCEDURE — 81001 URINALYSIS AUTO W/SCOPE: CPT

## 2020-07-05 PROCEDURE — 6360000004 HC RX CONTRAST MEDICATION: Performed by: NURSE PRACTITIONER

## 2020-07-05 PROCEDURE — 84703 CHORIONIC GONADOTROPIN ASSAY: CPT

## 2020-07-05 PROCEDURE — 96365 THER/PROPH/DIAG IV INF INIT: CPT

## 2020-07-05 RX ORDER — DIPHENHYDRAMINE HYDROCHLORIDE 50 MG/ML
25 INJECTION INTRAMUSCULAR; INTRAVENOUS ONCE
Status: COMPLETED | OUTPATIENT
Start: 2020-07-05 | End: 2020-07-05

## 2020-07-05 RX ORDER — KETOROLAC TROMETHAMINE 30 MG/ML
30 INJECTION, SOLUTION INTRAMUSCULAR; INTRAVENOUS ONCE
Status: COMPLETED | OUTPATIENT
Start: 2020-07-05 | End: 2020-07-05

## 2020-07-05 RX ORDER — TAMSULOSIN HYDROCHLORIDE 0.4 MG/1
0.4 CAPSULE ORAL DAILY
Qty: 5 CAPSULE | Refills: 0 | Status: SHIPPED | OUTPATIENT
Start: 2020-07-05 | End: 2020-07-10

## 2020-07-05 RX ORDER — TAMSULOSIN HYDROCHLORIDE 0.4 MG/1
0.4 CAPSULE ORAL ONCE
Status: COMPLETED | OUTPATIENT
Start: 2020-07-05 | End: 2020-07-05

## 2020-07-05 RX ORDER — KETOROLAC TROMETHAMINE 10 MG/1
10 TABLET, FILM COATED ORAL EVERY 6 HOURS PRN
Qty: 20 TABLET | Refills: 0 | Status: SHIPPED | OUTPATIENT
Start: 2020-07-05

## 2020-07-05 RX ORDER — HALOPERIDOL 5 MG/ML
2.5 INJECTION INTRAMUSCULAR ONCE
Status: COMPLETED | OUTPATIENT
Start: 2020-07-05 | End: 2020-07-05

## 2020-07-05 RX ADMIN — TAMSULOSIN HYDROCHLORIDE 0.4 MG: 0.4 CAPSULE ORAL at 04:54

## 2020-07-05 RX ADMIN — KETOROLAC TROMETHAMINE 30 MG: 30 INJECTION, SOLUTION INTRAMUSCULAR at 04:54

## 2020-07-05 RX ADMIN — IOPAMIDOL 80 ML: 755 INJECTION, SOLUTION INTRAVENOUS at 04:11

## 2020-07-05 RX ADMIN — DIPHENHYDRAMINE HYDROCHLORIDE 25 MG: 50 INJECTION INTRAMUSCULAR; INTRAVENOUS at 04:54

## 2020-07-05 RX ADMIN — Medication 23.1 MG: at 03:03

## 2020-07-05 RX ADMIN — HALOPERIDOL LACTATE 2.5 MG: 5 INJECTION, SOLUTION INTRAMUSCULAR at 02:04

## 2020-07-05 ASSESSMENT — PAIN SCALES - GENERAL
PAINLEVEL_OUTOF10: 6
PAINLEVEL_OUTOF10: 10
PAINLEVEL_OUTOF10: 10

## 2020-07-05 ASSESSMENT — PAIN DESCRIPTION - ORIENTATION: ORIENTATION: LOWER

## 2020-07-05 ASSESSMENT — PAIN DESCRIPTION - PAIN TYPE
TYPE: ACUTE PAIN
TYPE: ACUTE PAIN

## 2020-07-05 ASSESSMENT — PAIN DESCRIPTION - LOCATION: LOCATION: ABDOMEN

## 2020-07-05 NOTE — ED TRIAGE NOTES
Pt to ED from home with complaints of lower abdominal pain. Pt states she feels like there is a lot of pressure in her vagina. Pt vital signs stable and respirations unlabored. Pt screaming in pain. Pt states pain started yesterday morning and has gotten worse. Pt states she feels like she has to poop or pee. Pt states she is an IV drug user. Pt states she is hot. Pt states pain gets better when she puts pressure on abdomen.
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
## 2020-07-05 NOTE — ED NOTES
RN to room. Pt requesting pain medication at this time. Pt resting in bed in a right sided position. Pt vital signs stable. Pt states \" pain got better with medication but I need more\". Side rails up x2 and call light in reach.       Santos Holloway RN  07/05/20 [id number]

## 2020-07-05 NOTE — ED NOTES
Pt resting in bed in a semi fowlers position at this time. Pt vital signs stable and respirations unlabored. Pt states she is feeling okay at this time. Pt states pain medication has helped.       Michael Andino RN  07/05/20 2497

## 2020-07-05 NOTE — ED NOTES
Pt resting in bed at this time in a left lateral position. Pt appears to be resting comfortably. Pt vital signs stable and respirations unlabored. Ketamine finished at this time.         Ale Preciado RN  07/05/20 1732

## 2020-07-05 NOTE — ED NOTES
RN to room. Pt states pain medication is not working. Pt states \" I know how IV drugs work and this should have kicked in by now\" Pt thrashing around in bed and currently in a fetal position for comfort. Pt vital signs stable. IV fluids running.       Susan Lake RN  07/05/20 9599

## 2020-07-05 NOTE — ED NOTES
Pt medicated at this time. Pt becoming more calm at this time but still complaining of pain. Pt vital signs stable and respirations unlabored.       Danish Chacon RN  07/05/20 4518

## 2020-07-06 LAB
ORGANISM: ABNORMAL
URINE CULTURE REFLEX: ABNORMAL

## 2024-03-16 ENCOUNTER — HOSPITAL ENCOUNTER (EMERGENCY)
Age: 39
Discharge: LEFT BEFORE DISCHARGE | End: 2024-03-16
Attending: EMERGENCY MEDICINE
Payer: MEDICAID

## 2024-03-16 ENCOUNTER — APPOINTMENT (OUTPATIENT)
Dept: ULTRASOUND IMAGING | Age: 39
End: 2024-03-16
Payer: MEDICAID

## 2024-03-16 VITALS
TEMPERATURE: 98.1 F | HEIGHT: 66 IN | OXYGEN SATURATION: 98 % | HEART RATE: 90 BPM | DIASTOLIC BLOOD PRESSURE: 110 MMHG | RESPIRATION RATE: 18 BRPM | WEIGHT: 165 LBS | BODY MASS INDEX: 26.52 KG/M2 | SYSTOLIC BLOOD PRESSURE: 138 MMHG

## 2024-03-16 DIAGNOSIS — Z53.21 ELOPED FROM EMERGENCY DEPARTMENT: Primary | ICD-10-CM

## 2024-03-16 PROCEDURE — 99283 EMERGENCY DEPT VISIT LOW MDM: CPT

## 2024-03-16 ASSESSMENT — ENCOUNTER SYMPTOMS
DIARRHEA: 0
COUGH: 0
SHORTNESS OF BREATH: 0
BACK PAIN: 0
NAUSEA: 0
STRIDOR: 0
WHEEZING: 0

## 2024-03-16 ASSESSMENT — PAIN - FUNCTIONAL ASSESSMENT: PAIN_FUNCTIONAL_ASSESSMENT: NONE - DENIES PAIN

## 2024-03-16 NOTE — ED PROVIDER NOTES
[facility]  EMERGENCY DEPARTMENT ENCOUNTER          Pt Name: Mi Marquis  MRN: [id number]  Birthdate 1985  Date of evaluation: 3/16/2024  Emergency Physician: Nicolás Monreal DO    CHIEF COMPLAINT       Chief Complaint   Patient presents with    Vaginal Bleeding     History obtained from the patient.      HISTORY OF PRESENT ILLNESS    HPI  Mi Marquis is a 38 y.o. female who presents to the emergency department for evaluation of vaginal bleed.  Patient with a history of polysubstance abuse and metromenorrhagia.  She reports that she had just been released from [facility].  She reports that she has been bleeding constantly since 2/29/2024.  She reports she was seen previously and [place] while in incarcerated and started on hormone replacement to help with the bleeding.  She states this did help a short time but bleeding started back shortly after she had stopped the medication.  She reports she is soaking through 5-6 pads per day.  Patient denies abdominal pain fever chills nausea or vomiting.  The patient has no other acute complaints at this time.          REVIEW OF SYSTEMS   Review of Systems   Constitutional:  Negative for chills and fever.   HENT:  Negative for congestion.    Respiratory:  Negative for cough, shortness of breath, wheezing and stridor.    Cardiovascular:  Negative for chest pain.   Gastrointestinal:  Negative for diarrhea and nausea.   Genitourinary:  Positive for menstrual problem and vaginal bleeding. Negative for pelvic pain and urgency.   Musculoskeletal:  Negative for back pain and myalgias.         PAST MEDICAL AND SURGICAL HISTORY     Past Medical History:   Diagnosis Date    Anemia     during pregnancies    Anxiety and depression     no meds currently    Bipolar 1 disorder ([facility])     Constipation     Disc herniation     lumbar    Hepatitis C 12/11/2013    Opioid abuse ([facility])     Pap smear, as part of routine gynecological examination 7-2011     Grandmother          PHYSICAL EXAM     ED Triage Vitals [03/16/24 0931]   BP Temp Temp src Pulse Respirations SpO2 Height Weight - Scale   (!) 138/110 98.1 °F (36.7 °C) -- 90 18 98 % 1.676 m (5' 6\") 74.8 kg (165 lb)         Additional Vital Signs:  Vitals:    03/16/24 0931   BP: (!) 138/110   Pulse: 90   Resp: 18   Temp: 98.1 °F (36.7 °C)   SpO2: 98%       Physical Exam  Constitutional:       General: She is not in acute distress.     Appearance: She is well-developed. She is not diaphoretic.   HENT:      Head: Normocephalic and atraumatic.   Eyes:      General:         Right eye: No discharge.         Left eye: No discharge.      Conjunctiva/sclera: Conjunctivae normal.      Pupils: Pupils are equal, round, and reactive to light.   Neck:      Thyroid: No thyromegaly.      Vascular: No JVD.   Cardiovascular:      Rate and Rhythm: Normal rate and regular rhythm.      Heart sounds: Normal heart sounds.   Pulmonary:      Effort: Pulmonary effort is normal. No respiratory distress.      Breath sounds: Normal breath sounds.   Abdominal:      General: Bowel sounds are normal. There is no distension.      Palpations: Abdomen is soft.      Tenderness: There is no abdominal tenderness.   Musculoskeletal:         General: No tenderness. Normal range of motion.      Cervical back: Normal range of motion and neck supple.   Lymphadenopathy:      Cervical: No cervical adenopathy.   Skin:     General: Skin is warm and dry.      Capillary Refill: Capillary refill takes less than 2 seconds.      Findings: No rash.   Neurological:      Mental Status: She is alert and oriented to person, place, and time. She is not disoriented.      GCS: GCS eye subscore is 4. GCS verbal subscore is 5. GCS motor subscore is 6.      Motor: No abnormal muscle tone or seizure activity.      Gait: Gait normal.      Comments: Awake and alert. Moves all extremities. Non-focal exam with steady gait.            MEDICAL DECISION MAKING   Initial Assessment:

## 2024-03-16 NOTE — ED TRIAGE NOTES
Pt presents to the ED via EMS for vaginal bleeding since 2/29. Pt states that she placed on hormones and that helped. Pt states that since she finished the hormones on Monday her bleeding has been worse. Pt states that she has always had \"hemorrhages\" when she has periods.PT states she has went through 3 pull ups since 0430

## 2024-04-12 ENCOUNTER — SOCIAL WORK (OUTPATIENT)
Dept: INTERNAL MEDICINE CLINIC | Age: 39
End: 2024-04-12

## 2024-04-12 NOTE — PROGRESS NOTES
Patient contacted office to establish services. Reports that she has been clean for 47 days due to being incarcerated. Patient reports that is the longest she has ever been clean. Reports high tolerance, currently taking suboxone off the street, typically 8mg daily. Patient reports she has been and IV ( fentanyl ) though started with pain pills. Reports she has had 10 children and they all use also. Reports having female issues as well. ( See e/d visit 3/16).  Interested in injection. Reports having head aches from suboxone.   Patient has previously been in treatment at [facility] in [facility].    Scheduled with Devorah Nicole CNP 4/15 @10A

## 2024-04-30 ENCOUNTER — OFFICE VISIT (OUTPATIENT)
Dept: INTERNAL MEDICINE CLINIC | Age: 39
End: 2024-04-30
Payer: MEDICAID

## 2024-04-30 VITALS
HEART RATE: 62 BPM | DIASTOLIC BLOOD PRESSURE: 81 MMHG | RESPIRATION RATE: 16 BRPM | WEIGHT: 192 LBS | HEIGHT: 66 IN | SYSTOLIC BLOOD PRESSURE: 111 MMHG | BODY MASS INDEX: 30.86 KG/M2

## 2024-04-30 DIAGNOSIS — F11.20 SEVERE OPIOID USE DISORDER (HCC): Primary | ICD-10-CM

## 2024-04-30 DIAGNOSIS — F40.10 SOCIAL ANXIETY DISORDER: ICD-10-CM

## 2024-04-30 LAB
ALCOHOL URINE: ABNORMAL
AMPHETAMINE SCREEN, URINE: ABNORMAL
BARBITURATE SCREEN, URINE: ABNORMAL
BENZODIAZEPINE SCREEN, URINE: ABNORMAL
BUPRENORPHINE URINE: ABNORMAL
COCAINE METABOLITE SCREEN URINE: ABNORMAL
FENTANYL SCREEN, URINE: ABNORMAL
GABAPENTIN SCREEN, URINE: ABNORMAL
MDMA URINE: ABNORMAL
METHADONE SCREEN, URINE: ABNORMAL
METHAMPHETAMINE, URINE: ABNORMAL
OPIATE SCREEN URINE: ABNORMAL
OXYCODONE SCREEN URINE: ABNORMAL
PHENCYCLIDINE SCREEN URINE: ABNORMAL
PROPOXYPHENE SCREEN, URINE: ABNORMAL
SYNTHETIC CANNABINOIDS(K2) SCREEN, URINE: ABNORMAL
THC SCREEN, URINE: ABNORMAL
TRAMADOL SCREEN URINE: ABNORMAL
TRICYCLIC ANTIDEPRESSANTS, UR: ABNORMAL

## 2024-04-30 PROCEDURE — G8417 CALC BMI ABV UP PARAM F/U: HCPCS | Performed by: NURSE PRACTITIONER

## 2024-04-30 PROCEDURE — 80305 DRUG TEST PRSMV DIR OPT OBS: CPT | Performed by: NURSE PRACTITIONER

## 2024-04-30 PROCEDURE — 4004F PT TOBACCO SCREEN RCVD TLK: CPT | Performed by: NURSE PRACTITIONER

## 2024-04-30 PROCEDURE — G8427 DOCREV CUR MEDS BY ELIG CLIN: HCPCS | Performed by: NURSE PRACTITIONER

## 2024-04-30 PROCEDURE — 99204 OFFICE O/P NEW MOD 45 MIN: CPT | Performed by: NURSE PRACTITIONER

## 2024-04-30 RX ORDER — GABAPENTIN 300 MG/1
300 CAPSULE ORAL 3 TIMES DAILY
Qty: 21 CAPSULE | Refills: 0 | Status: SHIPPED | OUTPATIENT
Start: 2024-04-30 | End: 2024-05-07

## 2024-04-30 RX ORDER — BUPRENORPHINE HYDROCHLORIDE AND NALOXONE HYDROCHLORIDE DIHYDRATE 8; 2 MG/1; MG/1
1 TABLET SUBLINGUAL 2 TIMES DAILY
Qty: 14 TABLET | Refills: 0 | Status: SHIPPED | OUTPATIENT
Start: 2024-04-30 | End: 2024-05-07

## 2024-04-30 RX ORDER — NALOXONE HYDROCHLORIDE 4 MG/.1ML
1 SPRAY NASAL PRN
Qty: 1 EACH | Refills: 1 | Status: SHIPPED | OUTPATIENT
Start: 2024-04-30 | End: 2024-05-01

## 2024-04-30 RX ORDER — OLANZAPINE 5 MG/1
5 TABLET ORAL NIGHTLY
Qty: 7 TABLET | Refills: 0 | Status: SHIPPED | OUTPATIENT
Start: 2024-04-30 | End: 2024-05-07

## 2024-04-30 SDOH — ECONOMIC STABILITY: FOOD INSECURITY: WITHIN THE PAST 12 MONTHS, THE FOOD YOU BOUGHT JUST DIDN'T LAST AND YOU DIDN'T HAVE MONEY TO GET MORE.: NEVER TRUE

## 2024-04-30 SDOH — ECONOMIC STABILITY: HOUSING INSECURITY
IN THE LAST 12 MONTHS, WAS THERE A TIME WHEN YOU DID NOT HAVE A STEADY PLACE TO SLEEP OR SLEPT IN A SHELTER (INCLUDING NOW)?: NO

## 2024-04-30 SDOH — ECONOMIC STABILITY: FOOD INSECURITY: WITHIN THE PAST 12 MONTHS, YOU WORRIED THAT YOUR FOOD WOULD RUN OUT BEFORE YOU GOT MONEY TO BUY MORE.: NEVER TRUE

## 2024-04-30 SDOH — ECONOMIC STABILITY: INCOME INSECURITY: HOW HARD IS IT FOR YOU TO PAY FOR THE VERY BASICS LIKE FOOD, HOUSING, MEDICAL CARE, AND HEATING?: NOT HARD AT ALL

## 2024-04-30 ASSESSMENT — PATIENT HEALTH QUESTIONNAIRE - PHQ9
SUM OF ALL RESPONSES TO PHQ9 QUESTIONS 1 & 2: 0
9. THOUGHTS THAT YOU WOULD BE BETTER OFF DEAD, OR OF HURTING YOURSELF: NOT AT ALL
SUM OF ALL RESPONSES TO PHQ QUESTIONS 1-9: 2
4. FEELING TIRED OR HAVING LITTLE ENERGY: NOT AT ALL
6. FEELING BAD ABOUT YOURSELF - OR THAT YOU ARE A FAILURE OR HAVE LET YOURSELF OR YOUR FAMILY DOWN: NOT AT ALL
5. POOR APPETITE OR OVEREATING: NOT AT ALL
8. MOVING OR SPEAKING SO SLOWLY THAT OTHER PEOPLE COULD HAVE NOTICED. OR THE OPPOSITE, BEING SO FIGETY OR RESTLESS THAT YOU HAVE BEEN MOVING AROUND A LOT MORE THAN USUAL: SEVERAL DAYS
SUM OF ALL RESPONSES TO PHQ QUESTIONS 1-9: 2
3. TROUBLE FALLING OR STAYING ASLEEP: NOT AT ALL
2. FEELING DOWN, DEPRESSED OR HOPELESS: NOT AT ALL
10. IF YOU CHECKED OFF ANY PROBLEMS, HOW DIFFICULT HAVE THESE PROBLEMS MADE IT FOR YOU TO DO YOUR WORK, TAKE CARE OF THINGS AT HOME, OR GET ALONG WITH OTHER PEOPLE: NOT DIFFICULT AT ALL
SUM OF ALL RESPONSES TO PHQ QUESTIONS 1-9: 2
7. TROUBLE CONCENTRATING ON THINGS, SUCH AS READING THE NEWSPAPER OR WATCHING TELEVISION: SEVERAL DAYS
1. LITTLE INTEREST OR PLEASURE IN DOING THINGS: NOT AT ALL
SUM OF ALL RESPONSES TO PHQ QUESTIONS 1-9: 2

## 2024-04-30 ASSESSMENT — ENCOUNTER SYMPTOMS
CHEST TIGHTNESS: 0
ABDOMINAL PAIN: 0
BACK PAIN: 1
WHEEZING: 0
DIARRHEA: 0
COUGH: 0
VOMITING: 0
NAUSEA: 0
SHORTNESS OF BREATH: 0
CONSTIPATION: 0

## 2024-04-30 NOTE — PROGRESS NOTES
ROSE 4/30/2024  1:00 PM Reviewed PDMP [1]         I reviewed the [place] Automated Rx Reporting System report     There does not appear to be any discrepancies or overprescribing of controlled substances      GOAL:  To enhance patient recovery through the use of medication assisted treatment to improve overall quality of life.      OBJECTIVE:  Patient will abstain from the use of mood altering substances 7 out of 7 days per week.      INTERVENTION:  Patient will be maintained on suboxone medication and will be linked to counseling, psychiatry and 12 step meetings as applicable.  Patient will continue current treatment regiment as outlined and treatment plan will be reviewed at each visit.    Discussed importance of attending sober meetings/support. Recommended use of Reseto. Referral to Andressa [facility] for additional resources.     Orders Placed This Encounter   Procedures    POCT Rapid Drug Screen        CARLOS Castellon CNP 04/30/24 1:03 PM

## 2024-05-13 ENCOUNTER — OFFICE VISIT (OUTPATIENT)
Dept: INTERNAL MEDICINE CLINIC | Age: 39
End: 2024-05-13
Payer: MEDICAID

## 2024-05-13 DIAGNOSIS — F19.10 POLYSUBSTANCE ABUSE (HCC): ICD-10-CM

## 2024-05-13 DIAGNOSIS — G89.29 OTHER CHRONIC PAIN: ICD-10-CM

## 2024-05-13 DIAGNOSIS — F11.20 SEVERE OPIOID USE DISORDER (HCC): Primary | ICD-10-CM

## 2024-05-13 PROCEDURE — 4004F PT TOBACCO SCREEN RCVD TLK: CPT | Performed by: NURSE PRACTITIONER

## 2024-05-13 PROCEDURE — G8417 CALC BMI ABV UP PARAM F/U: HCPCS | Performed by: NURSE PRACTITIONER

## 2024-05-13 PROCEDURE — 80305 DRUG TEST PRSMV DIR OPT OBS: CPT | Performed by: NURSE PRACTITIONER

## 2024-05-13 PROCEDURE — 99214 OFFICE O/P EST MOD 30 MIN: CPT | Performed by: NURSE PRACTITIONER

## 2024-05-13 PROCEDURE — G8427 DOCREV CUR MEDS BY ELIG CLIN: HCPCS | Performed by: NURSE PRACTITIONER

## 2024-05-13 RX ORDER — OLANZAPINE 5 MG/1
5 TABLET ORAL NIGHTLY
Qty: 3 TABLET | Refills: 0 | Status: SHIPPED | OUTPATIENT
Start: 2024-05-13 | End: 2024-05-16

## 2024-05-13 RX ORDER — GABAPENTIN 300 MG/1
300 CAPSULE ORAL 3 TIMES DAILY
Qty: 9 CAPSULE | Refills: 0 | Status: SHIPPED | OUTPATIENT
Start: 2024-05-13 | End: 2024-05-16

## 2024-05-13 RX ORDER — BUPRENORPHINE HYDROCHLORIDE AND NALOXONE HYDROCHLORIDE DIHYDRATE 8; 2 MG/1; MG/1
1 TABLET SUBLINGUAL 2 TIMES DAILY
Qty: 6 TABLET | Refills: 0 | Status: SHIPPED | OUTPATIENT
Start: 2024-05-13 | End: 2024-05-16
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
## 2024-05-13 NOTE — PROGRESS NOTES
Verbal order per IDRIS TRAN CNP for urine drug screen. Positive for BUP, BENZO, KRISTAN, THC. Verified results with Li ESQUIVEL LPN.

## 2024-05-14 PROBLEM — F19.10 POLYSUBSTANCE ABUSE (HCC): Status: ACTIVE | Noted: 2024-05-14

## 2024-05-14 PROBLEM — G89.29 OTHER CHRONIC PAIN: Status: ACTIVE | Noted: 2024-05-14

## (undated) DEVICE — SOLUTION IV IRRIG POUR BRL 0.9% SODIUM CHL 2F7124

## (undated) DEVICE — CURITY IDOFORM PACKING STRIP: Brand: CURITY

## (undated) DEVICE — YANKAUER,BULB TIP,W/O VENT,RIGID,STERILE: Brand: MEDLINE

## (undated) DEVICE — GLOVE ORANGE PI 8 1/2   MSG9085

## (undated) DEVICE — APPLICATOR PREP 26ML 0.7% IOD POVACRYLEX 74% ISO ALC ST

## (undated) DEVICE — CONTAINER,SPECIMEN,PNEU TUBE,4OZ,OR STRL: Brand: MEDLINE

## (undated) DEVICE — GAUZE,SPONGE,3"X3",12PLY,STERILE,LF,2'S: Brand: MEDLINE

## (undated) DEVICE — HYPODERMIC SAFETY NEEDLE: Brand: MAGELLAN

## (undated) DEVICE — SYRINGE MED 10ML LUERLOCK TIP W/O SFTY DISP

## (undated) DEVICE — TUBING, SUCTION, 1/4" X 6', STRAIGHT: Brand: MEDLINE

## (undated) DEVICE — SINGLE USE SUCTION VALVE MAJ-209: Brand: SINGLE USE SUCTION VALVE (STERILE)

## (undated) DEVICE — GLOVE SURG SZ 65 THK91MIL LTX FREE SYN POLYISOPRENE

## (undated) DEVICE — GAUZE,SPONGE,8"X4",12PLY,XRAY,STRL,LF: Brand: MEDLINE

## (undated) DEVICE — ROYAL SILK SURGICAL GOWN, XXL: Brand: CONVERTORS

## (undated) DEVICE — ELECTRODE,ECG,STRESS,FOAM,5PK: Brand: MEDLINE

## (undated) DEVICE — 3M™ TRANSPORE™ WHITE SURGICAL TAPE 1534-2, 2 INCH X 10 YARD (5CM X 9,1M), 6 ROLLS/CARTON 10 CARTONS/CASE: Brand: 3M™ TRANSPORE™

## (undated) DEVICE — SKIN AFFIX SURG ADHESIVE 72/CS 0.55ML: Brand: MEDLINE

## (undated) DEVICE — GOWN,SIRUS,NON REINFRCD,LARGE,SET IN SL: Brand: MEDLINE

## (undated) DEVICE — ENDO KIT: Brand: MEDLINE INDUSTRIES, INC.

## (undated) DEVICE — TUBING, SUCTION, 1/4" X 20', STRAIGHT: Brand: MEDLINE INDUSTRIES, INC.

## (undated) DEVICE — SINGLE USE BIOPSY VALVE MAJ-210: Brand: SINGLE USE BIOPSY VALVE (STERILE)

## (undated) DEVICE — SET LNR RED GRN W/ BASE CLEANASCOPE

## (undated) DEVICE — LINER SUCT CANSTR 1500CC SEMI RIG W/ POR HYDROPHOBIC SHUT

## (undated) DEVICE — TRAP,MUCUS SPECIMEN, 80CC: Brand: MEDLINE